# Patient Record
Sex: MALE | Race: WHITE | Employment: OTHER | URBAN - METROPOLITAN AREA
[De-identification: names, ages, dates, MRNs, and addresses within clinical notes are randomized per-mention and may not be internally consistent; named-entity substitution may affect disease eponyms.]

---

## 2018-06-25 PROBLEM — I20.0 UNSTABLE ANGINA (HCC): Status: ACTIVE | Noted: 2018-06-25

## 2018-06-25 PROBLEM — R07.89 ATYPICAL CHEST PAIN: Status: ACTIVE | Noted: 2018-06-25

## 2018-06-25 PROBLEM — E78.5 HLD (HYPERLIPIDEMIA): Status: ACTIVE | Noted: 2018-06-25

## 2018-06-25 PROBLEM — I10 HTN (HYPERTENSION): Status: ACTIVE | Noted: 2018-06-25

## 2018-06-25 PROBLEM — R07.9 CHEST PAIN: Status: ACTIVE | Noted: 2018-06-25

## 2018-12-18 ENCOUNTER — HOSPITAL ENCOUNTER (EMERGENCY)
Age: 58
Discharge: ANOTHER HOSPITAL | End: 2018-12-18
Attending: EMERGENCY MEDICINE
Payer: MEDICARE

## 2018-12-18 VITALS
HEART RATE: 100 BPM | WEIGHT: 265 LBS | BODY MASS INDEX: 34.01 KG/M2 | RESPIRATION RATE: 14 BRPM | DIASTOLIC BLOOD PRESSURE: 108 MMHG | TEMPERATURE: 97.8 F | SYSTOLIC BLOOD PRESSURE: 160 MMHG | HEIGHT: 74 IN | OXYGEN SATURATION: 98 %

## 2018-12-18 DIAGNOSIS — M79.604 RIGHT LEG PAIN: Primary | ICD-10-CM

## 2018-12-18 PROCEDURE — 99284 EMERGENCY DEPT VISIT MOD MDM: CPT

## 2018-12-18 RX ORDER — ACETAMINOPHEN 325 MG/1
650 TABLET ORAL EVERY 6 HOURS PRN
Status: ON HOLD | COMMUNITY
End: 2022-04-08 | Stop reason: HOSPADM

## 2018-12-18 RX ORDER — OXYCODONE HYDROCHLORIDE 5 MG/1
5 TABLET ORAL EVERY 6 HOURS PRN
COMMUNITY
End: 2022-04-02

## 2018-12-18 ASSESSMENT — PAIN DESCRIPTION - LOCATION: LOCATION: LEG

## 2018-12-18 ASSESSMENT — PAIN DESCRIPTION - ORIENTATION: ORIENTATION: RIGHT;LOWER

## 2018-12-18 ASSESSMENT — PAIN SCALES - GENERAL: PAINLEVEL_OUTOF10: 10
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
## 2022-04-02 ENCOUNTER — HOSPITAL ENCOUNTER (INPATIENT)
Age: 62
LOS: 6 days | Discharge: HOME | DRG: 286 | End: 2022-04-08
Attending: EMERGENCY MEDICINE | Admitting: INTERNAL MEDICINE
Payer: MEDICARE

## 2022-04-02 ENCOUNTER — APPOINTMENT (OUTPATIENT)
Dept: CT IMAGING | Age: 62
DRG: 286 | End: 2022-04-02
Payer: MEDICARE

## 2022-04-02 ENCOUNTER — APPOINTMENT (OUTPATIENT)
Dept: GENERAL RADIOLOGY | Age: 62
DRG: 286 | End: 2022-04-02
Payer: MEDICARE

## 2022-04-02 DIAGNOSIS — J96.01 ACUTE RESPIRATORY FAILURE WITH HYPOXIA (HCC): Primary | ICD-10-CM

## 2022-04-02 DIAGNOSIS — R07.9 CHEST PAIN, UNSPECIFIED TYPE: ICD-10-CM

## 2022-04-02 DIAGNOSIS — I10 HYPERTENSION, UNSPECIFIED TYPE: ICD-10-CM

## 2022-04-02 DIAGNOSIS — I50.9 NEW ONSET OF CONGESTIVE HEART FAILURE (HCC): ICD-10-CM

## 2022-04-02 LAB
A/G RATIO: 1.3 (ref 1.1–2.2)
ALBUMIN SERPL-MCNC: 4.1 G/DL (ref 3.4–5)
ALP BLD-CCNC: 99 U/L (ref 40–129)
ALT SERPL-CCNC: 27 U/L (ref 10–40)
ANION GAP SERPL CALCULATED.3IONS-SCNC: 10 MMOL/L (ref 3–16)
AST SERPL-CCNC: 25 U/L (ref 15–37)
BASE EXCESS VENOUS: -1.6 MMOL/L (ref -3–3)
BASOPHILS ABSOLUTE: 0.1 K/UL (ref 0–0.2)
BASOPHILS RELATIVE PERCENT: 0.8 %
BILIRUB SERPL-MCNC: 0.4 MG/DL (ref 0–1)
BILIRUBIN URINE: NEGATIVE
BLOOD, URINE: NEGATIVE
BUN BLDV-MCNC: 27 MG/DL (ref 7–20)
CALCIUM SERPL-MCNC: 9.1 MG/DL (ref 8.3–10.6)
CHLORIDE BLD-SCNC: 105 MMOL/L (ref 99–110)
CLARITY: CLEAR
CO2: 22 MMOL/L (ref 21–32)
COLOR: YELLOW
CREAT SERPL-MCNC: 0.8 MG/DL (ref 0.8–1.3)
D DIMER: 381 NG/ML DDU (ref 0–229)
EOSINOPHILS ABSOLUTE: 0.2 K/UL (ref 0–0.6)
EOSINOPHILS RELATIVE PERCENT: 2.1 %
GFR AFRICAN AMERICAN: >60
GFR NON-AFRICAN AMERICAN: >60
GLUCOSE BLD-MCNC: 144 MG/DL (ref 70–99)
GLUCOSE URINE: NEGATIVE MG/DL
HCO3 VENOUS: 23.1 MMOL/L (ref 23–29)
HCT VFR BLD CALC: 38 % (ref 40.5–52.5)
HEMOGLOBIN: 12.6 G/DL (ref 13.5–17.5)
HYALINE CASTS: NORMAL /LPF (ref 0–2)
KETONES, URINE: NEGATIVE MG/DL
LEUKOCYTE ESTERASE, URINE: NEGATIVE
LYMPHOCYTES ABSOLUTE: 1.3 K/UL (ref 1–5.1)
LYMPHOCYTES RELATIVE PERCENT: 12.4 %
MCH RBC QN AUTO: 28.8 PG (ref 26–34)
MCHC RBC AUTO-ENTMCNC: 33.2 G/DL (ref 31–36)
MCV RBC AUTO: 86.7 FL (ref 80–100)
MICROSCOPIC EXAMINATION: NORMAL
MONOCYTES ABSOLUTE: 0.8 K/UL (ref 0–1.3)
MONOCYTES RELATIVE PERCENT: 8.1 %
NEUTROPHILS ABSOLUTE: 8 K/UL (ref 1.7–7.7)
NEUTROPHILS RELATIVE PERCENT: 76.6 %
NITRITE, URINE: NEGATIVE
O2 SAT, VEN: 92 %
O2 THERAPY: ABNORMAL
PCO2, VEN: 37.2 MMHG (ref 40–50)
PDW BLD-RTO: 14.5 % (ref 12.4–15.4)
PH UA: 6 (ref 5–8)
PH VENOUS: 7.4 (ref 7.35–7.45)
PLATELET # BLD: 244 K/UL (ref 135–450)
PMV BLD AUTO: 8.8 FL (ref 5–10.5)
PO2, VEN: 63.1 MMHG (ref 25–40)
POTASSIUM REFLEX MAGNESIUM: 4.2 MMOL/L (ref 3.5–5.1)
PRO-BNP: 184 PG/ML (ref 0–124)
PROTEIN UA: NEGATIVE MG/DL
RBC # BLD: 4.38 M/UL (ref 4.2–5.9)
RBC UA: NORMAL /HPF (ref 0–4)
SODIUM BLD-SCNC: 137 MMOL/L (ref 136–145)
SPECIFIC GRAVITY UA: 1.01 (ref 1–1.03)
TCO2 CALC VENOUS: 24 MMOL/L
TOTAL CK: 157 U/L (ref 39–308)
TOTAL PROTEIN: 7.2 G/DL (ref 6.4–8.2)
TROPONIN: <0.01 NG/ML
TSH REFLEX: 0.7 UIU/ML (ref 0.27–4.2)
URINE TYPE: NORMAL
UROBILINOGEN, URINE: 0.2 E.U./DL
WBC # BLD: 10.4 K/UL (ref 4–11)
WBC UA: NORMAL /HPF (ref 0–5)

## 2022-04-02 PROCEDURE — 6370000000 HC RX 637 (ALT 250 FOR IP): Performed by: INTERNAL MEDICINE

## 2022-04-02 PROCEDURE — 36415 COLL VENOUS BLD VENIPUNCTURE: CPT

## 2022-04-02 PROCEDURE — 85025 COMPLETE CBC W/AUTO DIFF WBC: CPT

## 2022-04-02 PROCEDURE — 6370000000 HC RX 637 (ALT 250 FOR IP): Performed by: EMERGENCY MEDICINE

## 2022-04-02 PROCEDURE — 82803 BLOOD GASES ANY COMBINATION: CPT

## 2022-04-02 PROCEDURE — 2580000003 HC RX 258: Performed by: INTERNAL MEDICINE

## 2022-04-02 PROCEDURE — 85379 FIBRIN DEGRADATION QUANT: CPT

## 2022-04-02 PROCEDURE — 6360000004 HC RX CONTRAST MEDICATION: Performed by: INTERNAL MEDICINE

## 2022-04-02 PROCEDURE — 80053 COMPREHEN METABOLIC PANEL: CPT

## 2022-04-02 PROCEDURE — 1200000000 HC SEMI PRIVATE

## 2022-04-02 PROCEDURE — 84443 ASSAY THYROID STIM HORMONE: CPT

## 2022-04-02 PROCEDURE — 93005 ELECTROCARDIOGRAM TRACING: CPT | Performed by: EMERGENCY MEDICINE

## 2022-04-02 PROCEDURE — 83880 ASSAY OF NATRIURETIC PEPTIDE: CPT

## 2022-04-02 PROCEDURE — 81001 URINALYSIS AUTO W/SCOPE: CPT

## 2022-04-02 PROCEDURE — 71275 CT ANGIOGRAPHY CHEST: CPT

## 2022-04-02 PROCEDURE — 82550 ASSAY OF CK (CPK): CPT

## 2022-04-02 PROCEDURE — 71046 X-RAY EXAM CHEST 2 VIEWS: CPT

## 2022-04-02 PROCEDURE — 84484 ASSAY OF TROPONIN QUANT: CPT

## 2022-04-02 PROCEDURE — 83036 HEMOGLOBIN GLYCOSYLATED A1C: CPT

## 2022-04-02 PROCEDURE — 99284 EMERGENCY DEPT VISIT MOD MDM: CPT

## 2022-04-02 PROCEDURE — 6360000002 HC RX W HCPCS: Performed by: EMERGENCY MEDICINE

## 2022-04-02 PROCEDURE — 6360000002 HC RX W HCPCS: Performed by: INTERNAL MEDICINE

## 2022-04-02 RX ORDER — ACETAMINOPHEN 650 MG/1
650 SUPPOSITORY RECTAL EVERY 6 HOURS PRN
Status: DISCONTINUED | OUTPATIENT
Start: 2022-04-02 | End: 2022-04-08 | Stop reason: HOSPADM

## 2022-04-02 RX ORDER — ONDANSETRON 2 MG/ML
4 INJECTION INTRAMUSCULAR; INTRAVENOUS EVERY 6 HOURS PRN
Status: DISCONTINUED | OUTPATIENT
Start: 2022-04-02 | End: 2022-04-08 | Stop reason: HOSPADM

## 2022-04-02 RX ORDER — ONDANSETRON 4 MG/1
4 TABLET, ORALLY DISINTEGRATING ORAL EVERY 8 HOURS PRN
Status: DISCONTINUED | OUTPATIENT
Start: 2022-04-02 | End: 2022-04-08 | Stop reason: HOSPADM

## 2022-04-02 RX ORDER — SODIUM CHLORIDE 9 MG/ML
INJECTION, SOLUTION INTRAVENOUS PRN
Status: DISCONTINUED | OUTPATIENT
Start: 2022-04-02 | End: 2022-04-08 | Stop reason: HOSPADM

## 2022-04-02 RX ORDER — SODIUM CHLORIDE 0.9 % (FLUSH) 0.9 %
5-40 SYRINGE (ML) INJECTION PRN
Status: DISCONTINUED | OUTPATIENT
Start: 2022-04-02 | End: 2022-04-08 | Stop reason: HOSPADM

## 2022-04-02 RX ORDER — FUROSEMIDE 10 MG/ML
40 INJECTION INTRAMUSCULAR; INTRAVENOUS 2 TIMES DAILY
Status: DISCONTINUED | OUTPATIENT
Start: 2022-04-02 | End: 2022-04-02

## 2022-04-02 RX ORDER — FUROSEMIDE 10 MG/ML
20 INJECTION INTRAMUSCULAR; INTRAVENOUS ONCE
Status: COMPLETED | OUTPATIENT
Start: 2022-04-02 | End: 2022-04-02

## 2022-04-02 RX ORDER — FUROSEMIDE 10 MG/ML
40 INJECTION INTRAMUSCULAR; INTRAVENOUS 2 TIMES DAILY
Status: DISCONTINUED | OUTPATIENT
Start: 2022-04-03 | End: 2022-04-03

## 2022-04-02 RX ORDER — HYDRALAZINE HYDROCHLORIDE 25 MG/1
25 TABLET, FILM COATED ORAL ONCE
Status: COMPLETED | OUTPATIENT
Start: 2022-04-02 | End: 2022-04-02

## 2022-04-02 RX ORDER — ACETAMINOPHEN 325 MG/1
650 TABLET ORAL EVERY 6 HOURS PRN
Status: DISCONTINUED | OUTPATIENT
Start: 2022-04-02 | End: 2022-04-08 | Stop reason: HOSPADM

## 2022-04-02 RX ORDER — SODIUM CHLORIDE 0.9 % (FLUSH) 0.9 %
5-40 SYRINGE (ML) INJECTION EVERY 12 HOURS SCHEDULED
Status: DISCONTINUED | OUTPATIENT
Start: 2022-04-02 | End: 2022-04-08 | Stop reason: HOSPADM

## 2022-04-02 RX ORDER — ATORVASTATIN CALCIUM 40 MG/1
40 TABLET, FILM COATED ORAL NIGHTLY
Status: DISCONTINUED | OUTPATIENT
Start: 2022-04-02 | End: 2022-04-08 | Stop reason: HOSPADM

## 2022-04-02 RX ORDER — POLYETHYLENE GLYCOL 3350 17 G/17G
17 POWDER, FOR SOLUTION ORAL DAILY PRN
Status: DISCONTINUED | OUTPATIENT
Start: 2022-04-02 | End: 2022-04-08 | Stop reason: HOSPADM

## 2022-04-02 RX ORDER — ASPIRIN 81 MG/1
324 TABLET, CHEWABLE ORAL ONCE
Status: COMPLETED | OUTPATIENT
Start: 2022-04-02 | End: 2022-04-02

## 2022-04-02 RX ORDER — PANTOPRAZOLE SODIUM 40 MG/1
40 TABLET, DELAYED RELEASE ORAL
COMMUNITY
Start: 2021-08-13

## 2022-04-02 RX ORDER — GABAPENTIN 300 MG/1
300 CAPSULE ORAL 3 TIMES DAILY
Status: ON HOLD | COMMUNITY
Start: 2021-12-06 | End: 2022-04-08 | Stop reason: SDUPTHER

## 2022-04-02 RX ORDER — ASPIRIN 81 MG/1
81 TABLET, CHEWABLE ORAL DAILY
Status: DISCONTINUED | OUTPATIENT
Start: 2022-04-03 | End: 2022-04-08 | Stop reason: HOSPADM

## 2022-04-02 RX ADMIN — SODIUM CHLORIDE, PRESERVATIVE FREE 10 ML: 5 INJECTION INTRAVENOUS at 20:22

## 2022-04-02 RX ADMIN — HYDRALAZINE HYDROCHLORIDE 25 MG: 25 TABLET, FILM COATED ORAL at 07:27

## 2022-04-02 RX ADMIN — NITROGLYCERIN 0.5 INCH: 20 OINTMENT TOPICAL at 06:49

## 2022-04-02 RX ADMIN — ASPIRIN 81 MG 324 MG: 81 TABLET ORAL at 05:38

## 2022-04-02 RX ADMIN — FUROSEMIDE 20 MG: 10 INJECTION, SOLUTION INTRAMUSCULAR; INTRAVENOUS at 06:49

## 2022-04-02 RX ADMIN — ATORVASTATIN CALCIUM 40 MG: 40 TABLET, FILM COATED ORAL at 20:22

## 2022-04-02 RX ADMIN — ENOXAPARIN SODIUM 40 MG: 40 INJECTION SUBCUTANEOUS at 13:17

## 2022-04-02 RX ADMIN — IOPAMIDOL 80 ML: 755 INJECTION, SOLUTION INTRAVENOUS at 19:20

## 2022-04-02 ASSESSMENT — PAIN SCALES - GENERAL
PAINLEVEL_OUTOF10: 0
PAINLEVEL_OUTOF10: 5
PAINLEVEL_OUTOF10: 0

## 2022-04-02 ASSESSMENT — ENCOUNTER SYMPTOMS
NAUSEA: 0
BACK PAIN: 1
COUGH: 0
ABDOMINAL PAIN: 0
COLOR CHANGE: 0
VOMITING: 0
SHORTNESS OF BREATH: 1

## 2022-04-02 ASSESSMENT — PAIN DESCRIPTION - LOCATION: LOCATION: CHEST
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
## 2022-04-02 NOTE — PROGRESS NOTES
4 Eyes Admission Assessment     I agree as the admission nurse that 2 RN's have performed a thorough Head to Toe Skin Assessment on the patient. ALL assessment sites listed below have been assessed on admission. Areas assessed by both nurses:   [x]   Head, Face, and Ears   [x]   Shoulders, Back, and Chest  [x]   Arms, Elbows, and Hands   [x]   Coccyx, Sacrum, and Ischum  [x]   Legs, Feet, and Heels        Does the Patient have Skin Breakdown?   No         Phi Prevention initiated:  No   Wound Care Orders initiated:  No      WOC nurse consulted for Pressure Injury (Stage 3,4, Unstageable, DTI, NWPT, and Complex wounds):  NA      Nurse 1 eSignature: Electronically signed by Narayan Seth RN on 4/2/22 at 3:18 PM EDT    **SHARE this note so that the co-signing nurse is able to place an eSignature**    Nurse 2 eSignature: Electronically signed by Isabelle Moreno RN on 4/2/22 at 4:46 PM EDT    \
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
## 2022-04-03 LAB
ANION GAP SERPL CALCULATED.3IONS-SCNC: 10 MMOL/L (ref 3–16)
ANTI-XA UNFRAC HEPARIN: 0.63 IU/ML (ref 0.3–0.7)
APTT: 33.7 SEC (ref 26.2–38.6)
BASOPHILS ABSOLUTE: 0 K/UL (ref 0–0.2)
BASOPHILS RELATIVE PERCENT: 0.4 %
BUN BLDV-MCNC: 17 MG/DL (ref 7–20)
CALCIUM SERPL-MCNC: 9.4 MG/DL (ref 8.3–10.6)
CHLORIDE BLD-SCNC: 101 MMOL/L (ref 99–110)
CO2: 27 MMOL/L (ref 21–32)
CREAT SERPL-MCNC: 0.9 MG/DL (ref 0.8–1.3)
EOSINOPHILS ABSOLUTE: 0.3 K/UL (ref 0–0.6)
EOSINOPHILS RELATIVE PERCENT: 3.7 %
ESTIMATED AVERAGE GLUCOSE: 128.4 MG/DL
GFR AFRICAN AMERICAN: >60
GFR NON-AFRICAN AMERICAN: >60
GLUCOSE BLD-MCNC: 113 MG/DL (ref 70–99)
HBA1C MFR BLD: 6.1 %
HCT VFR BLD CALC: 39.1 % (ref 40.5–52.5)
HEMOGLOBIN: 12.9 G/DL (ref 13.5–17.5)
INR BLD: 1 (ref 0.88–1.12)
LYMPHOCYTES ABSOLUTE: 1.5 K/UL (ref 1–5.1)
LYMPHOCYTES RELATIVE PERCENT: 19.7 %
MAGNESIUM: 2.3 MG/DL (ref 1.8–2.4)
MCH RBC QN AUTO: 28.9 PG (ref 26–34)
MCHC RBC AUTO-ENTMCNC: 32.9 G/DL (ref 31–36)
MCV RBC AUTO: 87.7 FL (ref 80–100)
MONOCYTES ABSOLUTE: 1 K/UL (ref 0–1.3)
MONOCYTES RELATIVE PERCENT: 13 %
NEUTROPHILS ABSOLUTE: 4.9 K/UL (ref 1.7–7.7)
NEUTROPHILS RELATIVE PERCENT: 63.2 %
PDW BLD-RTO: 14.8 % (ref 12.4–15.4)
PLATELET # BLD: 255 K/UL (ref 135–450)
PMV BLD AUTO: 8.9 FL (ref 5–10.5)
POTASSIUM SERPL-SCNC: 4.1 MMOL/L (ref 3.5–5.1)
PROTHROMBIN TIME: 11.3 SEC (ref 9.9–12.7)
RBC # BLD: 4.46 M/UL (ref 4.2–5.9)
SODIUM BLD-SCNC: 138 MMOL/L (ref 136–145)
WBC # BLD: 7.7 K/UL (ref 4–11)

## 2022-04-03 PROCEDURE — 2580000003 HC RX 258: Performed by: INTERNAL MEDICINE

## 2022-04-03 PROCEDURE — 1200000000 HC SEMI PRIVATE

## 2022-04-03 PROCEDURE — 6360000002 HC RX W HCPCS: Performed by: INTERNAL MEDICINE

## 2022-04-03 PROCEDURE — 36415 COLL VENOUS BLD VENIPUNCTURE: CPT

## 2022-04-03 PROCEDURE — 93005 ELECTROCARDIOGRAM TRACING: CPT | Performed by: INTERNAL MEDICINE

## 2022-04-03 PROCEDURE — 2500000003 HC RX 250 WO HCPCS: Performed by: INTERNAL MEDICINE

## 2022-04-03 PROCEDURE — 6370000000 HC RX 637 (ALT 250 FOR IP): Performed by: INTERNAL MEDICINE

## 2022-04-03 PROCEDURE — 85610 PROTHROMBIN TIME: CPT

## 2022-04-03 PROCEDURE — 83735 ASSAY OF MAGNESIUM: CPT

## 2022-04-03 PROCEDURE — 85730 THROMBOPLASTIN TIME PARTIAL: CPT

## 2022-04-03 PROCEDURE — 99222 1ST HOSP IP/OBS MODERATE 55: CPT | Performed by: INTERNAL MEDICINE

## 2022-04-03 PROCEDURE — 85025 COMPLETE CBC W/AUTO DIFF WBC: CPT

## 2022-04-03 PROCEDURE — 80061 LIPID PANEL: CPT

## 2022-04-03 PROCEDURE — 85520 HEPARIN ASSAY: CPT

## 2022-04-03 PROCEDURE — 80048 BASIC METABOLIC PNL TOTAL CA: CPT

## 2022-04-03 RX ORDER — HEPARIN SODIUM 1000 [USP'U]/ML
10000 INJECTION, SOLUTION INTRAVENOUS; SUBCUTANEOUS PRN
Status: DISCONTINUED | OUTPATIENT
Start: 2022-04-03 | End: 2022-04-08 | Stop reason: HOSPADM

## 2022-04-03 RX ORDER — HEPARIN SODIUM 1000 [USP'U]/ML
5000 INJECTION, SOLUTION INTRAVENOUS; SUBCUTANEOUS PRN
Status: DISCONTINUED | OUTPATIENT
Start: 2022-04-03 | End: 2022-04-08 | Stop reason: HOSPADM

## 2022-04-03 RX ORDER — METOPROLOL TARTRATE 5 MG/5ML
5 INJECTION INTRAVENOUS EVERY 5 MIN PRN
Status: COMPLETED | OUTPATIENT
Start: 2022-04-03 | End: 2022-04-05

## 2022-04-03 RX ORDER — METOPROLOL TARTRATE 5 MG/5ML
5 INJECTION INTRAVENOUS ONCE
Status: COMPLETED | OUTPATIENT
Start: 2022-04-03 | End: 2022-04-03

## 2022-04-03 RX ORDER — PANTOPRAZOLE SODIUM 40 MG/1
40 TABLET, DELAYED RELEASE ORAL
Status: DISCONTINUED | OUTPATIENT
Start: 2022-04-03 | End: 2022-04-08 | Stop reason: HOSPADM

## 2022-04-03 RX ORDER — HEPARIN SODIUM 10000 [USP'U]/100ML
5-30 INJECTION, SOLUTION INTRAVENOUS CONTINUOUS
Status: DISCONTINUED | OUTPATIENT
Start: 2022-04-03 | End: 2022-04-07

## 2022-04-03 RX ORDER — METOPROLOL TARTRATE 5 MG/5ML
5 INJECTION INTRAVENOUS
Status: COMPLETED | OUTPATIENT
Start: 2022-04-03 | End: 2022-04-03

## 2022-04-03 RX ORDER — HEPARIN SODIUM 1000 [USP'U]/ML
10000 INJECTION, SOLUTION INTRAVENOUS; SUBCUTANEOUS ONCE
Status: COMPLETED | OUTPATIENT
Start: 2022-04-03 | End: 2022-04-03

## 2022-04-03 RX ADMIN — HEPARIN SODIUM 14 UNITS/KG/HR: 5000 INJECTION INTRAVENOUS; SUBCUTANEOUS at 15:08

## 2022-04-03 RX ADMIN — METOPROLOL TARTRATE 5 MG: 5 INJECTION INTRAVENOUS at 09:20

## 2022-04-03 RX ADMIN — ATORVASTATIN CALCIUM 40 MG: 40 TABLET, FILM COATED ORAL at 20:32

## 2022-04-03 RX ADMIN — ENOXAPARIN SODIUM 40 MG: 40 INJECTION SUBCUTANEOUS at 08:37

## 2022-04-03 RX ADMIN — FUROSEMIDE 40 MG: 10 INJECTION, SOLUTION INTRAMUSCULAR; INTRAVENOUS at 08:37

## 2022-04-03 RX ADMIN — PANTOPRAZOLE SODIUM 40 MG: 40 TABLET, DELAYED RELEASE ORAL at 15:03

## 2022-04-03 RX ADMIN — METOPROLOL TARTRATE 25 MG: 25 TABLET, FILM COATED ORAL at 20:32

## 2022-04-03 RX ADMIN — ASPIRIN 81 MG 81 MG: 81 TABLET ORAL at 08:37

## 2022-04-03 RX ADMIN — METOPROLOL TARTRATE 5 MG: 5 INJECTION INTRAVENOUS at 06:06

## 2022-04-03 RX ADMIN — HEPARIN SODIUM 10000 UNITS: 1000 INJECTION INTRAVENOUS; SUBCUTANEOUS at 15:08

## 2022-04-03 RX ADMIN — METOPROLOL TARTRATE 25 MG: 25 TABLET, FILM COATED ORAL at 15:03

## 2022-04-03 RX ADMIN — SODIUM CHLORIDE, PRESERVATIVE FREE 10 ML: 5 INJECTION INTRAVENOUS at 08:37

## 2022-04-03 RX ADMIN — SODIUM CHLORIDE, PRESERVATIVE FREE 10 ML: 5 INJECTION INTRAVENOUS at 20:32

## 2022-04-03 RX ADMIN — METOPROLOL TARTRATE 5 MG: 5 INJECTION INTRAVENOUS at 01:48

## 2022-04-03 NOTE — PLAN OF CARE
Problem: Safety:  Goal: Free from accidental physical injury  Description: Free from accidental physical injury  4/3/2022 1542 by Yaquelin Jones RN  Outcome: Ongoing   Pt maintains steady gait with ambulation. Pt is A&O x4 and uses call light when in need of assistance. Will monitor. Problem: Daily Care:  Goal: Daily care needs are met  Description: Daily care needs are met  Outcome: Ongoing     Problem: Pain:  Goal: Patient's pain/discomfort is manageable  Description: Patient's pain/discomfort is manageable  4/3/2022 1542 by Yaquelin Jones RN  Outcome: Ongoing   Pt has not c/o pain this shift. Will monitor. Problem: Skin Integrity:  Goal: Skin integrity will stabilize  Description: Skin integrity will stabilize  Outcome: Ongoing   Pt does not have any s/s of skin breakdown.     Problem: OXYGENATION/RESPIRATORY FUNCTION  Goal: Patient will maintain patent airway  4/3/2022 1542 by Yaquelin Jones RN  Outcome: Ongoing
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
## 2022-04-04 LAB
ANION GAP SERPL CALCULATED.3IONS-SCNC: 9 MMOL/L (ref 3–16)
ANTI-XA UNFRAC HEPARIN: 0.47 IU/ML (ref 0.3–0.7)
BASOPHILS ABSOLUTE: 0.1 K/UL (ref 0–0.2)
BASOPHILS RELATIVE PERCENT: 1 %
BUN BLDV-MCNC: 23 MG/DL (ref 7–20)
CALCIUM SERPL-MCNC: 9.8 MG/DL (ref 8.3–10.6)
CHLORIDE BLD-SCNC: 100 MMOL/L (ref 99–110)
CHOLESTEROL, TOTAL: 129 MG/DL (ref 0–199)
CO2: 25 MMOL/L (ref 21–32)
CREAT SERPL-MCNC: 1 MG/DL (ref 0.8–1.3)
EKG ATRIAL RATE: 136 BPM
EKG ATRIAL RATE: 85 BPM
EKG DIAGNOSIS: NORMAL
EKG DIAGNOSIS: NORMAL
EKG P AXIS: 63 DEGREES
EKG P-R INTERVAL: 166 MS
EKG Q-T INTERVAL: 334 MS
EKG Q-T INTERVAL: 400 MS
EKG QRS DURATION: 84 MS
EKG QRS DURATION: 98 MS
EKG QTC CALCULATION (BAZETT): 476 MS
EKG QTC CALCULATION (BAZETT): 483 MS
EKG R AXIS: 33 DEGREES
EKG R AXIS: 56 DEGREES
EKG T AXIS: 37 DEGREES
EKG T AXIS: 75 DEGREES
EKG VENTRICULAR RATE: 126 BPM
EKG VENTRICULAR RATE: 85 BPM
EOSINOPHILS ABSOLUTE: 0.4 K/UL (ref 0–0.6)
EOSINOPHILS RELATIVE PERCENT: 3.8 %
GFR AFRICAN AMERICAN: >60
GFR NON-AFRICAN AMERICAN: >60
GLUCOSE BLD-MCNC: 132 MG/DL (ref 70–99)
HCT VFR BLD CALC: 41.5 % (ref 40.5–52.5)
HDLC SERPL-MCNC: 49 MG/DL (ref 40–60)
HEMOGLOBIN: 13.8 G/DL (ref 13.5–17.5)
LDL CHOLESTEROL CALCULATED: 65 MG/DL
LYMPHOCYTES ABSOLUTE: 2.9 K/UL (ref 1–5.1)
LYMPHOCYTES RELATIVE PERCENT: 29.5 %
MAGNESIUM: 2.3 MG/DL (ref 1.8–2.4)
MCH RBC QN AUTO: 29.3 PG (ref 26–34)
MCHC RBC AUTO-ENTMCNC: 33.4 G/DL (ref 31–36)
MCV RBC AUTO: 87.8 FL (ref 80–100)
MONOCYTES ABSOLUTE: 1.1 K/UL (ref 0–1.3)
MONOCYTES RELATIVE PERCENT: 10.7 %
NEUTROPHILS ABSOLUTE: 5.4 K/UL (ref 1.7–7.7)
NEUTROPHILS RELATIVE PERCENT: 55 %
PDW BLD-RTO: 14.8 % (ref 12.4–15.4)
PLATELET # BLD: 265 K/UL (ref 135–450)
PMV BLD AUTO: 8.9 FL (ref 5–10.5)
POTASSIUM SERPL-SCNC: 3.6 MMOL/L (ref 3.5–5.1)
RBC # BLD: 4.72 M/UL (ref 4.2–5.9)
SARS-COV-2, NAAT: NOT DETECTED
SODIUM BLD-SCNC: 134 MMOL/L (ref 136–145)
TRIGL SERPL-MCNC: 75 MG/DL (ref 0–150)
VLDLC SERPL CALC-MCNC: 15 MG/DL
WBC # BLD: 9.9 K/UL (ref 4–11)

## 2022-04-04 PROCEDURE — 6370000000 HC RX 637 (ALT 250 FOR IP): Performed by: INTERNAL MEDICINE

## 2022-04-04 PROCEDURE — 80048 BASIC METABOLIC PNL TOTAL CA: CPT

## 2022-04-04 PROCEDURE — 6360000002 HC RX W HCPCS: Performed by: INTERNAL MEDICINE

## 2022-04-04 PROCEDURE — 36415 COLL VENOUS BLD VENIPUNCTURE: CPT

## 2022-04-04 PROCEDURE — 2060000000 HC ICU INTERMEDIATE R&B

## 2022-04-04 PROCEDURE — 85520 HEPARIN ASSAY: CPT

## 2022-04-04 PROCEDURE — 87635 SARS-COV-2 COVID-19 AMP PRB: CPT

## 2022-04-04 PROCEDURE — 93010 ELECTROCARDIOGRAM REPORT: CPT | Performed by: INTERNAL MEDICINE

## 2022-04-04 PROCEDURE — 83735 ASSAY OF MAGNESIUM: CPT

## 2022-04-04 PROCEDURE — 2580000003 HC RX 258: Performed by: INTERNAL MEDICINE

## 2022-04-04 PROCEDURE — 85025 COMPLETE CBC W/AUTO DIFF WBC: CPT

## 2022-04-04 RX ADMIN — HEPARIN SODIUM 14 UNITS/KG/HR: 5000 INJECTION INTRAVENOUS; SUBCUTANEOUS at 05:10

## 2022-04-04 RX ADMIN — SODIUM CHLORIDE, PRESERVATIVE FREE 10 ML: 5 INJECTION INTRAVENOUS at 09:07

## 2022-04-04 RX ADMIN — PANTOPRAZOLE SODIUM 40 MG: 40 TABLET, DELAYED RELEASE ORAL at 05:11

## 2022-04-04 RX ADMIN — METOPROLOL TARTRATE 25 MG: 25 TABLET, FILM COATED ORAL at 09:07

## 2022-04-04 RX ADMIN — SODIUM CHLORIDE, PRESERVATIVE FREE 10 ML: 5 INJECTION INTRAVENOUS at 21:25

## 2022-04-04 RX ADMIN — METOPROLOL TARTRATE 25 MG: 25 TABLET, FILM COATED ORAL at 21:24

## 2022-04-04 RX ADMIN — HEPARIN SODIUM 14 UNITS/KG/HR: 5000 INJECTION INTRAVENOUS; SUBCUTANEOUS at 18:35

## 2022-04-04 RX ADMIN — ATORVASTATIN CALCIUM 40 MG: 40 TABLET, FILM COATED ORAL at 21:24

## 2022-04-04 RX ADMIN — ASPIRIN 81 MG 81 MG: 81 TABLET ORAL at 09:07

## 2022-04-04 ASSESSMENT — PAIN SCALES - GENERAL
PAINLEVEL_OUTOF10: 0

## 2022-04-04 NOTE — CONSULTS
Nutrition Education    RD consulted for CHF diet edu. Pt's wife and daughter in room during encounter participating in discussion. RD discussed importance of eating a low sodium diet for reducing CHF complications. RD educated pt and family regarding how to identify a low sodium food product via nutrition label, sodium dietary guidelines for CHF and ways to reduce sodium intake. Pt and family emphasized their readiness to make dietary changes to improve his health. · Verbally reviewed information with Patient and Family  · Educated on Heart Failure Nutrition Therapy  · Written educational materials provided. · Contact name and number provided. · Refer to Patient Education activity for more details.     Electronically signed by Surya Delgado MS, RD, LD on 4/4/22 at 10:21 AM EDT    Contact: [phone]

## 2022-04-04 NOTE — PLAN OF CARE
Problem: Safety:  Goal: Free from accidental physical injury  Description: Free from accidental physical injury  4/4/2022 0139 by Antonio Olivares, RN  Outcome: Ongoing  Pt calls appropriately for needs, call light and bedside table within reach. Ambulates with steady gait, will continue to monitor. Problem: Pain:  Goal: Patient's pain/discomfort is manageable  Description: Patient's pain/discomfort is manageable  4/4/2022 0139 by Antonio Olivares, RN  Outcome: Ongoing  Pt denies c/o pain. Problem: HEMODYNAMIC STATUS  Goal: Patient has stable vital signs and fluid balance  4/4/2022 0139 by Antonio Olivares, RN  Outcome: Ongoing  Pt is a-fib/a-flutter on telemetry. HR in low 100's. Heparin gtt infusing per cardiology, planning for ECHO in am. Will continue to monitor.
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
## 2022-04-04 NOTE — PLAN OF CARE
Problem: Safety:  Goal: Free from accidental physical injury  Description: Free from accidental physical injury  4/4/2022 0927 by Crystal Jimenez RN  Outcome: Ongoing  Note: Pt is up ad gerardo to the bathroom. Able to call nursing staff if assistance is needed. Non-skid socks worn while out of bed. Wife at bedside. Problem: Pain:  Goal: Patient's pain/discomfort is manageable  Description: Patient's pain/discomfort is manageable  4/4/2022 0927 by Crystal Jimenez RN  Outcome: Ongoing  Note: Patient has denied chest pain and shortness of breath so far this shift. Pt is able to use call light to express needs or concerns. Family at bedside and hourly rounding in place. Problem: OXYGENATION/RESPIRATORY FUNCTION  Goal: Patient will maintain patent airway  Outcome: Ongoing  Note: Patient remains on room air. Lungs are clear      Problem: HEMODYNAMIC STATUS  Goal: Patient has stable vital signs and fluid balance  4/4/2022 0927 by Crystal Jimenez RN  Outcome: Ongoing  Note: Patient is on strict I&O's continues to void via urinal.VS as charted in the [facility]

## 2022-04-05 LAB
ANION GAP SERPL CALCULATED.3IONS-SCNC: 12 MMOL/L (ref 3–16)
ANTI-XA UNFRAC HEPARIN: 0.46 IU/ML (ref 0.3–0.7)
BASOPHILS ABSOLUTE: 0 K/UL (ref 0–0.2)
BASOPHILS RELATIVE PERCENT: 0.4 %
BUN BLDV-MCNC: 21 MG/DL (ref 7–20)
CALCIUM SERPL-MCNC: 9.4 MG/DL (ref 8.3–10.6)
CHLORIDE BLD-SCNC: 103 MMOL/L (ref 99–110)
CO2: 23 MMOL/L (ref 21–32)
CREAT SERPL-MCNC: 1.1 MG/DL (ref 0.8–1.3)
EOSINOPHILS ABSOLUTE: 0.3 K/UL (ref 0–0.6)
EOSINOPHILS RELATIVE PERCENT: 3.4 %
GFR AFRICAN AMERICAN: >60
GFR NON-AFRICAN AMERICAN: >60
GLUCOSE BLD-MCNC: 113 MG/DL (ref 70–99)
HCT VFR BLD CALC: 38.7 % (ref 40.5–52.5)
HEMOGLOBIN: 12.9 G/DL (ref 13.5–17.5)
LV EF: 43 %
LVEF MODALITY: NORMAL
LYMPHOCYTES ABSOLUTE: 2.7 K/UL (ref 1–5.1)
LYMPHOCYTES RELATIVE PERCENT: 26.8 %
MAGNESIUM: 2.1 MG/DL (ref 1.8–2.4)
MCH RBC QN AUTO: 29.1 PG (ref 26–34)
MCHC RBC AUTO-ENTMCNC: 33.3 G/DL (ref 31–36)
MCV RBC AUTO: 87.3 FL (ref 80–100)
MONOCYTES ABSOLUTE: 0.9 K/UL (ref 0–1.3)
MONOCYTES RELATIVE PERCENT: 9.3 %
NEUTROPHILS ABSOLUTE: 6.1 K/UL (ref 1.7–7.7)
NEUTROPHILS RELATIVE PERCENT: 60.1 %
PDW BLD-RTO: 14.6 % (ref 12.4–15.4)
PLATELET # BLD: 282 K/UL (ref 135–450)
PMV BLD AUTO: 8.7 FL (ref 5–10.5)
POTASSIUM SERPL-SCNC: 4.1 MMOL/L (ref 3.5–5.1)
PRO-BNP: 91 PG/ML (ref 0–124)
RBC # BLD: 4.44 M/UL (ref 4.2–5.9)
SODIUM BLD-SCNC: 138 MMOL/L (ref 136–145)
WBC # BLD: 10.1 K/UL (ref 4–11)

## 2022-04-05 PROCEDURE — 6360000004 HC RX CONTRAST MEDICATION: Performed by: INTERNAL MEDICINE

## 2022-04-05 PROCEDURE — 36415 COLL VENOUS BLD VENIPUNCTURE: CPT

## 2022-04-05 PROCEDURE — 85520 HEPARIN ASSAY: CPT

## 2022-04-05 PROCEDURE — 2060000000 HC ICU INTERMEDIATE R&B

## 2022-04-05 PROCEDURE — 2580000003 HC RX 258: Performed by: INTERNAL MEDICINE

## 2022-04-05 PROCEDURE — 83735 ASSAY OF MAGNESIUM: CPT

## 2022-04-05 PROCEDURE — 80048 BASIC METABOLIC PNL TOTAL CA: CPT

## 2022-04-05 PROCEDURE — 2500000003 HC RX 250 WO HCPCS: Performed by: INTERNAL MEDICINE

## 2022-04-05 PROCEDURE — 83880 ASSAY OF NATRIURETIC PEPTIDE: CPT

## 2022-04-05 PROCEDURE — 6360000002 HC RX W HCPCS: Performed by: INTERNAL MEDICINE

## 2022-04-05 PROCEDURE — 99233 SBSQ HOSP IP/OBS HIGH 50: CPT | Performed by: NURSE PRACTITIONER

## 2022-04-05 PROCEDURE — 85025 COMPLETE CBC W/AUTO DIFF WBC: CPT

## 2022-04-05 PROCEDURE — 6370000000 HC RX 637 (ALT 250 FOR IP): Performed by: INTERNAL MEDICINE

## 2022-04-05 PROCEDURE — C8929 TTE W OR WO FOL WCON,DOPPLER: HCPCS

## 2022-04-05 RX ADMIN — METOPROLOL TARTRATE 25 MG: 25 TABLET, FILM COATED ORAL at 20:08

## 2022-04-05 RX ADMIN — ASPIRIN 81 MG 81 MG: 81 TABLET ORAL at 10:08

## 2022-04-05 RX ADMIN — HEPARIN SODIUM 13.99 UNITS/KG/HR: 5000 INJECTION INTRAVENOUS; SUBCUTANEOUS at 08:45

## 2022-04-05 RX ADMIN — METOPROLOL TARTRATE 5 MG: 5 INJECTION INTRAVENOUS at 16:10

## 2022-04-05 RX ADMIN — ATORVASTATIN CALCIUM 40 MG: 40 TABLET, FILM COATED ORAL at 20:08

## 2022-04-05 RX ADMIN — METOPROLOL TARTRATE 5 MG: 5 INJECTION INTRAVENOUS at 19:14

## 2022-04-05 RX ADMIN — PERFLUTREN 1.65 MG: 6.52 INJECTION, SUSPENSION INTRAVENOUS at 08:20

## 2022-04-05 RX ADMIN — METOPROLOL TARTRATE 25 MG: 25 TABLET, FILM COATED ORAL at 10:08

## 2022-04-05 RX ADMIN — PANTOPRAZOLE SODIUM 40 MG: 40 TABLET, DELAYED RELEASE ORAL at 06:24

## 2022-04-05 RX ADMIN — SODIUM CHLORIDE, PRESERVATIVE FREE 10 ML: 5 INJECTION INTRAVENOUS at 10:09

## 2022-04-05 RX ADMIN — HEPARIN SODIUM 14 UNITS/KG/HR: 5000 INJECTION INTRAVENOUS; SUBCUTANEOUS at 22:28

## 2022-04-05 RX ADMIN — SODIUM CHLORIDE, PRESERVATIVE FREE 10 ML: 5 INJECTION INTRAVENOUS at 20:08

## 2022-04-05 ASSESSMENT — PAIN SCALES - GENERAL
PAINLEVEL_OUTOF10: 0

## 2022-04-05 NOTE — PROGRESS NOTES
4 Eyes Admission Assessment     I agree as the admission nurse that 2 RN's have performed a thorough Head to Toe Skin Assessment on the patient. ALL assessment sites listed below have been assessed on admission. Areas assessed by both nurses: Ralph Jewell  [x]   Head, Face, and Ears   [x]   Shoulders, Back, and Chest  [x]   Arms, Elbows, and Hands   [x]   Coccyx, Sacrum, and Ischium  [x]   Legs, Feet, and Heels - surgical scars on both knees from knee replacements        Does the Patient have Skin Breakdown?   No         Phi Prevention initiated:  No   Wound Care Orders initiated:  No      C nurse consulted for Pressure Injury (Stage 3,4, Unstageable, DTI, NWPT, and Complex wounds) or Phi score 18 or lower:  No      Nurse 1 eSignature: Electronically signed by Eli Lopez RN on 4/4/22 at 11:54 PM EDT    **SHARE this note so that the co-signing nurse is able to place an eSignature**    Nurse 2 eSignature: Electronically signed by Cam Restrepo RN on 4/5/22 at 12:33 AM EDT

## 2022-04-05 NOTE — PROGRESS NOTES
CC:  HPI:     S: No chest pain, palpitations or LH/dizziness. Breathing ok. Edema improving. No GI/ bleeding. Tele: Sinus , PAC    O:  Physical Exam:  BP (!) 130/104   Pulse 72   Temp 97.7 °F (36.5 °C) (Oral)   Resp 18   Ht 6' 2\" (1.88 m)   Wt (!) 318 lb 9 oz (144.5 kg)   SpO2 96%   BMI 40.90 kg/m²    General (appearance):  No acute distress  Eyes: anicteric   Neck: soft, No JVD  Ears/Nose/Mouth/Thorat: No cyanosis  CV: RRR   Respiratory:  Clear, normal effort   GI: soft, non-tender, non-distended  Skin: Warm, dry. No rashes  Neuro/Psych: Alert and oriented x 3. Appropriate behavior  Ext:  No c/c. Mild BLE edema  Pulses:  2+ radial     I.O's= -4.8 L     Weight  Admission: Weight: (!) 321 lb 1.6 oz (145.7 kg)   Today: Weight: (!) 318 lb 9 oz (144.5 kg)    CBC:   Recent Labs     22  0745 22  0348 22  0423   WBC 7.7 9.9 10.1   HGB 12.9* 13.8 12.9*   HCT 39.1* 41.5 38.7*   MCV 87.7 87.8 87.3    265 282     BMP:   Recent Labs     22  0745 22  0348 22  0423    134* 138   K 4.1 3.6 4.1    100 103   CO2 27 25 23   BUN 17 23* 21*   CREATININE 0.9 1.0 1.1     Mag:   Lab Results   Component Value Date    MG 2.10 2022     PT/INR:   Recent Labs     22  1413   PROTIME 11.3   INR 1.00     Pro-BNP:   Lab Results   Component Value Date    PROBNP 91 2022    PROBNP 184 2022         Imagin/2022 ECG: Sinus and AF/FL    2022 CTA PE     1.  No evidence of pulmonary embolus.     2. Small bilateral pleural effusions with basilar interstitial edema and dependent passive subsegmental atelectasis     2022 CXR:  Coarse interstitial markings throughout both lungs likely    underlying emphysema with patchy and hazy increased densities in    both lung bases, mostly on the right suspicious for pneumonia    versus CHF.           Assessment:  60 y.o. with SOB and A FIB/Flutter  Issues:  -SOB  -A Fib/flutter  -HTN  -HLD    Plan:  -Keep K>4, Mg>2.  -Echo today   -Heparin   -ASA, statin   -Metoprolol 25 mg po bid     Per Dr Junior Lunch  -Depending on echo findings if there is evidence of systolic heart failure or severe aortic stenosis will plan with for coronary angiography otherwise plan for stress test.  -BNP is mildly elevated.,  If preserved LV function he will benefit from SGLT2 inhibitors followed by Gildardo Vargas NP

## 2022-04-05 NOTE — CARE COORDINATION
Case Management Assessment           Daily Note                 Date/ Time of Note: 4/5/2022 9:44 AM         Note completed by: GEOVANNA Araujo    Patient Name: Melina Cai  YOB: 1960    Diagnosis:Acute respiratory failure with hypoxia [facility]) [J96.01]  Chest pain, unspecified type [R07.9]  New onset of congestive heart failure ([facility] [I50.9]  Hypertension, unspecified type [I10]  HF (heart failure) ([facility] [I50.9]  Acute heart failure, unspecified heart failure type ([facility] [I50.9]  Patient Admission Status: Inpatient    Date of Admission:4/2/2022  4:55 AM Length of Stay: 3 GLOS: CHERYLLOS: 3.8    Current Plan of Care: TBD  ________________________________________________________________________________________  PT AM-PAC:   / 24 per last evaluation on: NA    OT AM-PAC:   / 24 per last evaluation on: NA    DME Needs for discharge: None  ________________________________________________________________________________________  Discharge Plan: Home    Tentative discharge date: TBD    Current barriers to discharge: Medical Clearance     Referrals completed: Not Applicable    Resources/ information provided: Not indicated at this time  ________________________________________________________________________________________  Case Management Notes: Patient is from home with family. Following for discharge needs pending results from stress test and Echo. Yonatan Leary and his family were provided with choice of provider; he and his family are in agreement with the discharge plan.     Care Transition Patient: [address], [address] [address]  Case Management Department  Ph: [phone]  Fax: [phone]
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
## 2022-04-06 LAB
ANION GAP SERPL CALCULATED.3IONS-SCNC: 14 MMOL/L (ref 3–16)
ANTI-XA UNFRAC HEPARIN: 0.32 IU/ML (ref 0.3–0.7)
BASOPHILS ABSOLUTE: 0.1 K/UL (ref 0–0.2)
BASOPHILS RELATIVE PERCENT: 0.6 %
BUN BLDV-MCNC: 19 MG/DL (ref 7–20)
CALCIUM SERPL-MCNC: 9.8 MG/DL (ref 8.3–10.6)
CHLORIDE BLD-SCNC: 102 MMOL/L (ref 99–110)
CO2: 22 MMOL/L (ref 21–32)
CREAT SERPL-MCNC: 1.1 MG/DL (ref 0.8–1.3)
EKG ATRIAL RATE: 416 BPM
EKG DIAGNOSIS: NORMAL
EKG Q-T INTERVAL: 374 MS
EKG QRS DURATION: 106 MS
EKG QTC CALCULATION (BAZETT): 469 MS
EKG R AXIS: 59 DEGREES
EKG T AXIS: 83 DEGREES
EKG VENTRICULAR RATE: 95 BPM
EOSINOPHILS ABSOLUTE: 0.4 K/UL (ref 0–0.6)
EOSINOPHILS RELATIVE PERCENT: 4.1 %
GFR AFRICAN AMERICAN: >60
GFR NON-AFRICAN AMERICAN: >60
GLUCOSE BLD-MCNC: 101 MG/DL (ref 70–99)
HCT VFR BLD CALC: 42.3 % (ref 40.5–52.5)
HEMOGLOBIN: 14 G/DL (ref 13.5–17.5)
LYMPHOCYTES ABSOLUTE: 2.8 K/UL (ref 1–5.1)
LYMPHOCYTES RELATIVE PERCENT: 29.2 %
MAGNESIUM: 2.3 MG/DL (ref 1.8–2.4)
MCH RBC QN AUTO: 29 PG (ref 26–34)
MCHC RBC AUTO-ENTMCNC: 33 G/DL (ref 31–36)
MCV RBC AUTO: 87.8 FL (ref 80–100)
MONOCYTES ABSOLUTE: 0.9 K/UL (ref 0–1.3)
MONOCYTES RELATIVE PERCENT: 9.4 %
NEUTROPHILS ABSOLUTE: 5.4 K/UL (ref 1.7–7.7)
NEUTROPHILS RELATIVE PERCENT: 56.7 %
PDW BLD-RTO: 14.7 % (ref 12.4–15.4)
PLATELET # BLD: 279 K/UL (ref 135–450)
PMV BLD AUTO: 9.2 FL (ref 5–10.5)
POTASSIUM SERPL-SCNC: 4 MMOL/L (ref 3.5–5.1)
RBC # BLD: 4.82 M/UL (ref 4.2–5.9)
SODIUM BLD-SCNC: 138 MMOL/L (ref 136–145)
WBC # BLD: 9.5 K/UL (ref 4–11)

## 2022-04-06 PROCEDURE — 6360000004 HC RX CONTRAST MEDICATION: Performed by: INTERNAL MEDICINE

## 2022-04-06 PROCEDURE — 99233 SBSQ HOSP IP/OBS HIGH 50: CPT | Performed by: INTERNAL MEDICINE

## 2022-04-06 PROCEDURE — 85025 COMPLETE CBC W/AUTO DIFF WBC: CPT

## 2022-04-06 PROCEDURE — B2111ZZ FLUOROSCOPY OF MULTIPLE CORONARY ARTERIES USING LOW OSMOLAR CONTRAST: ICD-10-PCS | Performed by: INTERNAL MEDICINE

## 2022-04-06 PROCEDURE — 2060000000 HC ICU INTERMEDIATE R&B

## 2022-04-06 PROCEDURE — 99153 MOD SED SAME PHYS/QHP EA: CPT

## 2022-04-06 PROCEDURE — 6370000000 HC RX 637 (ALT 250 FOR IP): Performed by: INTERNAL MEDICINE

## 2022-04-06 PROCEDURE — C1769 GUIDE WIRE: HCPCS

## 2022-04-06 PROCEDURE — 2580000003 HC RX 258: Performed by: INTERNAL MEDICINE

## 2022-04-06 PROCEDURE — 4A023N7 MEASUREMENT OF CARDIAC SAMPLING AND PRESSURE, LEFT HEART, PERCUTANEOUS APPROACH: ICD-10-PCS | Performed by: INTERNAL MEDICINE

## 2022-04-06 PROCEDURE — 2709999900 HC NON-CHARGEABLE SUPPLY

## 2022-04-06 PROCEDURE — 93458 L HRT ARTERY/VENTRICLE ANGIO: CPT

## 2022-04-06 PROCEDURE — 99152 MOD SED SAME PHYS/QHP 5/>YRS: CPT

## 2022-04-06 PROCEDURE — 93458 L HRT ARTERY/VENTRICLE ANGIO: CPT | Performed by: INTERNAL MEDICINE

## 2022-04-06 PROCEDURE — 6360000002 HC RX W HCPCS

## 2022-04-06 PROCEDURE — 80048 BASIC METABOLIC PNL TOTAL CA: CPT

## 2022-04-06 PROCEDURE — 6360000002 HC RX W HCPCS: Performed by: INTERNAL MEDICINE

## 2022-04-06 PROCEDURE — C1760 CLOSURE DEV, VASC: HCPCS

## 2022-04-06 PROCEDURE — 93567 NJX CAR CTH SPRVLV AORTGRPHY: CPT | Performed by: INTERNAL MEDICINE

## 2022-04-06 PROCEDURE — 2500000003 HC RX 250 WO HCPCS

## 2022-04-06 PROCEDURE — 99024 POSTOP FOLLOW-UP VISIT: CPT | Performed by: INTERNAL MEDICINE

## 2022-04-06 PROCEDURE — C1894 INTRO/SHEATH, NON-LASER: HCPCS

## 2022-04-06 PROCEDURE — 93010 ELECTROCARDIOGRAM REPORT: CPT | Performed by: INTERNAL MEDICINE

## 2022-04-06 PROCEDURE — 83735 ASSAY OF MAGNESIUM: CPT

## 2022-04-06 PROCEDURE — 85520 HEPARIN ASSAY: CPT

## 2022-04-06 PROCEDURE — 93005 ELECTROCARDIOGRAM TRACING: CPT | Performed by: INTERNAL MEDICINE

## 2022-04-06 PROCEDURE — B2151ZZ FLUOROSCOPY OF LEFT HEART USING LOW OSMOLAR CONTRAST: ICD-10-PCS | Performed by: INTERNAL MEDICINE

## 2022-04-06 PROCEDURE — 2500000003 HC RX 250 WO HCPCS: Performed by: NURSE PRACTITIONER

## 2022-04-06 RX ORDER — METOPROLOL SUCCINATE 25 MG/1
25 TABLET, EXTENDED RELEASE ORAL DAILY
Status: DISCONTINUED | OUTPATIENT
Start: 2022-04-06 | End: 2022-04-07

## 2022-04-06 RX ORDER — MORPHINE SULFATE 2 MG/ML
2 INJECTION, SOLUTION INTRAMUSCULAR; INTRAVENOUS
Status: COMPLETED | OUTPATIENT
Start: 2022-04-06 | End: 2022-04-06

## 2022-04-06 RX ORDER — MORPHINE SULFATE 2 MG/ML
2 INJECTION, SOLUTION INTRAMUSCULAR; INTRAVENOUS PRN
Status: DISCONTINUED | OUTPATIENT
Start: 2022-04-06 | End: 2022-04-06 | Stop reason: SDUPTHER

## 2022-04-06 RX ORDER — SODIUM CHLORIDE 9 MG/ML
INJECTION, SOLUTION INTRAVENOUS CONTINUOUS
Status: CANCELLED | OUTPATIENT
Start: 2022-04-06 | End: 2022-04-06

## 2022-04-06 RX ORDER — METOPROLOL TARTRATE 5 MG/5ML
5 INJECTION INTRAVENOUS ONCE
Status: COMPLETED | OUTPATIENT
Start: 2022-04-06 | End: 2022-04-06

## 2022-04-06 RX ORDER — ACETAMINOPHEN 325 MG/1
650 TABLET ORAL EVERY 4 HOURS PRN
Status: CANCELLED | OUTPATIENT
Start: 2022-04-06

## 2022-04-06 RX ADMIN — METOPROLOL SUCCINATE 25 MG: 25 TABLET, EXTENDED RELEASE ORAL at 09:30

## 2022-04-06 RX ADMIN — ASPIRIN 81 MG 81 MG: 81 TABLET ORAL at 08:35

## 2022-04-06 RX ADMIN — SODIUM CHLORIDE, PRESERVATIVE FREE 10 ML: 5 INJECTION INTRAVENOUS at 08:35

## 2022-04-06 RX ADMIN — ATORVASTATIN CALCIUM 40 MG: 40 TABLET, FILM COATED ORAL at 21:10

## 2022-04-06 RX ADMIN — MORPHINE SULFATE 2 MG: 2 INJECTION, SOLUTION INTRAMUSCULAR; INTRAVENOUS at 18:58

## 2022-04-06 RX ADMIN — PANTOPRAZOLE SODIUM 40 MG: 40 TABLET, DELAYED RELEASE ORAL at 07:02

## 2022-04-06 RX ADMIN — HEPARIN SODIUM 13.99 UNITS/KG/HR: 5000 INJECTION INTRAVENOUS; SUBCUTANEOUS at 11:04

## 2022-04-06 RX ADMIN — IOHEXOL 175 ML: 350 INJECTION, SOLUTION INTRAVENOUS at 15:39

## 2022-04-06 RX ADMIN — METOPROLOL TARTRATE 25 MG: 25 TABLET, FILM COATED ORAL at 08:35

## 2022-04-06 RX ADMIN — MORPHINE SULFATE 2 MG: 2 INJECTION, SOLUTION INTRAMUSCULAR; INTRAVENOUS at 17:36

## 2022-04-06 RX ADMIN — METOPROLOL TARTRATE 5 MG: 1 INJECTION, SOLUTION INTRAVENOUS at 23:43

## 2022-04-06 RX ADMIN — ACETAMINOPHEN 650 MG: 325 TABLET ORAL at 21:48

## 2022-04-06 ASSESSMENT — PAIN SCALES - GENERAL
PAINLEVEL_OUTOF10: 6
PAINLEVEL_OUTOF10: 0
PAINLEVEL_OUTOF10: 3
PAINLEVEL_OUTOF10: 7
PAINLEVEL_OUTOF10: 4
PAINLEVEL_OUTOF10: 0
PAINLEVEL_OUTOF10: 2
PAINLEVEL_OUTOF10: 9
PAINLEVEL_OUTOF10: 0
PAINLEVEL_OUTOF10: 6
PAINLEVEL_OUTOF10: 0

## 2022-04-06 ASSESSMENT — PAIN DESCRIPTION - LOCATION
LOCATION: CHEST;SHOULDER
LOCATION: OTHER (COMMENT)
LOCATION: CHEST;SHOULDER

## 2022-04-06 ASSESSMENT — PAIN DESCRIPTION - PAIN TYPE: TYPE: CHRONIC PAIN;OTHER (COMMENT)

## 2022-04-06 ASSESSMENT — PAIN DESCRIPTION - DESCRIPTORS: DESCRIPTORS: PRESSURE

## 2022-04-06 NOTE — PLAN OF CARE
Problem: Safety:  Goal: Free from accidental physical injury  Description: Free from accidental physical injury  4/6/2022 1315 by Lindell Snellen, RN  Outcome: Ongoing     Problem: Daily Care:  Goal: Daily care needs are met  Description: Daily care needs are met  4/6/2022 1315 by Lindell Snellen, RN  Outcome: Ongoing  Pt was still able to do ADLs this shift. Will continue to monitor. Problem: Pain:  Goal: Patient's pain/discomfort is manageable  Description: Patient's pain/discomfort is manageable  4/6/2022 1315 by Lindell Snellen, RN  Outcome: Ongoing  Pt did not c/o pain this shift. Will continue to monitor. Problem: Skin Integrity:  Goal: Skin integrity will stabilize  Description: Skin integrity will stabilize  4/6/2022 1315 by Lindell Snellen, RN  Outcome: Ongoing     Problem: OXYGENATION/RESPIRATORY FUNCTION  Goal: Patient will maintain patent airway  4/6/2022 1315 by Lindell Snellen, RN  Outcome: Ongoing     Problem: FLUID AND ELECTROLYTE IMBALANCE  Goal: Fluid and electrolyte balance are achieved/maintained  Outcome: Ongoing     Problem: Falls - Risk of:  Goal: Will remain free from falls  Description: Will remain free from falls  4/6/2022 1315 by Lindell Snellen, RN  Outcome: Ongoing  Pt remained free from falls, pt ortho (-), up ad gerardo. Will continue to monitor.

## 2022-04-06 NOTE — PLAN OF CARE
Problem: Pain:  Goal: Patient's pain/discomfort is manageable  Description: Patient's pain/discomfort is manageable  Outcome: Ongoing  Pt has had no pain this shift. Pt educated to call out if pain occurs. Will continue to monitor. Problem: Skin Integrity:  Goal: Skin integrity will stabilize  Description: Skin integrity will stabilize  Outcome: Ongoing  Pt has no s/s of skin breakdown. Pt stated the swelling in his hand and legs is much better and he can now make a fist. Will continue to monitor. Problem: OXYGENATION/RESPIRATORY FUNCTION  Goal: Patient will maintain patent airway  Outcome: Ongoing  Pt did not need any supplemental oxygen this shift. PT did not complain of SOB. Pt's o2 levels were stable. Will continue to monitor. Problem: ACTIVITY INTOLERANCE/IMPAIRED MOBILITY  Goal: Mobility/activity is maintained at optimum level for patient  Outcome: Ongoing  Pt's HR went into the 130/140's when getting up to use the restroom or sitting up to eat. Notified HCP. Pt remained tachy after two rounds of PRN Metoprolol. Will continue to monitor. Problem: Falls - Risk of:  Goal: Will remain free from falls  Description: Will remain free from falls  Outcome: Ongoing  Orthostatic vital signs obtained at start of shift - see flowsheet for details. Pt does not meet criteria for orthostasis. Pt is a Med fall risk. See Haroldine Good Fall Score and ABCDS Injury Risk assessments. - Screening for Orthostasis AND not a [place] Risk per CHÁVEZ/ABCDS: Pt bed is in low position, side rails up, call light and belongings are in reach. Fall risk light is on outside pts room. Pt encouraged to call for assistance as needed. Will continue with hourly rounds for PO intake, pain needs, toileting and repositioning as needed.

## 2022-04-06 NOTE — PLAN OF CARE
Problem: Daily Care:  Goal: Daily care needs are met  Description: Daily care needs are met  Outcome: Met This Shift     Problem: Safety:  Goal: Free from accidental physical injury  Description: Free from accidental physical injury  Outcome: Ongoing  Note:   Pt is up ad gerardo. Able to call nursing staff if assistance is needed. Non-skid socks worn while out of bed. Problem: Pain:  Goal: Patient's pain/discomfort is manageable  Description: Patient's pain/discomfort is manageable  Outcome: Ongoing  Note: No complaints of pain this shift. Problem: Skin Integrity:  Goal: Skin integrity will stabilize  Description: Skin integrity will stabilize  Outcome: Ongoing  Note: No s/s on skin breakdown noted. Problem: OXYGENATION/RESPIRATORY FUNCTION  Goal: Patient will maintain patent airway  Outcome: Ongoing  Note: Pt on room air, lungs clear. O2 in mid to upper 90's this shift. Problem: Falls - Risk of:  Goal: Will remain free from falls  Description: Will remain free from falls  Outcome: Ongoing  Note: Orthostatic vital signs obtained at start of shift - see flowsheet for details. Pt does not meet criteria for orthostasis. Pt is a Med fall risk. See Kathern Bellow Fall Score and ABCDS Injury Risk assessments. - Screening for Orthostasis AND not a [place] Risk per CHÁVEZ/ABCDS: Pt bed is in low position, side rails up, call light and belongings are in reach. Fall risk light is on outside pts room. Pt encouraged to call for assistance as needed. Will continue with hourly rounds for PO intake, pain needs, toileting and repositioning as needed.

## 2022-04-06 NOTE — PROCEDURES
[facility]                                                LEFT HEART CATH    Cha Cassette   60 y.o., male  1960 4/6/2022    Procedure performed by Dr. Neto Blackwell MD, [facility] [place]  Surgical assistants :none    Procedure  Selective Coronary Angiography  Cardiac Catheterization for Coronary Anatomy  Left Heart Catheterization  Left Ventriculogram  Aortic root injection  Radial artery access assisted by ultrasound  Arterial Access Right Radial Artery after a negative Arcadio test  TR Band    Any and all anesthesia was administered by my staff under my direct supervision and with me personally monitoring the patient    Indication:Cardiac cath to rule out ischemic CAD, Possible angioplasty, The procedure and risks described to patient including risk of CVA, MI, bleeding, emergency surgery, death, aortic valve stenosis. Borderline LV dysfunction by echocardiograph. Or Consent signed  Unspecified Angina  Anesthesia: Moderate sedation with Versed and Fentanyl IV  Anesthesia Start time 7856  Anesthesia end time 1537  Estimated blood loss :minimal  Abnormal Stress Test    CPT code 87841  CPT code 45089  CPT code 74774  CPT code 53560   CPT code 27659         Procedure Description:  After written informed consent was obtained, the patient was   brought to the cardiac catheterization suite, where patient was prepped and   draped in the usual sterile fashion. Local anesthesia was achieved in the   right wrist with 2% lidocaine. A 5-Danish hemostasis sheath was placed into   the radial artery. The pre cocktail of heparin, verapamil and nitroglycerin was injected into the sheath    The JR4 catheter was introduced  to engage the right coronary   artery.  Radiographic images were obtained. The catheter was removed and exchanged over an exchange length 0.35 soft guide wire. .     A 5-Kyrgyz angled pigtail catheter was then positioned in the left ventricle. Left ventriculogram was performed. After these images were obtained. , the   catheter was flushed, pulled back across the aortic valve revealing no gradient. The catheter was removed. A 5-Kyrgyz JL 3.5 catheter was introduced; used to engage the left main   coronary artery. Radiographic  images were obtained. The catheter was pulled back . We did try a JL4 and subsequently wind up using an AL-1 diagnostic catheter to complete the left coronary artery injections. The hemostasis sheath was removed and hemostasis was achieved using a TR Band     There were no complications. Patient tolerated the procedure well. The   patient was transferred to the holding area in stable condition. Contrast consumed 160 cc 11.2 minutes  Flouro time   11.2 minutes radiation exposure 1398.73   milligray    Results:  Left ventricular pressure 4 mmHg  Aortic pressure 97/65 mmHg  Aortic valve gradient 25 mm    Coronary anatomy:   The left main coronary artery is normal.     Left anterior descending artery is normal    Circumflex artery is normal.    The right coronary artery is a dominant vessel and has proximal and mid calcification. Stenosis may be as much as 35 to 40%. Left ventriculogram shows ejection fraction of 50 to 55%. Normal wall motion. Aortic root injection. The size is normal.  There is mild to moderate aortic valve insufficiency. Impression:  Mild to moderate CAD in the right coronary artery proximally. Fibrocalcific aortic valve stenosis with a gradient measured of 25 mm  Aortic valve insufficiency which is mild to moderate        Complications: none      Plan:  Continue current medical management. I think this patient's aortic valve stenosis will progress and he also has aortic valve insufficiency.   The question is whether he should have continuous medical management or surgical intervention or perhaps even an ever procedure. We will ask both teams to evaluate consider. This note was likely completed using voice recognition technology and may contain unintended errors  Gilma Dickerson MD , Germain Calderon; No primary care provider on file.
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
## 2022-04-06 NOTE — CONSULTS
[facility]  Cardiology Inpatient Consult Service  Daily Progress Note        Admit Date:  4/2/2022    Referring Physician: Rosaura Paz MD    Reason for Consultation/Chief Complaint:   Acute systolic heart failure/A. Fib    Subjective: Interval history:  ASIA  EF down 40%    Medications:   pantoprazole  40 mg Oral QAM AC    metoprolol tartrate  25 mg Oral BID    sodium chloride flush  5-40 mL IntraVENous 2 times per day    aspirin  81 mg Oral Daily    atorvastatin  40 mg Oral Nightly       IV drips:   heparin (PORCINE) Infusion 14 Units/kg/hr (04/05/22 2228)    sodium chloride         PRN:  heparin (porcine), heparin (porcine), sodium chloride flush, sodium chloride, ondansetron **OR** ondansetron, polyethylene glycol, acetaminophen **OR** acetaminophen      Objective:     Vitals:    04/05/22 2332 04/06/22 0344 04/06/22 0825 04/06/22 0831   BP: 126/82 (!) 103/56 (!) 132/91    Pulse: 103 95 109    Resp: 18 18 16    Temp: 98.3 °F (36.8 °C) 97.5 °F (36.4 °C) 98.5 °F (36.9 °C)    TempSrc: Oral Oral Oral    SpO2: 97% 97% 96%    Weight:    (!) 307 lb 12.8 oz (139.6 kg)   Height:           Intake/Output Summary (Last 24 hours) at 4/6/2022 0918  Last data filed at 4/6/2022 0645  Gross per 24 hour   Intake 958.94 ml   Output 3350 ml   Net -2391.06 ml     I/O last 3 completed shifts:   In: 1288.9 [P.O.:860; I.V.:428.9]  Out: 4350 [Urine:4350]  Wt Readings from Last 3 Encounters:   04/06/22 (!) 307 lb 12.8 oz (139.6 kg)   12/18/18 265 lb (120.2 kg)   06/26/18 257 lb 8 oz (116.8 kg)       Admit Wt: Weight: (!) 321 lb 1.6 oz (145.7 kg)   Todays Wt: Weight: (!) 307 lb 12.8 oz (139.6 kg)    TELEMETRY: Personally interpreted Atrial fibrillation     Physical Exam:     General:  Awake, alert, NAD  Skin:  Warm and dry  Neck:  -JVP  Chest:  Clear to auscultation, respiration normal  Cardiovascular:  RRR S1S2  Abdomen:  Soft -  Extremities:  Mild BL  edema      Objective    Labs:   Recent Labs coronary disease and assess AV gradient  -Transition to metoprolol XL 25 mg  -Add Entresto after cath  -Add Jardiance as an outpatient    Thank you for allowing to us to participate in the care of Alcides Aranda. Please call our service with questions. All questions and concerns were addressed to the patient/family. Alternatives to my treatment were discussed. The note was completed using EMR. Every effort was made to ensure accuracy; however, inadvertent computerized transcription errors may be present. I have personally reviewed the reports and images of labs, radiological studies, cardiac studies including ECG's and telemetry, current and old medical records. Denny Fletcher MD, [facility] [place], [facility] [address]    4/6/2022 9:18 AM

## 2022-04-06 NOTE — PROGRESS NOTES
Physician Progress Note      Noel Abbott  MEHREEN #:                  [id number]  :                       1960  ADMIT DATE:       2022 4:55 AM  DISCH DATE:  RESPONDING  PROVIDER #:        Reyna Blake MD          QUERY TEXT:    Pt admitted with Acute Diastolic CHF  Pt noted to also have HTN and . If   possible, please document in progress notes and discharge summary the etiology   of CHF, if able to be determined. The medical record reflects the following:  Risk Factors: Severe Aortic Stenosis and HTN  Clinical Indicators: H&P: \"Dyspnea, indigestion symptoms concerning for CHF,   also possible COPD however no wheezing on exam after administration of lasix. Echo in 2018 with milld-moderate aortic stenosis, LVH, R atrium mildly   dilated, EF 55%. Jaden Massed"  Cardiology consult: HTN: severe aortic stenosis will plan   with for coronary angiography otherwise plan for stress test.  Treatment: Echo, Card consult, Poss stress test vs coronary angiography  Options provided:  -- CHF due to Hypertensive Heart Disease and Valvular Heart Disease  -- CHF not due to Hypertension but due to valvular heart disease  -- CHF due to Hypertensive Heart Disease  -- Other - I will add my own diagnosis  -- Disagree - Not applicable / Not valid  -- Disagree - Clinically unable to determine / Unknown  -- Refer to Clinical Documentation Reviewer    PROVIDER RESPONSE TEXT:    This patient has CHF due to hypertensive heart disease and valvular heart   disease.     Query created by: Beny Gallardo on 2022 1:08 PM      Electronically signed by:  Reyna Blake MD 2022 9:14 PM
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
## 2022-04-07 LAB
ANION GAP SERPL CALCULATED.3IONS-SCNC: 12 MMOL/L (ref 3–16)
ANTI-XA UNFRAC HEPARIN: 0.4 IU/ML (ref 0.3–0.7)
BASOPHILS ABSOLUTE: 0 K/UL (ref 0–0.2)
BASOPHILS RELATIVE PERCENT: 0.2 %
BUN BLDV-MCNC: 21 MG/DL (ref 7–20)
CALCIUM SERPL-MCNC: 9.4 MG/DL (ref 8.3–10.6)
CHLORIDE BLD-SCNC: 103 MMOL/L (ref 99–110)
CO2: 23 MMOL/L (ref 21–32)
CREAT SERPL-MCNC: 0.9 MG/DL (ref 0.8–1.3)
EOSINOPHILS ABSOLUTE: 0.2 K/UL (ref 0–0.6)
EOSINOPHILS RELATIVE PERCENT: 1.5 %
GFR AFRICAN AMERICAN: >60
GFR NON-AFRICAN AMERICAN: >60
GLUCOSE BLD-MCNC: 103 MG/DL (ref 70–99)
HCT VFR BLD CALC: 41.2 % (ref 40.5–52.5)
HEMOGLOBIN: 13.5 G/DL (ref 13.5–17.5)
LYMPHOCYTES ABSOLUTE: 2.4 K/UL (ref 1–5.1)
LYMPHOCYTES RELATIVE PERCENT: 21.1 %
MAGNESIUM: 2.2 MG/DL (ref 1.8–2.4)
MCH RBC QN AUTO: 28.5 PG (ref 26–34)
MCHC RBC AUTO-ENTMCNC: 32.7 G/DL (ref 31–36)
MCV RBC AUTO: 87.1 FL (ref 80–100)
MONOCYTES ABSOLUTE: 1.1 K/UL (ref 0–1.3)
MONOCYTES RELATIVE PERCENT: 10 %
NEUTROPHILS ABSOLUTE: 7.7 K/UL (ref 1.7–7.7)
NEUTROPHILS RELATIVE PERCENT: 67.2 %
PDW BLD-RTO: 14.6 % (ref 12.4–15.4)
PLATELET # BLD: 281 K/UL (ref 135–450)
PMV BLD AUTO: 8.6 FL (ref 5–10.5)
POTASSIUM SERPL-SCNC: 4.1 MMOL/L (ref 3.5–5.1)
RBC # BLD: 4.73 M/UL (ref 4.2–5.9)
SODIUM BLD-SCNC: 138 MMOL/L (ref 136–145)
WBC # BLD: 11.5 K/UL (ref 4–11)

## 2022-04-07 PROCEDURE — 2500000003 HC RX 250 WO HCPCS: Performed by: INTERNAL MEDICINE

## 2022-04-07 PROCEDURE — 6370000000 HC RX 637 (ALT 250 FOR IP): Performed by: INTERNAL MEDICINE

## 2022-04-07 PROCEDURE — 99233 SBSQ HOSP IP/OBS HIGH 50: CPT | Performed by: INTERNAL MEDICINE

## 2022-04-07 PROCEDURE — 2060000000 HC ICU INTERMEDIATE R&B

## 2022-04-07 PROCEDURE — 6360000002 HC RX W HCPCS: Performed by: INTERNAL MEDICINE

## 2022-04-07 PROCEDURE — 83735 ASSAY OF MAGNESIUM: CPT

## 2022-04-07 PROCEDURE — 85520 HEPARIN ASSAY: CPT

## 2022-04-07 PROCEDURE — 99223 1ST HOSP IP/OBS HIGH 75: CPT | Performed by: INTERNAL MEDICINE

## 2022-04-07 PROCEDURE — 2580000003 HC RX 258: Performed by: INTERNAL MEDICINE

## 2022-04-07 PROCEDURE — 80048 BASIC METABOLIC PNL TOTAL CA: CPT

## 2022-04-07 PROCEDURE — 85025 COMPLETE CBC W/AUTO DIFF WBC: CPT

## 2022-04-07 PROCEDURE — 99222 1ST HOSP IP/OBS MODERATE 55: CPT | Performed by: THORACIC SURGERY (CARDIOTHORACIC VASCULAR SURGERY)

## 2022-04-07 PROCEDURE — 36415 COLL VENOUS BLD VENIPUNCTURE: CPT

## 2022-04-07 RX ORDER — SODIUM CHLORIDE 9 MG/ML
25 INJECTION, SOLUTION INTRAVENOUS PRN
Status: CANCELLED | OUTPATIENT
Start: 2022-04-07

## 2022-04-07 RX ORDER — METOPROLOL SUCCINATE 50 MG/1
50 TABLET, EXTENDED RELEASE ORAL DAILY
Status: DISCONTINUED | OUTPATIENT
Start: 2022-04-08 | End: 2022-04-08 | Stop reason: HOSPADM

## 2022-04-07 RX ORDER — SODIUM CHLORIDE 9 MG/ML
INJECTION, SOLUTION INTRAVENOUS CONTINUOUS
Status: CANCELLED | OUTPATIENT
Start: 2022-04-07

## 2022-04-07 RX ORDER — METOPROLOL TARTRATE 5 MG/5ML
5 INJECTION INTRAVENOUS ONCE
Status: COMPLETED | OUTPATIENT
Start: 2022-04-07 | End: 2022-04-07

## 2022-04-07 RX ORDER — METOPROLOL SUCCINATE 25 MG/1
25 TABLET, EXTENDED RELEASE ORAL ONCE
Status: COMPLETED | OUTPATIENT
Start: 2022-04-07 | End: 2022-04-07

## 2022-04-07 RX ORDER — LORAZEPAM 1 MG/1
1 TABLET ORAL ONCE
Status: COMPLETED | OUTPATIENT
Start: 2022-04-07 | End: 2022-04-07

## 2022-04-07 RX ORDER — SODIUM CHLORIDE 0.9 % (FLUSH) 0.9 %
5-40 SYRINGE (ML) INJECTION EVERY 12 HOURS SCHEDULED
Status: CANCELLED | OUTPATIENT
Start: 2022-04-07

## 2022-04-07 RX ORDER — SODIUM CHLORIDE 0.9 % (FLUSH) 0.9 %
5-40 SYRINGE (ML) INJECTION PRN
Status: CANCELLED | OUTPATIENT
Start: 2022-04-07

## 2022-04-07 RX ADMIN — SODIUM CHLORIDE, PRESERVATIVE FREE 10 ML: 5 INJECTION INTRAVENOUS at 20:28

## 2022-04-07 RX ADMIN — AMIODARONE HYDROCHLORIDE 1 MG/MIN: 50 INJECTION, SOLUTION INTRAVENOUS at 21:04

## 2022-04-07 RX ADMIN — SACUBITRIL AND VALSARTAN 1 TABLET: 24; 26 TABLET, FILM COATED ORAL at 21:04

## 2022-04-07 RX ADMIN — SACUBITRIL AND VALSARTAN 1 TABLET: 24; 26 TABLET, FILM COATED ORAL at 08:30

## 2022-04-07 RX ADMIN — ASPIRIN 81 MG 81 MG: 81 TABLET ORAL at 08:30

## 2022-04-07 RX ADMIN — METOPROLOL SUCCINATE 25 MG: 25 TABLET, EXTENDED RELEASE ORAL at 08:30

## 2022-04-07 RX ADMIN — APIXABAN 5 MG: 5 TABLET, FILM COATED ORAL at 14:58

## 2022-04-07 RX ADMIN — HEPARIN SODIUM 13.99 UNITS/KG/HR: 5000 INJECTION INTRAVENOUS; SUBCUTANEOUS at 04:28

## 2022-04-07 RX ADMIN — PANTOPRAZOLE SODIUM 40 MG: 40 TABLET, DELAYED RELEASE ORAL at 05:26

## 2022-04-07 RX ADMIN — METOPROLOL TARTRATE 5 MG: 1 INJECTION, SOLUTION INTRAVENOUS at 11:59

## 2022-04-07 RX ADMIN — LORAZEPAM 1 MG: 1 TABLET ORAL at 20:28

## 2022-04-07 RX ADMIN — APIXABAN 5 MG: 5 TABLET, FILM COATED ORAL at 20:29

## 2022-04-07 RX ADMIN — ATORVASTATIN CALCIUM 40 MG: 40 TABLET, FILM COATED ORAL at 20:28

## 2022-04-07 RX ADMIN — METOPROLOL SUCCINATE 25 MG: 25 TABLET, EXTENDED RELEASE ORAL at 11:59

## 2022-04-07 RX ADMIN — POLYETHYLENE GLYCOL 3350 17 G: 17 POWDER, FOR SOLUTION ORAL at 08:31

## 2022-04-07 ASSESSMENT — PAIN SCALES - GENERAL
PAINLEVEL_OUTOF10: 0
PAINLEVEL_OUTOF10: 4

## 2022-04-07 NOTE — PLAN OF CARE
Problem: Safety:  Goal: Free from accidental physical injury  Description: Free from accidental physical injury  Outcome: Ongoing  Note: Patient safety measures in place for fall prevention; call light and belongings within reach, non-slip socks, call don't fall signs posted. Patient up ad gerardo, ortho negative. Problem: Safety:  Goal: Free from intentional harm  Description: Free from intentional harm  Outcome: Ongoing  Note: Patient safety measures in place for fall prevention; call light and belongings within reach, non-slip socks, call don't fall signs posted. Patient up ad gerardo, ortho negative. Problem: Daily Care:  Goal: Daily care needs are met  Description: Daily care needs are met  Outcome: Ongoing  Note: Patient able to verbalize daily care needs. Patient denies any needs at this time. Problem: Pain:  Goal: Patient's pain/discomfort is manageable  Description: Patient's pain/discomfort is manageable  4/7/2022 1108 by Caleb Sherman RN  Outcome: Ongoing  Note: Patient denies any pain at this time. Will reassess per protocol. Problem: Skin Integrity:  Goal: Skin integrity will stabilize  Description: Skin integrity will stabilize  4/7/2022 1108 by Caleb Sherman RN  Outcome: Ongoing  Note: Right radial cath site intact without any signs of bleeding/hematoma. Skin intact otherwise.

## 2022-04-07 NOTE — PROGRESS NOTES
[address] Daily Progress Note      Admit Date:  4/2/2022    Subjective:  Mr. Rajni Lowry is a 62M with HTN, pre T2DM, tobacco 30 pack year history (quit 1.5 years ago) who presented with SOB. We are following for acute systolic heart failure and A. Fib. No acute overnight events. Denies SOB, chest pain, palpitations, or pain. Seen with wife and daughter in room. States he is overall feeling better. Cath 4/6 showed: mild-moderate CAD in right coronary artery proximally, fibrocalcific aortic valve stenosis with gradient 25mm, mild-moderate aortic valve insufficiency.     Objective:   /71   Pulse 118   Temp 98.7 °F (37.1 °C) (Oral)   Resp 17   Ht 6' 2\" (1.88 m)   Wt (!) 309 lb 14.4 oz (140.6 kg)   SpO2 96%   BMI 39.79 kg/m²     Intake/Output Summary (Last 24 hours) at 4/7/2022 1009  Last data filed at 4/7/2022 0528  Gross per 24 hour   Intake 409.17 ml   Output 1475 ml   Net -1065.83 ml       TELEMETRY: {Findings; monitor ecg rhythm:04202}     Physical Exam:  General:  Awake, alert, NAD  Skin:  Warm and dry  Neck:  Negative JVP  Chest:  Clear to auscultation, respiration normal  Cardiovascular:  RRR S1S2  Abdomen:  Soft  Extremities:  Mild bilateral edema    Medications:    metoprolol succinate  25 mg Oral Daily    sacubitril-valsartan  1 tablet Oral BID    pantoprazole  40 mg Oral QAM AC    sodium chloride flush  5-40 mL IntraVENous 2 times per day    aspirin  81 mg Oral Daily    atorvastatin  40 mg Oral Nightly      heparin (PORCINE) Infusion 13.989 Units/kg/hr (04/07/22 0527)    sodium chloride       heparin (porcine), heparin (porcine), sodium chloride flush, sodium chloride, ondansetron **OR** ondansetron, polyethylene glycol, acetaminophen **OR** acetaminophen    Lab Data:  CBC:   Recent Labs     04/05/22  0423 04/06/22  0322 04/07/22  0434   WBC 10.1 9.5 11.5*   HGB 12.9* 14.0 13.5   HCT 38.7* 42.3 41.2   MCV 87.3 87.8 87.1    279 281     BMP:   Recent Labs Pt up ad nesha and med compliant. Pt appeared isolative, depressed. Pt seen relaxing in bed, up on unit during Writer's shift. Pt denies current suicidal ideation, does not endorse plan/intent. Pt denies current homicidal ideation does not endorse plan/intent. Pt denies auditory and visual hallucinations. Pt progressing through opiate detox (see flow sheet). PRN hydroxyzine given for anxiety, zofran given for nausea. Pt agrees to notify staff of any safety concerns during shift. Will continue to follow plan of care.      04/05/22  0423 04/06/22  0322 04/07/22  0434    138 138   K 4.1 4.0 4.1    102 103   CO2 23 22 23   BUN 21* 19 21*   CREATININE 1.1 1.1 0.9     LIVER PROFILE: No results for input(s): AST, ALT, LIPASE, BILIDIR, BILITOT, ALKPHOS in the last 72 hours. Invalid input(s): AMYLASE,  ALB  PT/INR: No results for input(s): PROTIME, INR in the last 72 hours. APTT: No results for input(s): APTT in the last 72 hours. BNP:  No results for input(s): BNP in the last 72 hours. IMAGING:     TTE 4/5:  Normal left ventricle size with mild concentric left ventricular hypertrophy.   Overall left ventricular systolic function appears mildly reduced with an ejection fraction of 40-45%.   Mild global hypokinesis.   Diastolic filling parameters suggest grade I diastolic dysfunction.   Mild mitral regurgitation   Mild aortic regurgitation.   Moderate aortic stenosis with a peak velocity of 3.75m/s and a mean pressure gradient of 34 mmHg.   The right atrium is dilated. Assessment:  Patient Active Problem List    Diagnosis Date Noted    Acute respiratory failure with hypoxia ([address]     HF (heart failure) ([address] 04/02/2022    Acute heart failure ([address] 04/02/2022    Atypical chest pain 06/25/2018    HTN (hypertension) 06/25/2018    HLD (hyperlipidemia) 06/25/2018    Unstable angina ([address] 06/25/2018    Smoker        Plan:  1. Cath 4/6 showed mild-moderate CAD in right coronary artery, fibrocalcific aortic valve stenosis with gradient 25mm, mild-moderate aortic valve insufficiency  2. TTE 4/5 showed L ventricular EF 40-45%  3. Increase Metoprolol XL 50mg  4. Continue Entresto 24-25mg  5. Continue Lasix 40mg PO OD  6. Continue anticoagulation heparin for A.fib management  7. Consider A.fib ablation following treatment for heart failure for better rate control  8.  Add Jardiance outpatient    Core Measures:  · Discharge instructions:   · LVEF documented:   · ACEI for LV dysfunction:   · Smoking Cessation:    Nataliya Clancy, Med Student 4 4/7/2022 10:09 AM

## 2022-04-07 NOTE — PLAN OF CARE
Problem: Pain:  Goal: Patient's pain/discomfort is manageable  Description: Patient's pain/discomfort is manageable  4/7/2022 0242 by Lola Castaneda RN  Outcome: Ongoing  Note: Patient reported 7/10 arthritic pain in his joints with some slight pressure on his chest. Verbalizes understanding of pain scale and non-pharmaceutical pain interventions. Tylenol PO given. Pt reports feeling \"much better\" and was able to rest. Will continue to monitor. Problem: Skin Integrity:  Goal: Skin integrity will stabilize  Description: Skin integrity will stabilize  4/7/2022 0242 by Lola Castaneda RN  Outcome: Ongoing  Note: Currently no areas of skin breakdown. Pt is able to and frequently turns self. Pt has R radial cath site; pressure cuff was removed in intervals, per protocol. Site is approximated with no bleeding or oozing. Will continue to monitor. Problem: Falls - Risk of:  Goal: Will remain free from falls  Description: Will remain free from falls  4/7/2022 0242 by Lola Castaneda RN  Outcome: Ongoing  Note: Orthostatic vital signs obtained at start of shift - see flowsheet for details. Pt does not meet criteria for orthostasis. Pt is a Med fall risk. See Ragena Papa Fall Score and ABCDS Injury Risk assessments. Pt bed is in low position, side rails up, call light and belongings are in reach. Fall risk light is on outside pts room. Pt encouraged to call for assistance as needed. Will continue with hourly rounds for PO intake, pain needs, toileting and repositioning as needed. Problem: Bleeding:  Goal: Will show no signs and symptoms of excessive bleeding  Description: Will show no signs and symptoms of excessive bleeding  Outcome: Ongoing  Note:   Patient's hemoglobin this AM: Recent Labs     04/07/22  0434   HGB 13.5     Patient's platelet count this AM:   Recent Labs     04/07/22 0434       Thrombocytopenia not present at this time. Patient showing no signs or symptoms of active bleeding. Transfusion not indicated at this time. Patient verbalizes understanding of all instructions. Will continue to assess and implement POC. Call light within reach and hourly rounding in place.
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
## 2022-04-07 NOTE — CONSULTS
[facility]  Cardiology Inpatient Consult Service  Daily Progress Note        Admit Date:  4/2/2022    Referring Physician: Chris Roach MD    Reason for Consultation/Chief Complaint:   Acute systolic heart failure/A. Fib    Subjective: Interval history:  ASIA  Good urine output    Medications:   [START ON 4/8/2022] metoprolol succinate  50 mg Oral Daily    metoprolol succinate  25 mg Oral Once    metoprolol  5 mg IntraVENous Once    sacubitril-valsartan  1 tablet Oral BID    pantoprazole  40 mg Oral QAM AC    sodium chloride flush  5-40 mL IntraVENous 2 times per day    aspirin  81 mg Oral Daily    atorvastatin  40 mg Oral Nightly       IV drips:   heparin (PORCINE) Infusion 13.989 Units/kg/hr (04/07/22 0527)    sodium chloride         PRN:  heparin (porcine), heparin (porcine), sodium chloride flush, sodium chloride, ondansetron **OR** ondansetron, polyethylene glycol, acetaminophen **OR** acetaminophen      Objective:     Vitals:    04/06/22 2337 04/07/22 0400 04/07/22 0600 04/07/22 0827   BP: 123/75   115/71   Pulse: 123   118   Resp: 14   17   Temp: 98.1 °F (36.7 °C) 98.1 °F (36.7 °C)  98.7 °F (37.1 °C)   TempSrc: Oral Oral  Oral   SpO2: 93% 95%  96%   Weight:   (!) 309 lb 14.4 oz (140.6 kg)    Height:           Intake/Output Summary (Last 24 hours) at 4/7/2022 1021  Last data filed at 4/7/2022 0528  Gross per 24 hour   Intake 409.17 ml   Output 1475 ml   Net -1065.83 ml     I/O last 3 completed shifts: In: 1368.1 [P.O.:740;  I.V.:628.1]  Out: 3875 [Urine:3875]  Wt Readings from Last 3 Encounters:   04/07/22 (!) 309 lb 14.4 oz (140.6 kg)   12/18/18 265 lb (120.2 kg)   06/26/18 257 lb 8 oz (116.8 kg)       Admit Wt: Weight: (!) 321 lb 1.6 oz (145.7 kg)   Todays Wt: Weight: (!) 309 lb 14.4 oz (140.6 kg)    TELEMETRY: Personally interpreted Atrial fibrillation     Physical Exam:     General:  Awake, alert, NAD  Skin:  Warm and dry  Neck:  -JVP  Chest:  Clear to perspective, eliquis on dc  -EP for afib  -Cath non obs CAD, no significant AS gradient  -Metop XL 50mg  -entresto low dose  -add jardiance   -Lasix 40mg po daily  -BNP on discharge     Thank you for allowing to us to participate in the care of Kirsten and Hina. Please call our service with questions. All questions and concerns were addressed to the patient/family. Alternatives to my treatment were discussed. The note was completed using EMR. Every effort was made to ensure accuracy; however, inadvertent computerized transcription errors may be present. I have personally reviewed the reports and images of labs, radiological studies, cardiac studies including ECG's and telemetry, current and old medical records. Denny Hdz MD, [facility] [address]    4/7/2022 10:21 AM
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
## 2022-04-08 ENCOUNTER — ANESTHESIA EVENT (OUTPATIENT)
Dept: CARDIAC CATH/INVASIVE PROCEDURES | Age: 62
DRG: 286 | End: 2022-04-08
Payer: MEDICARE

## 2022-04-08 ENCOUNTER — ANESTHESIA (OUTPATIENT)
Dept: CARDIAC CATH/INVASIVE PROCEDURES | Age: 62
DRG: 286 | End: 2022-04-08
Payer: MEDICARE

## 2022-04-08 VITALS
OXYGEN SATURATION: 96 % | BODY MASS INDEX: 39.26 KG/M2 | RESPIRATION RATE: 16 BRPM | WEIGHT: 305.9 LBS | DIASTOLIC BLOOD PRESSURE: 67 MMHG | HEART RATE: 78 BPM | HEIGHT: 74 IN | TEMPERATURE: 97.5 F | SYSTOLIC BLOOD PRESSURE: 101 MMHG

## 2022-04-08 VITALS — OXYGEN SATURATION: 100 % | SYSTOLIC BLOOD PRESSURE: 117 MMHG | DIASTOLIC BLOOD PRESSURE: 71 MMHG

## 2022-04-08 LAB
ANION GAP SERPL CALCULATED.3IONS-SCNC: 12 MMOL/L (ref 3–16)
ANTI-XA UNFRAC HEPARIN: 0.46 IU/ML (ref 0.3–0.7)
BASOPHILS ABSOLUTE: 0.1 K/UL (ref 0–0.2)
BASOPHILS RELATIVE PERCENT: 0.9 %
BUN BLDV-MCNC: 17 MG/DL (ref 7–20)
CALCIUM SERPL-MCNC: 9.6 MG/DL (ref 8.3–10.6)
CHLORIDE BLD-SCNC: 103 MMOL/L (ref 99–110)
CO2: 22 MMOL/L (ref 21–32)
CREAT SERPL-MCNC: 1.1 MG/DL (ref 0.8–1.3)
EOSINOPHILS ABSOLUTE: 0.4 K/UL (ref 0–0.6)
EOSINOPHILS RELATIVE PERCENT: 3.1 %
GFR AFRICAN AMERICAN: >60
GFR NON-AFRICAN AMERICAN: >60
GLUCOSE BLD-MCNC: 121 MG/DL (ref 70–99)
HCT VFR BLD CALC: 41.8 % (ref 40.5–52.5)
HEMOGLOBIN: 14.1 G/DL (ref 13.5–17.5)
LYMPHOCYTES ABSOLUTE: 1.9 K/UL (ref 1–5.1)
LYMPHOCYTES RELATIVE PERCENT: 16.4 %
MCH RBC QN AUTO: 29.1 PG (ref 26–34)
MCHC RBC AUTO-ENTMCNC: 33.7 G/DL (ref 31–36)
MCV RBC AUTO: 86.5 FL (ref 80–100)
MONOCYTES ABSOLUTE: 1.2 K/UL (ref 0–1.3)
MONOCYTES RELATIVE PERCENT: 10 %
NEUTROPHILS ABSOLUTE: 8.1 K/UL (ref 1.7–7.7)
NEUTROPHILS RELATIVE PERCENT: 69.6 %
PDW BLD-RTO: 14.7 % (ref 12.4–15.4)
PLATELET # BLD: 272 K/UL (ref 135–450)
PMV BLD AUTO: 8.8 FL (ref 5–10.5)
POTASSIUM SERPL-SCNC: 4.5 MMOL/L (ref 3.5–5.1)
PRO-BNP: 200 PG/ML (ref 0–124)
RBC # BLD: 4.83 M/UL (ref 4.2–5.9)
SODIUM BLD-SCNC: 137 MMOL/L (ref 136–145)
WBC # BLD: 11.6 K/UL (ref 4–11)

## 2022-04-08 PROCEDURE — 80048 BASIC METABOLIC PNL TOTAL CA: CPT

## 2022-04-08 PROCEDURE — 2580000003 HC RX 258: Performed by: INTERNAL MEDICINE

## 2022-04-08 PROCEDURE — 36415 COLL VENOUS BLD VENIPUNCTURE: CPT

## 2022-04-08 PROCEDURE — 5A2204Z RESTORATION OF CARDIAC RHYTHM, SINGLE: ICD-10-PCS | Performed by: INTERNAL MEDICINE

## 2022-04-08 PROCEDURE — 85025 COMPLETE CBC W/AUTO DIFF WBC: CPT

## 2022-04-08 PROCEDURE — 6360000002 HC RX W HCPCS: Performed by: NURSE ANESTHETIST, CERTIFIED REGISTERED

## 2022-04-08 PROCEDURE — 6370000000 HC RX 637 (ALT 250 FOR IP): Performed by: INTERNAL MEDICINE

## 2022-04-08 PROCEDURE — 99233 SBSQ HOSP IP/OBS HIGH 50: CPT | Performed by: NURSE PRACTITIONER

## 2022-04-08 PROCEDURE — 93320 DOPPLER ECHO COMPLETE: CPT

## 2022-04-08 PROCEDURE — 93312 ECHO TRANSESOPHAGEAL: CPT | Performed by: INTERNAL MEDICINE

## 2022-04-08 PROCEDURE — 6360000002 HC RX W HCPCS: Performed by: INTERNAL MEDICINE

## 2022-04-08 PROCEDURE — B24BZZ4 ULTRASONOGRAPHY OF HEART WITH AORTA, TRANSESOPHAGEAL: ICD-10-PCS | Performed by: INTERNAL MEDICINE

## 2022-04-08 PROCEDURE — 93312 ECHO TRANSESOPHAGEAL: CPT

## 2022-04-08 PROCEDURE — 92960 CARDIOVERSION ELECTRIC EXT: CPT | Performed by: INTERNAL MEDICINE

## 2022-04-08 PROCEDURE — 93325 DOPPLER ECHO COLOR FLOW MAPG: CPT

## 2022-04-08 PROCEDURE — 85520 HEPARIN ASSAY: CPT

## 2022-04-08 PROCEDURE — 92960 CARDIOVERSION ELECTRIC EXT: CPT

## 2022-04-08 PROCEDURE — 2580000003 HC RX 258: Performed by: NURSE ANESTHETIST, CERTIFIED REGISTERED

## 2022-04-08 PROCEDURE — 99233 SBSQ HOSP IP/OBS HIGH 50: CPT | Performed by: INTERNAL MEDICINE

## 2022-04-08 PROCEDURE — 83880 ASSAY OF NATRIURETIC PEPTIDE: CPT

## 2022-04-08 RX ORDER — SPIRONOLACTONE 25 MG/1
12.5 TABLET ORAL DAILY
Status: DISCONTINUED | OUTPATIENT
Start: 2022-04-08 | End: 2022-04-08 | Stop reason: HOSPADM

## 2022-04-08 RX ORDER — FUROSEMIDE 40 MG/1
40 TABLET ORAL DAILY
Qty: 30 TABLET | Refills: 1 | Status: SHIPPED | OUTPATIENT
Start: 2022-04-09

## 2022-04-08 RX ORDER — SODIUM CHLORIDE 9 MG/ML
INJECTION, SOLUTION INTRAVENOUS CONTINUOUS PRN
Status: DISCONTINUED | OUTPATIENT
Start: 2022-04-08 | End: 2022-04-08 | Stop reason: SDUPTHER

## 2022-04-08 RX ORDER — AMIODARONE HYDROCHLORIDE 200 MG/1
200 TABLET ORAL DAILY
Status: DISCONTINUED | OUTPATIENT
Start: 2022-04-08 | End: 2022-04-08 | Stop reason: HOSPADM

## 2022-04-08 RX ORDER — GABAPENTIN 100 MG/1
100 CAPSULE ORAL 3 TIMES DAILY
Qty: 21 CAPSULE | Refills: 0 | Status: SHIPPED | OUTPATIENT
Start: 2022-04-08 | End: 2022-05-11 | Stop reason: ALTCHOICE

## 2022-04-08 RX ORDER — AMIODARONE HYDROCHLORIDE 200 MG/1
200 TABLET ORAL DAILY
Qty: 30 TABLET | Refills: 1 | Status: SHIPPED | OUTPATIENT
Start: 2022-04-09 | End: 2022-08-10 | Stop reason: ALTCHOICE

## 2022-04-08 RX ORDER — METOPROLOL SUCCINATE 50 MG/1
50 TABLET, EXTENDED RELEASE ORAL DAILY
Qty: 30 TABLET | Refills: 3 | Status: ON HOLD | OUTPATIENT
Start: 2022-04-09 | End: 2022-06-04 | Stop reason: HOSPADM

## 2022-04-08 RX ORDER — FUROSEMIDE 40 MG/1
40 TABLET ORAL DAILY
Status: DISCONTINUED | OUTPATIENT
Start: 2022-04-08 | End: 2022-04-08 | Stop reason: HOSPADM

## 2022-04-08 RX ORDER — PROPOFOL 10 MG/ML
INJECTION, EMULSION INTRAVENOUS PRN
Status: DISCONTINUED | OUTPATIENT
Start: 2022-04-08 | End: 2022-04-08 | Stop reason: SDUPTHER

## 2022-04-08 RX ORDER — SPIRONOLACTONE 25 MG/1
12.5 TABLET ORAL DAILY
Qty: 30 TABLET | Refills: 3 | Status: ON HOLD | OUTPATIENT
Start: 2022-04-09 | End: 2022-06-04 | Stop reason: HOSPADM

## 2022-04-08 RX ADMIN — SACUBITRIL AND VALSARTAN 1 TABLET: 24; 26 TABLET, FILM COATED ORAL at 08:53

## 2022-04-08 RX ADMIN — PROPOFOL 50 MG: 10 INJECTION, EMULSION INTRAVENOUS at 10:24

## 2022-04-08 RX ADMIN — PROPOFOL 30 MG: 10 INJECTION, EMULSION INTRAVENOUS at 10:26

## 2022-04-08 RX ADMIN — SPIRONOLACTONE 12.5 MG: 25 TABLET ORAL at 08:46

## 2022-04-08 RX ADMIN — AMIODARONE HYDROCHLORIDE 200 MG: 200 TABLET ORAL at 12:24

## 2022-04-08 RX ADMIN — METOPROLOL SUCCINATE 50 MG: 50 TABLET, EXTENDED RELEASE ORAL at 08:44

## 2022-04-08 RX ADMIN — ASPIRIN 81 MG 81 MG: 81 TABLET ORAL at 08:45

## 2022-04-08 RX ADMIN — FUROSEMIDE 40 MG: 40 TABLET ORAL at 08:45

## 2022-04-08 RX ADMIN — PROPOFOL 30 MG: 10 INJECTION, EMULSION INTRAVENOUS at 10:33

## 2022-04-08 RX ADMIN — PROPOFOL 30 MG: 10 INJECTION, EMULSION INTRAVENOUS at 10:29

## 2022-04-08 RX ADMIN — AMIODARONE HYDROCHLORIDE 1 MG/MIN: 50 INJECTION, SOLUTION INTRAVENOUS at 04:46

## 2022-04-08 RX ADMIN — SODIUM CHLORIDE, PRESERVATIVE FREE 10 ML: 5 INJECTION INTRAVENOUS at 09:00

## 2022-04-08 RX ADMIN — APIXABAN 5 MG: 5 TABLET, FILM COATED ORAL at 08:45

## 2022-04-08 RX ADMIN — SODIUM CHLORIDE: 900 INJECTION, SOLUTION INTRAVENOUS at 10:17

## 2022-04-08 ASSESSMENT — PAIN SCALES - GENERAL
PAINLEVEL_OUTOF10: 0

## 2022-04-08 ASSESSMENT — LIFESTYLE VARIABLES: SMOKING_STATUS: 1

## 2022-04-08 NOTE — PLAN OF CARE
Problem: Pain:  Goal: Patient's pain/discomfort is manageable  Description: Patient's pain/discomfort is manageable  Outcome: Ongoing  Note: Pt denies pain, will continue to monitor. Problem: Skin Integrity:  Goal: Skin integrity will stabilize  Description: Skin integrity will stabilize  Outcome: Ongoing  Note: Pt skin remains clean, dry, and intact. Will continue to monitor. Problem: OXYGENATION/RESPIRATORY FUNCTION  Goal: Patient will maintain patent airway  Outcome: Ongoing  Note: Pt oxygen remains WDL, see flowsheet for further details on vital signs through the night. Problem: Falls - Risk of:  Goal: Will remain free from falls  Description: Will remain free from falls  Outcome: Ongoing  Note: Orthostatic vital signs obtained at start of shift - see flowsheet for details. Pt does not meet criteria for orthostasis. Pt is a Med fall risk. See Shirin Eva Fall Score and ABCDS Injury Risk assessments. - Screening for Orthostasis AND not a [place] Risk per CHÁVEZ/ABCDS: Pt bed is in low position, side rails up, call light and belongings are in reach. Fall risk light is on outside pts room. Pt encouraged to call for assistance as needed. Will continue with hourly rounds for PO intake, pain needs, toileting and repositioning as needed. Problem: Bleeding:  Goal: Will show no signs and symptoms of excessive bleeding  Description: Will show no signs and symptoms of excessive bleeding  Outcome: Ongoing  Note: Patient's hemoglobin this AM:   Recent Labs     04/08/22  0355   HGB 14.1     Patient's platelet count this AM:   Recent Labs     04/08/22  0355       Thrombocytopenia not present at this time. Patient showing no signs or symptoms of active bleeding. Transfusion not indicated at this time. Patient verbalizes understanding of all instructions. Will continue to assess and implement POC. Call light within reach and hourly rounding in place.

## 2022-04-08 NOTE — PROGRESS NOTES
S/P SENA and Cardioversion at bedside. Patient received propofol for sedation per CRNA pre procedure. Oral airway removed, patient alert/oriented x4. Sats 98% on room air. Patient denies pain at this time. NSR on monitor. Bed alarm on at this time, patient and family educated on using call light appropriately, verbalized understanding.

## 2022-04-08 NOTE — PROGRESS NOTES
Pt initial pre-op appointment set up with Dr. Rainer Blandon in the office on Wednesday 5/11/22 at 12:30 pm. If he needs to rearrange his appointment he can call the office [phone]. Discussed with patient and his family at bedside. All questions answered.      ERLIN España  4/8/22    3:32 pm

## 2022-04-08 NOTE — PLAN OF CARE
Problem: Safety:  Goal: Free from accidental physical injury  Description: Free from accidental physical injury  Outcome: Ongoing     Orthostatic vital signs obtained at start of shift - see flowsheet for details. Pt does not meet criteria for orthostasis. Pt is a Med fall risk. See Ragena Papa Fall Score and ABCDS Injury Risk assessments. Pt bed is in low position, side rails up, call light and belongings are in reach. Fall risk light is on outside pts room. Pt encouraged to call for assistance as needed. Will continue with hourly rounds for PO intake, pain needs, toileting and repositioning as needed. Problem: Daily Care:  Goal: Daily care needs are met  Description: Daily care needs are met  Outcome: Ongoing     Rounded hourly throughout the day. Pt denies any needs. Problem: Pain:  Goal: Patient's pain/discomfort is manageable  Description: Patient's pain/discomfort is manageable  4/8/2022 1521 by Puneet Silva RN  Outcome: Ongoing     Pt denied any pain throughout the day. Problem: Skin Integrity:  Goal: Skin integrity will stabilize  Description: Skin integrity will stabilize  4/8/2022 1521 by Puneet Silva RN  Outcome: Ongoing    No skin integrity problems noted. Will continue to monitor. Problem: OXYGENATION/RESPIRATORY FUNCTION  Goal: Patient will maintain patent airway  4/8/2022 1521 by Puneet Silva RN  Outcome: Ongoing     Pt did not experience any problems with his airway throughout the day. Will continue to monitor. Problem: HEMODYNAMIC STATUS  Goal: Patient has stable vital signs and fluid balance  Outcome: Ongoing     Vital signs remained stable throughout the day. See flowsheet for details. Problem: ACTIVITY INTOLERANCE/IMPAIRED MOBILITY  Goal: Mobility/activity is maintained at optimum level for patient  Outcome: Ongoing    Pt UAL and ortho neg. Pt was up to the restroom and sat on the side of the bed at various times throughout the day.        Problem: Bleeding:  Goal: Will show no signs and symptoms of excessive bleeding  Description: Will show no signs and symptoms of excessive bleeding  4/8/2022 1521 by Wilma Liz RN  Outcome: Ongoing       Patient's hemoglobin this AM: Recent Labs     04/08/22  0355   HGB 14.1     Patient's platelet count this AM:   Recent Labs     04/08/22  0355       Thrombocytopenia not present at this time. Patient showing no signs or symptoms of active bleeding. Transfusion not indicated at this time. Patient verbalizes understanding of all instructions. Will continue to assess and implement POC. Call light within reach and hourly rounding in place.

## 2022-04-08 NOTE — ANESTHESIA POSTPROCEDURE EVALUATION
Department of Anesthesiology  Postprocedure Note    Patient: Rekha Joseph  MRN: [id number]  YOB: 1960  Date of evaluation: 4/8/2022  Time:  12:36 PM     Procedure Summary     Date: 04/08/22 Room / Location: [place] [address] Cath Lab    Anesthesia Start: 3151 Anesthesia Stop: 4427    Procedures:       SCA      Procedure Not Yet Scheduled Diagnosis:     Scheduled Providers:  Responsible Provider: Vish Phillips DO    Anesthesia Type: MAC ASA Status: 4          Anesthesia Type: MAC    Anthony Phase I:      Anthony Phase II:      Last vitals: Reviewed and per EMR flowsheets.        Anesthesia Post Evaluation    Patient location during evaluation: PACU  Patient participation: complete - patient participated  Level of consciousness: awake and alert  Pain score: 0  Airway patency: patent  Nausea & Vomiting: no nausea and no vomiting  Complications: no  Cardiovascular status: hemodynamically stable  Respiratory status: acceptable  Hydration status: stable

## 2022-04-08 NOTE — PROGRESS NOTES
non-tender, non-distended with normal bowel sounds. Musculoskelatal: No clubbing, cyanosis or edema bilaterally. Skin: Skin color, texture, turgor normal.  No rashes or lesions. Neurologic:  Cranial nerves: II-XII intact, grossly non-focal.  Psychiatric: Alert and oriented, thought content appropriate, normal insight    Labs:   Recent Labs     04/06/22  0322 04/07/22  0434 04/08/22  0355   WBC 9.5 11.5* 11.6*   HGB 14.0 13.5 14.1   HCT 42.3 41.2 41.8    281 272     Recent Labs     04/06/22  0322 04/07/22  0434    138   K 4.0 4.1    103   CO2 22 23   BUN 19 21*   CREATININE 1.1 0.9   CALCIUM 9.8 9.4     No results for input(s): AST, ALT, BILIDIR, BILITOT, ALKPHOS in the last 72 hours. No results for input(s): INR in the last 72 hours. No results for input(s): [place] in the last 72 hours. Studies:  CTA PULMONARY W CONTRAST   Final Result      1. No evidence of pulmonary embolus. 2. Small bilateral pleural effusions with basilar interstitial edema and dependent passive subsegmental atelectasis      XR CHEST (2 VW)   Final Result   Coarse interstitial markings throughout both lungs likely    underlying emphysema with patchy and hazy increased densities in    both lung bases, mostly on the right suspicious for pneumonia    versus CHF.             TTE 04/05/22     Conclusions        Summary    Normal left ventricle size with mild concentric left ventricular  hypertrophy.    Overall left ventricular systolic function appears mildly reduced with an  ejection fraction of 40-45%.    Mild global hypokinesis.    Diastolic filling parameters suggest grade I diastolic dysfunction.    Mild mitral regurgitation    Mild aortic regurgitation.    Moderate aortic stenosis with a peak velocity of 3.75m/s and a mean pressure    gradient of 34 mmHg.    The right atrium is dilated.         Assessment/Plan:  Active Hospital Problems    Diagnosis Date Noted    [facility] [J96.01]     HF (heart failure) ([facility] [I50.9] 04/02/2022    Acute heart failure ([facility] [I50.9] 04/02/2022     Dyspnea, possible acute diastolic heart failure: POA  Differential: symptomatic aortic stenosis, symptomatic A. Fib  Echo in 2018 with milld-moderate aortic stenosis, LVH, R atrium mildly dilated, EF 55%. CXR as above \"suspicious for PNA vs CHF\"  Troponin negative x 3, EKG not ischemic  CTA - No PE, no PNA, small b/l pleural effusions with basilar interstitial edema  Started on heparin drip per Cardiology rec  ASA, statin  Treated with IV lasix, held for now  BPs back down to normal range with nitropaste, not on any antihypertensives at home  Telemetry  Echo: Per cardiology rec,  if there is evidence of systolic heart failure or severe aortic stenosis will plan with for coronary angiography otherwise plan for stress test.  Cardiology following, [facility] with Mild to moderate CAD in the right coronary artery proximally. Fibrocalcific aortic valve stenosis with a gradient measured of 25 mm  Aortic valve insufficiency which is mild to moderate  CTS consulted and plan for surgical option of AVR and CABG x 1 vs TAVR/Stent which will discussed as outpatient     P-Afib with RVR, new diagnosis  Reverted to NSR but now back in Afib  TSH wnl  Body mass index is 39.79 kg/m². , will benefit from DAYNA work up as outpatient  Last echo 06/2018 - EF 55%  New echo 04/05/22 with depressed EF 40-45%  Change to apixaban  EP consulted, new onset Afib so may benefit from ablation   Monitor on tele  Plan for SENA cardioversion  Continue amiodarone    PreDMII - Carb control diet  Last A1c 6.1    HTN - Noted to have this in 2018 but not on any meds at home currently    DVT Prophylaxis: Heparin gtt  Diet: Diet NPO Exceptions are: Sips of Water with Meds  Code Status: Full Code    PT/OT Eval Status:     Dispo - Inpatient, monitor on tele  Likely dc over the weekend    Fazal Welch MD  Internal Medicine Hospitalist

## 2022-04-08 NOTE — CONSULTS
[facility]  Cardiology Inpatient Consult Service  Daily Progress Note        Admit Date:  4/2/2022    Referring Physician: Antonino Mcgovern MD    Reason for Consultation/Chief Complaint:   Acute systolic heart failure/A. Fib    Subjective: Interval history:  ASIA  Good urine output    Medications:   metoprolol succinate  50 mg Oral Daily    apixaban  5 mg Oral BID    sacubitril-valsartan  1 tablet Oral BID    pantoprazole  40 mg Oral QAM AC    sodium chloride flush  5-40 mL IntraVENous 2 times per day    aspirin  81 mg Oral Daily    atorvastatin  40 mg Oral Nightly       IV drips:   amiodarone 450mg/250ml D5W infusion 1 mg/min (04/08/22 0446)    sodium chloride         PRN:  heparin (porcine), heparin (porcine), sodium chloride flush, sodium chloride, ondansetron **OR** ondansetron, polyethylene glycol, acetaminophen **OR** acetaminophen      Objective:     Vitals:    04/07/22 2024 04/07/22 2355 04/08/22 0304 04/08/22 0308   BP: 128/71 96/61 (!) 92/54 107/68   Pulse: 109 97 97    Resp: 14 16 20    Temp: 97.8 °F (36.6 °C) 98.5 °F (36.9 °C) 97.4 °F (36.3 °C)    TempSrc: Oral Oral Temporal    SpO2: 93% 97% 98%    Weight:       Height:           Intake/Output Summary (Last 24 hours) at 4/8/2022 0803  Last data filed at 4/8/2022 0446  Gross per 24 hour   Intake 1251 ml   Output 3375 ml   Net -2124 ml     I/O last 3 completed shifts: In: 1660.2 [P.O.:840;  I.V.:820.2]  Out: 4850 [Urine:4850]  Wt Readings from Last 3 Encounters:   04/07/22 (!) 309 lb 14.4 oz (140.6 kg)   12/18/18 265 lb (120.2 kg)   06/26/18 257 lb 8 oz (116.8 kg)       Admit Wt: Weight: (!) 321 lb 1.6 oz (145.7 kg)   Todays Wt: Weight: (!) 309 lb 14.4 oz (140.6 kg)    TELEMETRY: Personally interpreted Atrial fibrillation     Physical Exam:     General:  Awake, alert, NAD  Skin:  Warm and dry  Neck:  -JVP  Chest:  Clear to auscultation, respiration normal  Cardiovascular:  RRR S1S2  Abdomen:  Soft -  Extremities: Mild BL  edema      Objective:  Vital signs: (most recent): Blood pressure 107/68, pulse 97, temperature 97.4 °F (36.3 °C), temperature source Temporal, resp. rate 20, height 6' 2\" (1.88 m), weight (!) 309 lb 14.4 oz (140.6 kg), SpO2 98 %. Labs:   Recent Labs     04/06/22  0322 04/07/22  0434    138   K 4.0 4.1   BUN 19 21*   CREATININE 1.1 0.9    103   CO2 22 23   GLUCOSE 101* 103*   CALCIUM 9.8 9.4   MG 2.30 2.20     Recent Labs     04/06/22  0322 04/06/22  0322 04/07/22  0434 04/07/22  0434 04/08/22  0355   WBC 9.5  --  11.5*  --  11.6*   HGB 14.0  --  13.5  --  14.1   HCT 42.3  --  41.2  --  41.8     --  281  --  272   MCV 87.8   < > 87.1   < > 86.5    < > = values in this interval not displayed. No results for input(s): CHOLTOT, TRIG, HDL, LDL in the last 72 hours. Invalid input(s): LIPIDCOMM, CHOLHDL, VLDCHOL  No results for input(s): PTT, INR in the last 72 hours. Invalid input(s): PT  No results for input(s): CKTOTAL, CKMB, CKMBINDEX, TROPONINI in the last 72 hours. No results for input(s): BNP in the last 72 hours. No results for input(s): NTPROBNP in the last 72 hours. No results for input(s): TSH in the last 72 hours. Imaging:   I personally reviewed imaging studies including CXR, Stress test, TTE/SENA. Normal left ventricle size with mild concentric left ventricular   hypertrophy. Overall left ventricular systolic function appears mildly reduced with an   ejection fraction of 40-45%. Mild global hypokinesis. Diastolic filling parameters suggest grade I diastolic dysfunction. Mild mitral regurgitation   Mild aortic regurgitation. Moderate aortic stenosis with a peak velocity of 3.75m/s and a mean pressure   gradient of 34 mmHg. The right atrium is dilated. Assessment & Plan:     Acute systolic heart failure/A.  Fib/Mod AS  -New diagnosis of heart failure with mildly reduced ejection fraction (by latest guidelines he will benefit from SGLT2 inhibitors first along with ARNi)  -Plan for GUALBERTO cardioversion today.  -Continue metoprolol 50. Continue Eliquis  -Continue low-dose Entresto  -add jardiance 10 mg  -Lasix 40mg po daily, add aldactone  -BNP on discharge   -Continue medical therapy, reassess LV function and degree of valvular disease in a few months. Discussed with CT surgery no need for immediate surgery at this point. Thank you for allowing to us to participate in the care of Dano Pike. Please call our service with questions. All questions and concerns were addressed to the patient/family. Alternatives to my treatment were discussed. The note was completed using EMR. Every effort was made to ensure accuracy; however, inadvertent computerized transcription errors may be present. I have personally reviewed the reports and images of labs, radiological studies, cardiac studies including ECG's and telemetry, current and old medical records. Francisco R. Mortimer Diamond MD, [facility] [place], [facility] [address]    4/8/2022 8:03 AM
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
## 2022-04-08 NOTE — PROGRESS NOTES
Electrophysiology - PROGRESS NOTE    Admit Date: 4/2/2022     Chief Complaint: AF     Interval History:   Patient seen and examined and notes reviewed. Patient is being followed for AF. Patient p/w HF sx to hospital. Noted to be in AF/RVR. Echo showed newly reduced EF 40-45%, global hypokinesis, mod AS. Placed on amio gtt & Eliquis. Reamins in AF, rates 's overnight, plan for SENA/DCCV today with Dr. Regina Leonard. BLE edema resolved, SOB improved. No CP, palps, dizziness. Does endorse fatigue. In: 8497 [P.O.:840;  I.V.:411]  Out: 3675    Wt Readings from Last 2 Encounters:   04/08/22 (!) 305 lb 14.4 oz (138.8 kg)   12/18/18 265 lb (120.2 kg)         Data:   Scheduled Meds:   Scheduled Meds:   furosemide  40 mg Oral Daily    spironolactone  12.5 mg Oral Daily    metoprolol succinate  50 mg Oral Daily    apixaban  5 mg Oral BID    sacubitril-valsartan  1 tablet Oral BID    pantoprazole  40 mg Oral QAM AC    sodium chloride flush  5-40 mL IntraVENous 2 times per day    aspirin  81 mg Oral Daily    atorvastatin  40 mg Oral Nightly     Continuous Infusions:   amiodarone 450mg/250ml D5W infusion 1 mg/min (04/08/22 0446)    sodium chloride       PRN Meds:.heparin (porcine), heparin (porcine), sodium chloride flush, sodium chloride, ondansetron **OR** ondansetron, polyethylene glycol, acetaminophen **OR** acetaminophen  Continuous Infusions:   amiodarone 450mg/250ml D5W infusion 1 mg/min (04/08/22 0446)    sodium chloride         Intake/Output Summary (Last 24 hours) at 4/8/2022 0845  Last data filed at 4/8/2022 0836  Gross per 24 hour   Intake 1251 ml   Output 3675 ml   Net -2424 ml       CBC:   Lab Results   Component Value Date    WBC 11.6 04/08/2022    HGB 14.1 04/08/2022     04/08/2022     BMP:  Lab Results   Component Value Date     04/07/2022    K 4.1 04/07/2022    K 4.2 04/02/2022     04/07/2022    CO2 23 04/07/2022    BUN 21 04/07/2022    CREATININE 0.9 04/07/2022    GLUCOSE 103 04/07/2022     INR:   Lab Results   Component Value Date    INR 1.00 04/03/2022    INR 0.85 06/25/2018        CARDIAC LABS  ENZYMES:No results for input(s): CKMB, CKMBINDEX, TROPONINI in the last 72 hours. Invalid input(s): CKTOTAL;3  FASTING LIPID PANEL:  Lab Results   Component Value Date    HDL 49 04/03/2022    LDLCALC 65 04/03/2022    TRIG 75 04/03/2022    TSH 1.41 06/25/2018     LIVER PROFILE:  Lab Results   Component Value Date    AST 25 04/02/2022    AST 13 06/25/2018    ALT 27 04/02/2022    ALT 14 06/25/2018       -----------------------------------------------------------------  Telemetry: Personally reviewed  AF, rates intermittently elevated 100-120's    Objective:   Vitals: /69   Pulse 104   Temp 97.4 °F (36.3 °C) (Temporal)   Resp 20   Ht 6' 2\" (1.88 m)   Wt (!) 305 lb 14.4 oz (138.8 kg)   SpO2 96%   BMI 39.28 kg/m²   General appearance: alert, appears stated age and cooperative, No acute distress   Skin: Skin color, texture, turgor normal. No rashes or ecchymosis. Neck: no JVD, supple, symmetrical, trachea midline   Lungs: , no accessory muscle use, no respiratory distress  Heart: irregularly irregular, tachy at times  Extremities: No edema, DP +  Psychiatric: normal insight and affect    Patient Active Problem List:     Atypical chest pain     HTN (hypertension)     HLD (hyperlipidemia)     Unstable angina (HCC)     Smoker     HF (heart failure) (HCC)     Acute heart failure (HCC)     Acute respiratory failure with hypoxia (HCC)        Assessment & Plan:    1. pAF  2. Acute sCHF  3.  Mod AS    Pati Torre Is a 60 y.o. man w/ PMH HTN, HLD, who p/w worsening orthopnea, PND, BLE edema, noted to be in AF, Echo showed newly reduce EF 40-45%, mod AS    pAF  - In AF, rates 100-120's  - CHADSVASc at least 2  - On Eliquis 5 mg BID, no bleeding  - Plan for SENA/ DCCV today w/ Dr. Keegan Hdz  - On amio gtt, transition to po amio after DCCV today  - TSH/LFT's (4/2022)  - On Toprol 50 mg QD  - Reviewed risk factor modification for AF with patient including HTN/ DM control, DAYNA management, stress reduction, minimal alcohol intake, tobacco cessation, regular exercise, diet    Acute HFrEF/CMP  - Improved  - Echo showed EF 40-45%, mild global hypokinesis  - On Entresto 24-26 mg BID, Toprol 50 mg QD, lasix 40 mg QD, spironolactone 12.5 mg QD  - Kep K >4.0, Mg >2.0  - Daily weights  - Strict I/o's  - Neg 9.17L since admission  - Dr. Jennifer Matta      Electronically signed by RENETTA Chambers - CNP on 4/8/22 at 8:45 AM EDSHANIQUE [employer]

## 2022-04-08 NOTE — PROCEDURES
SENA CARDIOVERSION    PROCEDURE PERFORMED: SENA guided external electrical cardioversion. : Dr. Selena Card:  EP team    COMPLICATIONS:  none    ESTIMATED BLOOD LOSS:  none    OTHER MEDICATIONS:  None    HISTORY:      PROCEDURE IN DETAIL:  The patient was in stable condition. The patient was in a fasting, nonsedated state. He was hemodynamically stable. The risks, benefits and alternatives of both the transesophageal echocardiography and external electrical cardioversion were discussed with the patient in detail. The risks associated with the SENA, including but not limited to, the risks of oropharyngeal and esophageal injury, in addition to allergic reactions to the topical and systemic anesthetics used were all discussed with the patient. In regards to the external electrical cardioversion, the risks of asystole, stroke and skin burns were discussed with the patient. After considering the risks and benefits of both procedures, the patient opted to proceed with the SENA guided external electrical cardioversion. Written informed consent was obtained and placed on the chart. At this point, a timeout protocol was completed to identify the patient and the procedure being performed. The patient was attached to continuous electrocardiographic monitoring with noninvasive blood pressure monitoring as well. Anterior and posterior chest defibrillation pads were attached in the typical position. The patient underwent the SENA portion of the procedure, which is reported separately. Briefly, the patient had the SENA probe advanced into position without difficulty. He tolerated the procedure well. Multiplane imaging of the cardiac chambers were obtained and showed no evidence of intracardiac thrombus or any other contraindication to proceed with external electrical cardioversion. The probe was removed without difficulty and we proceeded to prepare for the external electrical cardioversion.   The SENA portion was done under moderate sedation. For the external electrical cardioversion, the patient was attached to an external defibrillator which was set to the synchronized mode. Once deep sedation was assured, a 200 joule synchronized external biphasic shock was delivered to the patient, which successfully converted him from atrial fibrillation to normal sinus rhythm. The patient maintained adequate oxygenation throughout the procedure. He remained hemodynamically stable. Within a few minutes of the successful cardioversion, the patient started to wake up and had no evidence of any neurologic sequelae. By 10 minutes after cardioversion, the patient was mostly awake and had no complaints. He was responsive, but somnolent. He was taken to the recovery room until he is fully awake and back to his baseline mental status. SUMMARY:    1. Successful SENA guided external electrical cardioversion. RECOMMENDATIONS:    1. Bed rest x2 hours. 2. Continue telemetry monitoring while in the hospital.  3. Follow up in the electrophysiology clinic in three to four weeks. Denny Sanders MD, [facility] [place], [facility]

## 2022-04-08 NOTE — DISCHARGE SUMMARY
Hospital Medicine Discharge Summary    Patient ID: Mikayla Amador      Patient's PCP: No primary care provider on file. Admit Date: 4/2/2022     Discharge Date:  04/08/22    Admitting Physician: Alexandra Neri DO     Discharge Physician: James Jarquin MD     Discharge Diagnoses: Active Hospital Problems    Diagnosis Date Noted    New onset atrial fibrillation (HCC) [I48.91]     Moderate aortic stenosis [I35.0]     Acute respiratory failure with hypoxia ([address] [J96.01]     HF (heart failure) ([address] [I50.9] 04/02/2022    Acute heart failure ([address] [I50.9] 04/02/2022       The patient was seen and examined on day of discharge and this discharge summary is in conjunction with any daily progress note from day of discharge. Hospital Course: Detailed PN from dc date  Stable after SENA cardioversion   Meds to beds done  Will follow up with cardiology next week on 11th in the office  Stable for discharge    Physical Exam Performed:     /65   Pulse 88   Temp 97.4 °F (36.3 °C) (Temporal)   Resp 13   Ht 6' 2\" (1.88 m)   Wt (!) 305 lb 14.4 oz (138.8 kg)   SpO2 96%   BMI 39.28 kg/m²   See PN from dc date  Labs: For convenience and continuity at follow-up the following most recent labs are provided:      CBC:    Lab Results   Component Value Date    WBC 11.6 04/08/2022    HGB 14.1 04/08/2022    HCT 41.8 04/08/2022     04/08/2022       Renal:    Lab Results   Component Value Date     04/08/2022    K 4.5 04/08/2022    K 4.2 04/02/2022     04/08/2022    CO2 22 04/08/2022    BUN 17 04/08/2022    CREATININE 1.1 04/08/2022    CALCIUM 9.6 04/08/2022         Significant Diagnostic Studies    Radiology:   CTA PULMONARY W CONTRAST   Final Result      1. No evidence of pulmonary embolus.      2. Small bilateral pleural effusions with basilar interstitial edema and dependent passive subsegmental atelectasis      XR CHEST (2 VW)   Final Result   Coarse interstitial markings throughout both lungs likely    underlying emphysema with patchy and hazy increased densities in    both lung bases, mostly on the right suspicious for pneumonia    versus CHF. Consults:     IP CONSULT TO HEART FAILURE NURSE/COORDINATOR  IP CONSULT TO DIETITIAN  IP CONSULT TO CARDIOLOGY  IP CONSULT TO CARDIOLOGY  IP CONSULT TO CARDIOTHORACIC SURGERY    Disposition:  Home     Condition at Discharge: Stable    Discharge Instructions/Follow-up:    Follow up with cardiology  Follow up with CTS    Code Status:  Full Code    Activity: activity as tolerated    Diet: Low sodium diet     Discharge Medications:     Current Discharge Medication List           Details   amiodarone (CORDARONE) 200 MG tablet Take 1 tablet by mouth daily  Qty: 30 tablet, Refills: 1      apixaban (ELIQUIS) 5 MG TABS tablet Take 1 tablet by mouth 2 times daily  Qty: 60 tablet, Refills: 1      furosemide (LASIX) 40 MG tablet Take 1 tablet by mouth daily  Qty: 30 tablet, Refills: 1      metoprolol succinate (TOPROL XL) 50 MG extended release tablet Take 1 tablet by mouth daily  Qty: 30 tablet, Refills: 3      sacubitril-valsartan (ENTRESTO) 24-26 MG per tablet Take 1 tablet by mouth 2 times daily  Qty: 60 tablet, Refills: 1      spironolactone (ALDACTONE) 25 MG tablet Take 0.5 tablets by mouth daily  Qty: 30 tablet, Refills: 3              Details   gabapentin (NEURONTIN) 100 MG capsule Take 1 capsule by mouth 3 times daily for 7 days. Qty: 21 capsule, Refills: 0              Details   pantoprazole (PROTONIX) 40 MG tablet Take 40 mg by mouth every morning (before breakfast)      atorvastatin (LIPITOR) 40 MG tablet Take 40 mg by mouth daily      Multiple Vitamins-Minerals (THERAPEUTIC MULTIVITAMIN-MINERALS) tablet Take 1 tablet by mouth daily             Time Spent on discharge is more than 30 minutes in the examination, evaluation, counseling and review of medications and discharge plan.       Signed:    Bharathi Rincon MD   4/8/2022      Thank you No primary care provider on file. for the opportunity to be involved in this patient's care. If you have any questions or concerns please feel free to contact me at [phone].
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
## 2022-04-11 ENCOUNTER — CARE COORDINATION (OUTPATIENT)
Dept: CASE MANAGEMENT | Age: 62
End: 2022-04-11

## 2022-04-11 ENCOUNTER — FOLLOWUP TELEPHONE ENCOUNTER (OUTPATIENT)
Dept: INPATIENT UNIT | Age: 62
End: 2022-04-11

## 2022-04-11 ENCOUNTER — TELEPHONE (OUTPATIENT)
Dept: CARDIOLOGY CLINIC | Age: 62
End: 2022-04-11

## 2022-04-11 NOTE — TELEPHONE ENCOUNTER
Per Dr. Junior Lunch, can you schedule pt to see Dr. Nathalia Carmona for TAVR consult?  You can offer 5/5/22 at 11:30am. [facility] RN

## 2022-04-11 NOTE — CARE COORDINATION
Care Transitions Outreach Attempt    Call within 2 business days of discharge: Yes   Attempted to reach patient for 1st attempt at 24 hr transitions of care follow up. Unable to reach patient. Left HIPPA Compliant VM. Yehuda Hyde LPN, [facility] 30: [phone]      Patient: Felicia Terry Patient : 1960 MRN: [id number]    Last Discharge [facility]       Complaint Diagnosis Description Type Department Provider    22 Shortness of Breath; Chest Pain Acute respiratory failure with hypoxia (HCC) . .. ED to Hosp-Admission (Discharged) (ADMIT) Laurence Leon MD; Tripp Malloy. .. Was this an external facility discharge?  No Discharge Facility: [facility]    Noted following upcoming appointments from discharge chart review:   [facility] follow up appointment(s):   Future Appointments   Date Time Provider Cain Mijares   2022 10:00 AM Darylene Molt, APRN - CNP Ralph Card [facility]   2022  2:00 PM MD Mayo Thomas [facility]   2022 12:30 PM MD MESSI Trent S [facility]     Non-[facility] follow up appointment(s):

## 2022-04-12 ENCOUNTER — CARE COORDINATION (OUTPATIENT)
Dept: CASE MANAGEMENT | Age: 62
End: 2022-04-12

## 2022-04-12 NOTE — CARE COORDINATION
[facility] Transitions Initial Follow Up Call    Call within 2 business days of discharge: Yes    Patient: Cha Davison Patient : [id number]   MRN: <[id number]>  Reason for Admission: SOB,Chest pain  Discharge Date: 22 RARS: Readmission Risk Score: 7.8 ( )      Last Discharge [facility]       Complaint Diagnosis Description Type Department Provider    22 Shortness of Breath; Chest Pain Acute respiratory failure with hypoxia (HCC) . .. ED to Hosp-Admission (Discharged) (ADMIT) Ni Wong MD; Carlos Hester... Spoke with: Doreen Darling  Patient reports he is doing good. Appetite and fluid intake is good. No elimination problems of bladder or bowel. Patient denies cough,sob, cp, swelling, fatigue, weakness, fever, palpitations or chills. Patient has follow up scheduled. He has medications and taking as prescribed. Medication review done. [facility]. No further outreach scheduled. Transitions of Care Initial Call    Was this an external facility discharge? No Discharge Facility: [facility]    Challenges to be reviewed by the provider   Additional needs identified to be addressed with provider: No  none             Method of communication with provider : none      Advance Care Planning:   Does patient have an Advance Directive: unable to view. Was this a readmission? No  Patient stated reason for admission: SOB  Patients top risk factors for readmission: depression  ineffective coping  medical condition-CHF, HTN, Afib, CVD    Care Transition Nurse (CTN) contacted the patient by telephone to perform post hospital discharge assessment. Verified name and  with patient as identifiers. Provided introduction to self, and explanation of the CTN role. CTN reviewed discharge instructions, medical action plan and red flags with patient who verbalized understanding. Patient given an opportunity to ask questions and does not have any further questions or concerns at this time.  Were discharge instructions available to patient? Yes. Reviewed appropriate site of care based on symptoms and resources available to patient including: PCP  Specialist  When to call 911. The patient agrees to contact the PCP office for questions related to their healthcare. Medication reconciliation was performed with family, who verbalizes understanding of administration of home medications. Advised obtaining a 90-day supply of all daily and as-needed medications. Covid Risk Education     Educated patient about risk for severe COVID-19 due to risk factors according to CDC guidelines. LPN CC reviewed discharge instructions, medical action plan and red flag symptoms with the patient who verbalized understanding. Discussed COVID vaccination status: Yes. Education provided on COVID-19 vaccination as appropriate. Discussed exposure protocols and quarantine with CDC Guidelines. Patient was given an opportunity to verbalize any questions and concerns and agrees to contact LPN CC or health care provider for questions related to their healthcare. Reviewed and educated family on any new and changed medications related to discharge diagnosis. Was patient discharged with a pulse oximeter? No Discussed and confirmed pulse oximeter discharge instructions and when to notify provider or seek emergency care. LPN CC provided contact information.  No further follow-up call indicated based on severity of symptoms and risk factor    Care Transitions 24 Hour Call    Care Transitions Interventions         Follow Up  Future Appointments   Date Time Provider Cain Mijares   4/13/2022 10:00 AM RENETTA Ewing - CNP [place] [facility]   5/9/2022  2:00 PM MD Mayo Baker [facility]   5/11/2022 12:30 PM MD Elva Ariza [address] VIRIDIANA Yeung LPN

## 2022-04-12 NOTE — PROGRESS NOTES
Attempted to reach patient for 72 hour HF hospital follow up. Calls made at 1500 and 1600. Both calls went to . Message left requesting call back. CTN also attempted call this date and also left VM. This will serve as three attempts to reach patient for 72 hour follow up. Pt has follow up arranged for Wed 4/13 with cardiology. [place] [facility] also will be in contact with patient for TAVR appt per chart.

## 2022-04-13 ENCOUNTER — OFFICE VISIT (OUTPATIENT)
Dept: CARDIOLOGY CLINIC | Age: 62
End: 2022-04-13
Payer: MEDICARE

## 2022-04-13 VITALS
WEIGHT: 306 LBS | SYSTOLIC BLOOD PRESSURE: 118 MMHG | DIASTOLIC BLOOD PRESSURE: 82 MMHG | HEIGHT: 74 IN | OXYGEN SATURATION: 97 % | BODY MASS INDEX: 39.27 KG/M2 | HEART RATE: 82 BPM

## 2022-04-13 DIAGNOSIS — I35.0 MODERATE AORTIC STENOSIS: Primary | ICD-10-CM

## 2022-04-13 DIAGNOSIS — E78.2 MIXED HYPERLIPIDEMIA: ICD-10-CM

## 2022-04-13 DIAGNOSIS — I48.91 NEW ONSET ATRIAL FIBRILLATION (HCC): ICD-10-CM

## 2022-04-13 DIAGNOSIS — I10 PRIMARY HYPERTENSION: ICD-10-CM

## 2022-04-13 DIAGNOSIS — I50.20 HFREF (HEART FAILURE WITH REDUCED EJECTION FRACTION) (HCC): ICD-10-CM

## 2022-04-13 PROCEDURE — 1036F TOBACCO NON-USER: CPT | Performed by: NURSE PRACTITIONER

## 2022-04-13 PROCEDURE — 99495 TRANSJ CARE MGMT MOD F2F 14D: CPT | Performed by: NURSE PRACTITIONER

## 2022-04-13 PROCEDURE — 1111F DSCHRG MED/CURRENT MED MERGE: CPT | Performed by: NURSE PRACTITIONER

## 2022-04-13 PROCEDURE — 3017F COLORECTAL CA SCREEN DOC REV: CPT | Performed by: NURSE PRACTITIONER

## 2022-04-13 PROCEDURE — G8427 DOCREV CUR MEDS BY ELIG CLIN: HCPCS | Performed by: NURSE PRACTITIONER

## 2022-04-13 PROCEDURE — G8417 CALC BMI ABV UP PARAM F/U: HCPCS | Performed by: NURSE PRACTITIONER
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
## 2022-05-05 ENCOUNTER — OFFICE VISIT (OUTPATIENT)
Dept: CARDIOLOGY CLINIC | Age: 62
End: 2022-05-05
Payer: MEDICARE

## 2022-05-05 VITALS
SYSTOLIC BLOOD PRESSURE: 138 MMHG | OXYGEN SATURATION: 98 % | HEIGHT: 74 IN | HEART RATE: 67 BPM | BODY MASS INDEX: 40.37 KG/M2 | WEIGHT: 314.6 LBS | DIASTOLIC BLOOD PRESSURE: 80 MMHG

## 2022-05-05 DIAGNOSIS — I35.0 NONRHEUMATIC AORTIC VALVE STENOSIS: Primary | ICD-10-CM

## 2022-05-05 DIAGNOSIS — I10 ESSENTIAL HYPERTENSION: ICD-10-CM

## 2022-05-05 DIAGNOSIS — I48.0 PAROXYSMAL ATRIAL FIBRILLATION (HCC): ICD-10-CM

## 2022-05-05 PROCEDURE — 1111F DSCHRG MED/CURRENT MED MERGE: CPT | Performed by: INTERNAL MEDICINE

## 2022-05-05 PROCEDURE — G8427 DOCREV CUR MEDS BY ELIG CLIN: HCPCS | Performed by: INTERNAL MEDICINE

## 2022-05-05 PROCEDURE — 3017F COLORECTAL CA SCREEN DOC REV: CPT | Performed by: INTERNAL MEDICINE

## 2022-05-05 PROCEDURE — 99214 OFFICE O/P EST MOD 30 MIN: CPT | Performed by: INTERNAL MEDICINE

## 2022-05-05 PROCEDURE — G8417 CALC BMI ABV UP PARAM F/U: HCPCS | Performed by: INTERNAL MEDICINE

## 2022-05-05 PROCEDURE — 1036F TOBACCO NON-USER: CPT | Performed by: INTERNAL MEDICINE

## 2022-05-11 ENCOUNTER — OFFICE VISIT (OUTPATIENT)
Dept: CARDIOTHORACIC SURGERY | Age: 62
End: 2022-05-11
Payer: MEDICARE

## 2022-05-11 VITALS
TEMPERATURE: 98.7 F | HEIGHT: 74 IN | SYSTOLIC BLOOD PRESSURE: 112 MMHG | WEIGHT: 314 LBS | HEART RATE: 100 BPM | BODY MASS INDEX: 40.3 KG/M2 | DIASTOLIC BLOOD PRESSURE: 70 MMHG | OXYGEN SATURATION: 96 %

## 2022-05-11 DIAGNOSIS — R09.89 BRUIT: ICD-10-CM

## 2022-05-11 DIAGNOSIS — I35.0 AORTIC STENOSIS, SEVERE: ICD-10-CM

## 2022-05-11 DIAGNOSIS — Z01.818 PRE-OP TESTING: Primary | ICD-10-CM

## 2022-05-11 PROCEDURE — 99213 OFFICE O/P EST LOW 20 MIN: CPT | Performed by: THORACIC SURGERY (CARDIOTHORACIC VASCULAR SURGERY)

## 2022-05-11 PROCEDURE — 3017F COLORECTAL CA SCREEN DOC REV: CPT | Performed by: THORACIC SURGERY (CARDIOTHORACIC VASCULAR SURGERY)

## 2022-05-11 PROCEDURE — G8417 CALC BMI ABV UP PARAM F/U: HCPCS | Performed by: THORACIC SURGERY (CARDIOTHORACIC VASCULAR SURGERY)

## 2022-05-11 PROCEDURE — 1036F TOBACCO NON-USER: CPT | Performed by: THORACIC SURGERY (CARDIOTHORACIC VASCULAR SURGERY)

## 2022-05-11 PROCEDURE — G8427 DOCREV CUR MEDS BY ELIG CLIN: HCPCS | Performed by: THORACIC SURGERY (CARDIOTHORACIC VASCULAR SURGERY)

## 2022-05-11 RX ORDER — GABAPENTIN 300 MG/1
300 CAPSULE ORAL DAILY
Status: ON HOLD | COMMUNITY
End: 2022-06-04 | Stop reason: HOSPADM

## 2022-05-12 ENCOUNTER — HOSPITAL ENCOUNTER (OUTPATIENT)
Dept: VASCULAR LAB | Age: 62
Discharge: HOME | End: 2022-05-12
Payer: MEDICARE

## 2022-05-12 DIAGNOSIS — R09.89 BRUIT: ICD-10-CM

## 2022-05-12 DIAGNOSIS — Z01.818 PRE-OP TESTING: ICD-10-CM

## 2022-05-12 PROCEDURE — 93971 EXTREMITY STUDY: CPT

## 2022-05-12 PROCEDURE — 93880 EXTRACRANIAL BILAT STUDY: CPT

## 2022-05-12 NOTE — PROGRESS NOTES
Jose Cruz Lucero returns for ongoing discussion related to his aortic valve stenosis. Since discharge from the hospital he has been getting along normally. He is no longer experience any shortness of breath and feels much improved over how he was when I first saw him in the hospital.    We talked at length about his aortic valve stenosis and both the transcatheter and surgical options for fixing this. At the end of her discussion he was comfortable with pursuing a surgical option primary because the anticipated durability of the prosthesis can be implanted. All of his and his daughter's questions were answered. They both understand also that his single-vessel coronary bypass procedure to his right coronary artery to be done at the same time. My office will move forward getting him on the schedule to have his procedure done. He and his daughter know they are welcome to call or return to the office at any time prior to his procedure should there be any new problems, questions or concerns for which we can be of help.

## 2022-05-18 ENCOUNTER — TELEPHONE (OUTPATIENT)
Dept: CARDIOTHORACIC SURGERY | Age: 62
End: 2022-05-18

## 2022-05-18 NOTE — TELEPHONE ENCOUNTER
Spoke with patient in the office regarding surgery scheduled for 5/26/2022 at 7:00 am with arrival time of 5:00 am at [facility]. with Dr. Tasia Beckwith to include: aortic valve replacement-tissue valve, coronary artery bypass graft x 1 with sternal plating x 2. Patient completed carotid duplex studies and bilateral lower extremity vein mapping on 5/12/2022 with pre-op lab work completion scheduled for 5/20/2022 at 12:00 pm. Patient has been instructed to take his last dose of Eliquis on 5/20/2022. Patient has also been instructed not to eat or drink after midnight the day of surgery and to call our office with any questions or concerns.

## 2022-05-18 NOTE — PROGRESS NOTES
Place patient label inside box (if no patient label, complete below)  Name:  :  MR#:   Manjit He / PROCEDURE  1. I (we)Minesh (Patient Name) authorize DR. Leon Almazan (Provider / Lou Weems) and/or such assistants as may be selected by him/her, to perform the following operation/procedure(s): AORTIC VALVE REPLACEMENT-TISSUE, CORONARY ARTERY BYPASS GRAFT x1 WITH STERNAL PLATING x2       Note: If unable to obtain consent prior to an emergent procedure, document the emergent reason in the medical record. This procedure has been explained to my (our) satisfaction and included in the explanation was:  A) The intended benefit, nature, and extent of the procedure to be performed;  B) The significant risks involved and the probability of success;  C) Alternative procedures and methods of treatment;  D) The dangers and probable consequences of such alternatives (including no procedure or treatment); E) The expected consequences of the procedure on my future health;  F) Whether other qualified individuals would be performing important surgical tasks and/or whether  would be present to advise or support the procedure. I (we) understand that there are other risks of infection and other serious complications in the pre-operative/procedural and postoperative/procedural stages of my (our) care. I (we) have asked all of the questions which I (we) thought were important in deciding whether or not to undergo treatment or diagnosis. These questions have been answered to my (our) satisfaction. I (we) understand that no assurance can be given that the procedure will be a success, and no guarantee or warranty of success has been given to me (us).     2. It has been explained to me (us) that during the course of the operation/procedure, unforeseen conditions may be revealed that necessitate extension of the original procedure(s) or different procedure(s) than those set forth in Paragraph 1. I (we) authorize and request that the above-named physician, his/her assistants or his/her designees, perform procedures as necessary and desirable if deemed to be in my (our) best interest.     Revised 8/2/2021                                                                          Page 1 of 2       3. I acknowledge that health care personnel may be observing this procedure for the purpose of medical education or other specified purposes as may be necessary as requested and/or approved by my (our) physician. 4. I (we) consent to the disposal by the hospital Pathologist of the removed tissue, parts or organs in accordance with hospital policy. 5. I do ____ do not ____ consent to the use of a local infiltration pain blocking agent that will be used by my provider/surgical provider to help alleviate pain during my procedure. 6. I do ____ do not ____ consent to an emergent blood transfusion in the case of a life-threatening situation that requires blood components to be administered. This consent is valid for 24 hours from the beginning of the procedure. 7. This patient does ____ or does not ____ currently have a DNR status/order. If DNR order is in place, obtain Addendum to the Surgical Consent for ALL Patients with a DNR Order to address nata-operative status for limited intervention or DNR suspension.      8. I have read and fully understand the above Consent for Operation/Procedure and that all blanks were completed before I signed the consent.   _____________________________       _____________________      ____/____am/pm  Signature of Patient or legal representative      Printed Name / Relationship            Date / Time   ____________________________       _____________________      ____/____am/pm  Witness to Signature                                    Printed Name                    Date / Time     If patient is unable to sign or is a minor, complete the following)  Patient is a minor, ____ years of age, or unable to sign because:   ______________________________________________________________________________________________    Finn If a phone consent is obtained, consent will be documented by using two health care professionals, each affirming that the consenting party has no questions and gives consent for the procedure discussed with the physician/provider.   _____________________          ____________________       _____/_____am/pm   2nd witness to phone consent        Printed name           Date / Time    Informed Consent:  I have provided the explanation described above in section 1 to the patient and/or legal representative.  I have provided the patient and/or legal representative with an opportunity to ask any questions about the proposed operation/procedure.   ___________________________          ____________________         ____/____am/pm  Provider / Proceduralist                            Printed name            Date / Time  Revised 8/2/2021                                                                      Page 2 of 2

## 2022-05-20 ENCOUNTER — HOSPITAL ENCOUNTER (OUTPATIENT)
Dept: GENERAL RADIOLOGY | Age: 62
Discharge: HOME | End: 2022-05-20
Payer: MEDICARE

## 2022-05-20 ENCOUNTER — HOSPITAL ENCOUNTER (OUTPATIENT)
Dept: PREADMISSION TESTING | Age: 62
Discharge: HOME | End: 2022-05-20
Payer: MEDICARE

## 2022-05-20 VITALS — BODY MASS INDEX: 40.3 KG/M2 | HEIGHT: 74 IN | WEIGHT: 314 LBS

## 2022-05-20 LAB
A/G RATIO: 1.4 (ref 1.1–2.2)
ABO/RH: NORMAL
ALBUMIN SERPL-MCNC: 4.4 G/DL (ref 3.4–5)
ALP BLD-CCNC: 92 U/L (ref 40–129)
ALT SERPL-CCNC: 20 U/L (ref 10–40)
ANION GAP SERPL CALCULATED.3IONS-SCNC: 14 MMOL/L (ref 3–16)
ANTIBODY SCREEN: NORMAL
APTT: 28.2 SEC (ref 26.2–38.6)
AST SERPL-CCNC: 20 U/L (ref 15–37)
BILIRUB SERPL-MCNC: 0.6 MG/DL (ref 0–1)
BILIRUBIN URINE: NEGATIVE
BLOOD, URINE: NEGATIVE
BUN BLDV-MCNC: 18 MG/DL (ref 7–20)
CALCIUM SERPL-MCNC: 9.1 MG/DL (ref 8.3–10.6)
CHLORIDE BLD-SCNC: 102 MMOL/L (ref 99–110)
CLARITY: CLEAR
CO2: 22 MMOL/L (ref 21–32)
COLOR: YELLOW
CREAT SERPL-MCNC: 1.2 MG/DL (ref 0.8–1.3)
EKG ATRIAL RATE: 300 BPM
EKG DIAGNOSIS: NORMAL
EKG Q-T INTERVAL: 364 MS
EKG QRS DURATION: 98 MS
EKG QTC CALCULATION (BAZETT): 490 MS
EKG R AXIS: 27 DEGREES
EKG T AXIS: 27 DEGREES
EKG VENTRICULAR RATE: 109 BPM
FIBRINOGEN: 367 MG/DL (ref 200–397)
GFR AFRICAN AMERICAN: >60
GFR NON-AFRICAN AMERICAN: >60
GLUCOSE BLD-MCNC: 158 MG/DL (ref 70–99)
GLUCOSE URINE: NEGATIVE MG/DL
HCT VFR BLD CALC: 38.6 % (ref 40.5–52.5)
HEMOGLOBIN: 12.9 G/DL (ref 13.5–17.5)
INR BLD: 0.9 (ref 0.88–1.12)
KETONES, URINE: NEGATIVE MG/DL
LEUKOCYTE ESTERASE, URINE: NEGATIVE
MAGNESIUM: 2.2 MG/DL (ref 1.8–2.4)
MCH RBC QN AUTO: 29 PG (ref 26–34)
MCHC RBC AUTO-ENTMCNC: 33.4 G/DL (ref 31–36)
MCV RBC AUTO: 87 FL (ref 80–100)
MICROSCOPIC EXAMINATION: NORMAL
NITRITE, URINE: NEGATIVE
PDW BLD-RTO: 14.7 % (ref 12.4–15.4)
PH UA: 6 (ref 5–8)
PLATELET # BLD: 295 K/UL (ref 135–450)
PMV BLD AUTO: 8.3 FL (ref 5–10.5)
POTASSIUM SERPL-SCNC: 3.7 MMOL/L (ref 3.5–5.1)
PROTEIN UA: NEGATIVE MG/DL
PROTHROMBIN TIME: 10.1 SEC (ref 9.9–12.7)
RBC # BLD: 4.44 M/UL (ref 4.2–5.9)
SODIUM BLD-SCNC: 138 MMOL/L (ref 136–145)
SPECIFIC GRAVITY UA: 1.01 (ref 1–1.03)
TOTAL PROTEIN: 7.6 G/DL (ref 6.4–8.2)
URINE TYPE: NORMAL
UROBILINOGEN, URINE: 0.2 E.U./DL
WBC # BLD: 7.4 K/UL (ref 4–11)

## 2022-05-20 PROCEDURE — 87641 MR-STAPH DNA AMP PROBE: CPT

## 2022-05-20 PROCEDURE — 85027 COMPLETE CBC AUTOMATED: CPT

## 2022-05-20 PROCEDURE — 85730 THROMBOPLASTIN TIME PARTIAL: CPT

## 2022-05-20 PROCEDURE — 83036 HEMOGLOBIN GLYCOSYLATED A1C: CPT

## 2022-05-20 PROCEDURE — 86850 RBC ANTIBODY SCREEN: CPT

## 2022-05-20 PROCEDURE — 85610 PROTHROMBIN TIME: CPT

## 2022-05-20 PROCEDURE — 86900 BLOOD TYPING SEROLOGIC ABO: CPT

## 2022-05-20 PROCEDURE — 80053 COMPREHEN METABOLIC PANEL: CPT

## 2022-05-20 PROCEDURE — 85384 FIBRINOGEN ACTIVITY: CPT

## 2022-05-20 PROCEDURE — 81003 URINALYSIS AUTO W/O SCOPE: CPT

## 2022-05-20 PROCEDURE — 86901 BLOOD TYPING SEROLOGIC RH(D): CPT

## 2022-05-20 PROCEDURE — 87086 URINE CULTURE/COLONY COUNT: CPT

## 2022-05-20 PROCEDURE — 83735 ASSAY OF MAGNESIUM: CPT

## 2022-05-20 PROCEDURE — 93005 ELECTROCARDIOGRAM TRACING: CPT | Performed by: THORACIC SURGERY (CARDIOTHORACIC VASCULAR SURGERY)

## 2022-05-20 PROCEDURE — 71046 X-RAY EXAM CHEST 2 VIEWS: CPT

## 2022-05-20 PROCEDURE — 93010 ELECTROCARDIOGRAM REPORT: CPT | Performed by: INTERNAL MEDICINE

## 2022-05-20 RX ORDER — ACETAMINOPHEN AND CODEINE PHOSPHATE 300; 30 MG/1; MG/1
1 TABLET ORAL EVERY 4 HOURS PRN
Status: ON HOLD | COMMUNITY
End: 2022-06-04 | Stop reason: HOSPADM

## 2022-05-20 NOTE — PROGRESS NOTES
PRE-OP CARDIAC SURGERY TEACHING    Reviewed the upcoming operation with patient and family. Patient educated, using the teach back method. Reviewed the open heart teaching binder with the patient, specifically patient plan of care, goals and expectations, diet and exercise, medications and discharge instructions. Instructed that after surgery nurses will be making frequent skin assessments particularly at the bony prominences. Gave patient prescription for Bactroban Nasal ointment 2% 1 gram with instructions for administration. Hibiclens was also given with instructions for administration pre-operatively. Instructed to take beta blocker with a small sip of water prior to coming in to the hospital the morning of surgery. All patient questions were answered pertaining to upcoming surgery and had no further questions.      RENETTA Quevedo  5/20/2022  12:23 PM

## 2022-05-20 NOTE — PROGRESS NOTES
[address] [facility]                          Date of Procedure 5/26/22       PRIOR TO PROCEDURE DATE:  1. Please follow any guidelines/instructions prior to your procedure as advised by your surgeon. 2. Arrange for someone to drive you home and be with you for the first 24 hours after discharge for your safety after your procedure for which you received sedation. Ensure it is someone we can share information with regarding your discharge. 3. You must contact your surgeon for instructions IF:   You are taking any blood thinners, aspirin, anti-inflammatory or vitamin E.   There is a change in your physical condition such as a cold, fever, rash, cuts, sores or any other infection, especially near your surgical site. 4. Do not drink alcohol the day before or day of your procedure. 5. A Pre-op History and Physical for surgery MUST be completed by your Physician or Urgent Care within 30 days of your procedure date. Please bring a copy with you on the day of your procedure and along with any other testing performed. THE DAY OF YOUR PROCEDURE:  1. Follow instructions for ARRIVAL TIME as DIRECTED BY YOUR SURGEON. 2. Enter the MAIN entrance from [facility] and follow the signs to the free [email or web address] or [employer] parking (offered free of charge 6am-5pm). 3. Enter the Main Entrance of the hospital (do NOT enter from the lower level of the parking garage). Upon entrance, check in with the  at the main desk on your left. If no one is available at the desk, proceed into the [facility] Waiting Room and go through the door directly into the [facility]. There is a Check-in desk ACROSS from Room 5 (marked with a sign hanging from the ceiling). The phone number for the surgery center is [phone].    4. DO NOT EAT ANYTHING eight hours prior to arrival for surgery. May have 8 ounces of water 4 hours prior to arrival for surgery.  NOTE: ALL Gastric, Bariatric and Bowel surgery patients MUST follow their surgeon's instructions. 5.  MEDICATIONS    Take the following medications with a SMALL sip of water: metoprolol   Use your usual dose of inhalers the morning of surgery. BRING your rescue inhaler with you to hospital.    Anesthesia does NOT want you to take insulin the morning of surgery. They will control your blood sugar while you are at the hospital. Please contact your ordering physician for instructions regarding your insulin the night before your procedure. If you have an insulin pump, please keep it set on basal rate. 6.  Do not swallow water when brushing teeth. No gum, candy, mints or ice chips. Refrain from smoking or at least decrease the amount. 7.  Dress in loose, comfortable clothing appropriate for redressing after your procedure. Do not wear jewelry (including body piercings), make-up (especially NO eye make-up), fingernail polish (NO toenail polish if foot/leg surgery), lotion, powders or metal hairclips. 8.  Dentures, glasses, or contacts will need to be removed before your procedure. Bring cases for your glasses, contacts, dentures, or hearing aids to protect them while you are in surgery. 9.  If you use a CPAP, please bring it with you on the day of your procedure. 10. We recommend that valuable personal  belongings, such as cash, cell phones, e-tablets or jewelry, be left at home during your stay. The hospital will not be responsible for valuables that are not secured in the hospital safe. However, if your insurance requires a co-pay, you may want to bring a method of payment, i.e. Check or credit card, if you wish to pay your co-pay the day of surgery. 11. If you are to stay overnight, you may bring a bag with personal items. Please have any large items you may need brought in by your family after your arrival to your hospital room.     12. If you have a Living Will or Durable Power of Shelby Galeazzi, please bring a copy on the day of your procedure. 15. With your permission, one family member may accompany you while you are being prepared for surgery. Once you are ready, additional family members may join you. HOW WE KEEP YOU SAFE and WORK TO PREVENT SURGICAL SITE INFECTIONS:  1. Health care workers should always check your ID bracelet to verify your name and birth date. You will be asked many times to state your name, date of birth, and allergies. 2. Health care workers should always clean their hands with soap or alcohol gel before providing care to you. It is okay to ask anyone if they cleaned their hands before they touch you. 3. You will be actively involved in verifying the type of procedure you are having and ensuring the correct surgical site. This will be confirmed multiple times prior to your procedure. Do NOT christi your surgery site UNLESS instructed to by your surgeon. 4. Do not shave or wax for 72 hours prior to procedure near your operative site. Shaving with a razor can irritate your skin and make it easier to develop an infection. On the day of your procedure, any hair that needs to be removed near the surgical site will be clipped by a healthcare worker using a special clippers designed to avoid skin irritation. 5. When you are in the operating room, your surgical site will be cleansed with a special soap, and in most cases, you will be given an antibiotic before the surgery begins. What to expect AFTER YOUR PROCEDURE:  1. Immediately following your procedure, your will be taken to the PACU for the first phase of your recovery. Your nurse will help you recover from any potential side effects of anesthesia, such as extreme drowsiness, changes in your vital signs or breathing patterns. Nausea, headache, muscle aches, or sore throat may also occur after anesthesia. Your nurse will help you manage these potential side effects.     2. For comfort and safety, arrange to have someone at home with you for the first 24 hours after discharge. 3. You and your family will be given written instructions about your diet, activity, dressing care, medications, and return visits. 4. Once at home, should issues with nausea, pain, or bleeding occur, or should you notice any signs of infection, you should call your surgeon. 5. Always clean your hands before and after caring for your wound. Do not let your family touch your surgery site without cleaning their hands. 6. Narcotic pain medications can cause significant constipation. You may want to add a stool softener to your postoperative medication schedule or speak to your surgeon on how best to manage this SIDE EFFECT. SPECIAL INSTRUCTIONS patient acknowledges understanding of pre op instructions. Thank you for allowing us to care for you. We strive to exceed your expectations in the overall delivery of care and service provided to you and your family. If you need to contact us for any reason, please call us at [phone]. Instructions reviewed and copy given to patient during preadmission testing visit.   Betty Robles RN.5/20/2022 .1:21 PM      ADDITIONAL EDUCATIONAL INFORMATION REVIEWED / PROVIDED TO YOU AND YOUR FAMILY:  Yes Taking Control of Your Pain   Yes FAQs about Surgical Site Infections    Yes Cardiac Surgery Instructions for AM admission to the hospital  Yes Bactroban® Nasal Ointment Instructions for Cardiac Surgery  Yes Learning About Preventing Pressure Sores  Yes Cardiac Surgery Preoperative Hibiclens® Bathing Instructions  Yes Your Care after Heart Surgery Binder    Yes Hibiclens® Bathing Instructions

## 2022-05-20 NOTE — PROGRESS NOTES
Patient here for PAT visit along with daughters x 2. Alert oriented x 4 denies chest pain or sob. To bathroom, urine obtained for UA and urine cx. -db    1223pm EKG done at bedside. -db     1230pm mrsa swab obtained. -db    1245pm anesthesia Dr SALOMON unable to see patient at this time;  Dr. Sivakumar Barclay unable to see patient at this time and will see patient day of surgery. -db    1245pm Bud Goel RN here with patient. -db    1315-1345pm PAT instructions reviewed with patient along with I/S (2000 +) pain booklet, hibiclens soap, bactroban oint, prevention of pressure sores and surgical site infections. -db    1400pm to pharmacy for bactroban ointment then radiology for CXR.  -db

## 2022-05-21 LAB
ESTIMATED AVERAGE GLUCOSE: 131.2 MG/DL
HBA1C MFR BLD: 6.2 %
URINE CULTURE, ROUTINE: NORMAL

## 2022-05-22 LAB — MRSA SCREEN RT-PCR: NORMAL

## 2022-05-26 ENCOUNTER — ANESTHESIA EVENT (OUTPATIENT)
Dept: OPERATING ROOM | Age: 62
DRG: 220 | End: 2022-05-26
Payer: MEDICARE

## 2022-05-31 ENCOUNTER — ANESTHESIA (OUTPATIENT)
Dept: OPERATING ROOM | Age: 62
DRG: 220 | End: 2022-05-31
Payer: MEDICARE

## 2022-05-31 ENCOUNTER — APPOINTMENT (OUTPATIENT)
Dept: GENERAL RADIOLOGY | Age: 62
DRG: 220 | End: 2022-05-31
Attending: THORACIC SURGERY (CARDIOTHORACIC VASCULAR SURGERY)
Payer: MEDICARE

## 2022-05-31 ENCOUNTER — HOSPITAL ENCOUNTER (INPATIENT)
Age: 62
LOS: 5 days | Discharge: HOME HEALTH | DRG: 220 | End: 2022-06-05
Attending: THORACIC SURGERY (CARDIOTHORACIC VASCULAR SURGERY) | Admitting: THORACIC SURGERY (CARDIOTHORACIC VASCULAR SURGERY)
Payer: MEDICARE

## 2022-05-31 DIAGNOSIS — G89.18 ACUTE POSTOPERATIVE PAIN: Primary | ICD-10-CM

## 2022-05-31 DIAGNOSIS — I25.10 DISEASE OF CARDIOVASCULAR SYSTEM: ICD-10-CM

## 2022-05-31 PROBLEM — I35.0 AORTIC STENOSIS WITH TRILEAFLET VALVE: Status: ACTIVE | Noted: 2022-05-31

## 2022-05-31 LAB
ABO/RH: NORMAL
ACTIVATED CLOTTING TIME: 107 SEC (ref 99–130)
ACTIVATED CLOTTING TIME: 127 SEC (ref 99–130)
ACTIVATED CLOTTING TIME: 424 SEC (ref 99–130)
ACTIVATED CLOTTING TIME: 438 SEC (ref 99–130)
ACTIVATED CLOTTING TIME: 438 SEC (ref 99–130)
ACTIVATED CLOTTING TIME: 445 SEC (ref 99–130)
ACTIVATED CLOTTING TIME: 446 SEC (ref 99–130)
ACTIVATED CLOTTING TIME: 463 SEC (ref 99–130)
ACTIVATED CLOTTING TIME: 86 SEC (ref 99–130)
ALBUMIN SERPL-MCNC: 3.9 G/DL (ref 3.4–5)
ANION GAP SERPL CALCULATED.3IONS-SCNC: 11 MMOL/L (ref 3–16)
ANION GAP SERPL CALCULATED.3IONS-SCNC: 8 MMOL/L (ref 3–16)
ANTIBODY SCREEN: NORMAL
APTT: 26.7 SEC (ref 23–34.3)
BASE EXCESS ARTERIAL: -2 (ref -3–3)
BASE EXCESS ARTERIAL: -3 (ref -3–3)
BASE EXCESS ARTERIAL: -4 (ref -3–3)
BASE EXCESS ARTERIAL: -4 (ref -3–3)
BASE EXCESS ARTERIAL: -5 (ref -3–3)
BASE EXCESS ARTERIAL: 0 (ref -3–3)
BASE EXCESS ARTERIAL: 0 (ref -3–3)
BUN BLDV-MCNC: 19 MG/DL (ref 7–20)
BUN BLDV-MCNC: 19 MG/DL (ref 7–20)
CALCIUM IONIZED: 1.05 MMOL/L (ref 1.12–1.32)
CALCIUM IONIZED: 1.08 MMOL/L (ref 1.12–1.32)
CALCIUM IONIZED: 1.08 MMOL/L (ref 1.12–1.32)
CALCIUM IONIZED: 1.12 MMOL/L (ref 1.12–1.32)
CALCIUM IONIZED: 1.12 MMOL/L (ref 1.12–1.32)
CALCIUM IONIZED: 1.21 MMOL/L (ref 1.12–1.32)
CALCIUM IONIZED: 1.22 MMOL/L (ref 1.12–1.32)
CALCIUM IONIZED: 1.44 MMOL/L (ref 1.12–1.32)
CALCIUM SERPL-MCNC: 8.3 MG/DL (ref 8.3–10.6)
CALCIUM SERPL-MCNC: 8.3 MG/DL (ref 8.3–10.6)
CHLORIDE BLD-SCNC: 109 MMOL/L (ref 99–110)
CHLORIDE BLD-SCNC: 109 MMOL/L (ref 99–110)
CHOLESTEROL, TOTAL: 211 MG/DL (ref 0–199)
CO2: 20 MMOL/L (ref 21–32)
CO2: 24 MMOL/L (ref 21–32)
CREAT SERPL-MCNC: 1.2 MG/DL (ref 0.8–1.3)
CREAT SERPL-MCNC: 1.2 MG/DL (ref 0.8–1.3)
GFR AFRICAN AMERICAN: >60
GFR AFRICAN AMERICAN: >60
GFR NON-AFRICAN AMERICAN: >60
GFR NON-AFRICAN AMERICAN: >60
GLUCOSE BLD-MCNC: 113 MG/DL (ref 70–99)
GLUCOSE BLD-MCNC: 119 MG/DL (ref 70–99)
GLUCOSE BLD-MCNC: 121 MG/DL (ref 70–99)
GLUCOSE BLD-MCNC: 121 MG/DL (ref 70–99)
GLUCOSE BLD-MCNC: 122 MG/DL (ref 70–99)
GLUCOSE BLD-MCNC: 124 MG/DL (ref 70–99)
GLUCOSE BLD-MCNC: 124 MG/DL (ref 70–99)
GLUCOSE BLD-MCNC: 125 MG/DL (ref 70–99)
GLUCOSE BLD-MCNC: 130 MG/DL (ref 70–99)
GLUCOSE BLD-MCNC: 131 MG/DL (ref 70–99)
GLUCOSE BLD-MCNC: 133 MG/DL (ref 70–99)
GLUCOSE BLD-MCNC: 135 MG/DL (ref 70–99)
GLUCOSE BLD-MCNC: 137 MG/DL (ref 70–99)
GLUCOSE BLD-MCNC: 142 MG/DL (ref 70–99)
GLUCOSE BLD-MCNC: 145 MG/DL (ref 70–99)
GLUCOSE BLD-MCNC: 149 MG/DL (ref 70–99)
GLUCOSE BLD-MCNC: 150 MG/DL (ref 70–99)
GLUCOSE BLD-MCNC: 154 MG/DL (ref 70–99)
GLUCOSE BLD-MCNC: 159 MG/DL (ref 70–99)
GLUCOSE BLD-MCNC: 162 MG/DL (ref 70–99)
GLUCOSE BLD-MCNC: 166 MG/DL (ref 70–99)
GLUCOSE BLD-MCNC: 171 MG/DL (ref 70–99)
GLUCOSE BLD-MCNC: 178 MG/DL (ref 70–99)
HCO3 ARTERIAL: 21.1 MMOL/L (ref 21–29)
HCO3 ARTERIAL: 22.1 MMOL/L (ref 21–29)
HCO3 ARTERIAL: 22.2 MMOL/L (ref 21–29)
HCO3 ARTERIAL: 22.4 MMOL/L (ref 21–29)
HCO3 ARTERIAL: 22.4 MMOL/L (ref 21–29)
HCO3 ARTERIAL: 22.5 MMOL/L (ref 21–29)
HCO3 ARTERIAL: 22.8 MMOL/L (ref 21–29)
HCO3 ARTERIAL: 23 MMOL/L (ref 21–29)
HCO3 ARTERIAL: 23.7 MMOL/L (ref 21–29)
HCO3 ARTERIAL: 23.8 MMOL/L (ref 21–29)
HCO3 ARTERIAL: 24.2 MMOL/L (ref 21–29)
HCO3 ARTERIAL: 24.5 MMOL/L (ref 21–29)
HCO3 ARTERIAL: 24.8 MMOL/L (ref 21–29)
HCO3 ARTERIAL: 25.6 MMOL/L (ref 21–29)
HCO3 ARTERIAL: 26.3 MMOL/L (ref 21–29)
HCT VFR BLD CALC: 35 % (ref 40.5–52.5)
HDLC SERPL-MCNC: 50 MG/DL (ref 40–60)
HEMOGLOBIN: 11.2 G/DL (ref 13.5–17.5)
LACTATE: 1.73 MMOL/L (ref 0.4–2)
LACTATE: 2.11 MMOL/L (ref 0.4–2)
LDL CHOLESTEROL CALCULATED: 140 MG/DL
MAGNESIUM: 2.7 MG/DL (ref 1.8–2.4)
MCH RBC QN AUTO: 28.3 PG (ref 26–34)
MCHC RBC AUTO-ENTMCNC: 32 G/DL (ref 31–36)
MCV RBC AUTO: 88.3 FL (ref 80–100)
O2 SAT, ARTERIAL: 100 % (ref 93–100)
O2 SAT, ARTERIAL: 97 % (ref 93–100)
O2 SAT, ARTERIAL: 97 % (ref 93–100)
O2 SAT, ARTERIAL: 98 % (ref 93–100)
O2 SAT, ARTERIAL: 99 % (ref 93–100)
O2 SAT, ARTERIAL: 99 % (ref 93–100)
PCO2 ARTERIAL: 38 MM HG (ref 35–45)
PCO2 ARTERIAL: 38.8 MM HG (ref 35–45)
PCO2 ARTERIAL: 38.8 MM HG (ref 35–45)
PCO2 ARTERIAL: 38.9 MM HG (ref 35–45)
PCO2 ARTERIAL: 40.4 MM HG (ref 35–45)
PCO2 ARTERIAL: 40.7 MM HG (ref 35–45)
PCO2 ARTERIAL: 41.6 MM HG (ref 35–45)
PCO2 ARTERIAL: 43.7 MM HG (ref 35–45)
PCO2 ARTERIAL: 45.7 MM HG (ref 35–45)
PCO2 ARTERIAL: 47.3 MM HG (ref 35–45)
PCO2 ARTERIAL: 47.6 MM HG (ref 35–45)
PCO2 ARTERIAL: 50 MM HG (ref 35–45)
PCO2 ARTERIAL: 50.1 MM HG (ref 35–45)
PCO2 ARTERIAL: 52.6 MM HG (ref 35–45)
PCO2 ARTERIAL: 58.2 MM HG (ref 35–45)
PDW BLD-RTO: 14.7 % (ref 12.4–15.4)
PERFORMED ON: ABNORMAL
PH ARTERIAL: 7.25 (ref 7.35–7.45)
PH ARTERIAL: 7.28 (ref 7.35–7.45)
PH ARTERIAL: 7.3 (ref 7.35–7.45)
PH ARTERIAL: 7.31 (ref 7.35–7.45)
PH ARTERIAL: 7.32 (ref 7.35–7.45)
PH ARTERIAL: 7.32 (ref 7.35–7.45)
PH ARTERIAL: 7.33 (ref 7.35–7.45)
PH ARTERIAL: 7.33 (ref 7.35–7.45)
PH ARTERIAL: 7.35 (ref 7.35–7.45)
PH ARTERIAL: 7.37 (ref 7.35–7.45)
PH ARTERIAL: 7.42 (ref 7.35–7.45)
PHOSPHORUS: 3.6 MG/DL (ref 2.5–4.9)
PLATELET # BLD: 182 K/UL (ref 135–450)
PMV BLD AUTO: 8.3 FL (ref 5–10.5)
PO2 ARTERIAL: 103.4 MM HG (ref 75–108)
PO2 ARTERIAL: 103.9 MM HG (ref 75–108)
PO2 ARTERIAL: 106.3 MM HG (ref 75–108)
PO2 ARTERIAL: 112 MM HG (ref 75–108)
PO2 ARTERIAL: 112.9 MM HG (ref 75–108)
PO2 ARTERIAL: 123.1 MM HG (ref 75–108)
PO2 ARTERIAL: 149.6 MM HG (ref 75–108)
PO2 ARTERIAL: 170.3 MM HG (ref 75–108)
PO2 ARTERIAL: 257.3 MM HG (ref 75–108)
PO2 ARTERIAL: 259.5 MM HG (ref 75–108)
PO2 ARTERIAL: 285.8 MM HG (ref 75–108)
PO2 ARTERIAL: 312.4 MM HG (ref 75–108)
PO2 ARTERIAL: 354 MM HG (ref 75–108)
PO2 ARTERIAL: 366 MM HG (ref 75–108)
PO2 ARTERIAL: 93 MM HG (ref 75–108)
POC POTASSIUM: 3.7 MMOL/L (ref 3.5–5.1)
POC POTASSIUM: 4.1 MMOL/L (ref 3.5–5.1)
POC POTASSIUM: 4.2 MMOL/L (ref 3.5–5.1)
POC POTASSIUM: 4.2 MMOL/L (ref 3.5–5.1)
POC POTASSIUM: 4.3 MMOL/L (ref 3.5–5.1)
POC POTASSIUM: 4.4 MMOL/L (ref 3.5–5.1)
POC POTASSIUM: 5 MMOL/L (ref 3.5–5.1)
POC SAMPLE TYPE: ABNORMAL
POC SODIUM: 137 MMOL/L (ref 136–145)
POC SODIUM: 139 MMOL/L (ref 136–145)
POC SODIUM: 139 MMOL/L (ref 136–145)
POC SODIUM: 141 MMOL/L (ref 136–145)
POC SODIUM: 142 MMOL/L (ref 136–145)
POC SODIUM: 143 MMOL/L (ref 136–145)
POTASSIUM SERPL-SCNC: 4.6 MMOL/L (ref 3.5–5.1)
POTASSIUM SERPL-SCNC: 5.4 MMOL/L (ref 3.5–5.1)
RBC # BLD: 3.96 M/UL (ref 4.2–5.9)
SODIUM BLD-SCNC: 140 MMOL/L (ref 136–145)
SODIUM BLD-SCNC: 141 MMOL/L (ref 136–145)
TCO2 ARTERIAL: 22 MMOL/L
TCO2 ARTERIAL: 23 MMOL/L
TCO2 ARTERIAL: 23 MMOL/L
TCO2 ARTERIAL: 24 MMOL/L
TCO2 ARTERIAL: 25 MMOL/L
TCO2 ARTERIAL: 25 MMOL/L
TCO2 ARTERIAL: 26 MMOL/L
TCO2 ARTERIAL: 27 MMOL/L
TCO2 ARTERIAL: 28 MMOL/L
TRIGL SERPL-MCNC: 105 MG/DL (ref 0–150)
VLDLC SERPL CALC-MCNC: 21 MG/DL
WBC # BLD: 20.7 K/UL (ref 4–11)

## 2022-05-31 PROCEDURE — 2720000010 HC SURG SUPPLY STERILE: Performed by: THORACIC SURGERY (CARDIOTHORACIC VASCULAR SURGERY)

## 2022-05-31 PROCEDURE — 02L70CK OCCLUSION OF LEFT ATRIAL APPENDAGE WITH EXTRALUMINAL DEVICE, OPEN APPROACH: ICD-10-PCS | Performed by: THORACIC SURGERY (CARDIOTHORACIC VASCULAR SURGERY)

## 2022-05-31 PROCEDURE — 2700000000 HC OXYGEN THERAPY PER DAY

## 2022-05-31 PROCEDURE — 33405 REPLACEMENT AORTIC VALVE OPN: CPT | Performed by: THORACIC SURGERY (CARDIOTHORACIC VASCULAR SURGERY)

## 2022-05-31 PROCEDURE — 84295 ASSAY OF SERUM SODIUM: CPT

## 2022-05-31 PROCEDURE — 2580000003 HC RX 258: Performed by: THORACIC SURGERY (CARDIOTHORACIC VASCULAR SURGERY)

## 2022-05-31 PROCEDURE — 2709999900 HC NON-CHARGEABLE SUPPLY: Performed by: THORACIC SURGERY (CARDIOTHORACIC VASCULAR SURGERY)

## 2022-05-31 PROCEDURE — 83735 ASSAY OF MAGNESIUM: CPT

## 2022-05-31 PROCEDURE — 2580000003 HC RX 258: Performed by: ANESTHESIOLOGY

## 2022-05-31 PROCEDURE — 85730 THROMBOPLASTIN TIME PARTIAL: CPT

## 2022-05-31 PROCEDURE — 33510 CABG VEIN SINGLE: CPT | Performed by: THORACIC SURGERY (CARDIOTHORACIC VASCULAR SURGERY)

## 2022-05-31 PROCEDURE — 84132 ASSAY OF SERUM POTASSIUM: CPT

## 2022-05-31 PROCEDURE — 3700000001 HC ADD 15 MINUTES (ANESTHESIA): Performed by: THORACIC SURGERY (CARDIOTHORACIC VASCULAR SURGERY)

## 2022-05-31 PROCEDURE — 94002 VENT MGMT INPAT INIT DAY: CPT

## 2022-05-31 PROCEDURE — C1713 ANCHOR/SCREW BN/BN,TIS/BN: HCPCS | Performed by: THORACIC SURGERY (CARDIOTHORACIC VASCULAR SURGERY)

## 2022-05-31 PROCEDURE — 94150 VITAL CAPACITY TEST: CPT

## 2022-05-31 PROCEDURE — 6370000000 HC RX 637 (ALT 250 FOR IP): Performed by: THORACIC SURGERY (CARDIOTHORACIC VASCULAR SURGERY)

## 2022-05-31 PROCEDURE — 6360000002 HC RX W HCPCS: Performed by: THORACIC SURGERY (CARDIOTHORACIC VASCULAR SURGERY)

## 2022-05-31 PROCEDURE — 85347 COAGULATION TIME ACTIVATED: CPT

## 2022-05-31 PROCEDURE — 5A1221Z PERFORMANCE OF CARDIAC OUTPUT, CONTINUOUS: ICD-10-PCS | Performed by: THORACIC SURGERY (CARDIOTHORACIC VASCULAR SURGERY)

## 2022-05-31 PROCEDURE — 94761 N-INVAS EAR/PLS OXIMETRY MLT: CPT

## 2022-05-31 PROCEDURE — 6360000002 HC RX W HCPCS: Performed by: ANESTHESIOLOGY

## 2022-05-31 PROCEDURE — 3700000000 HC ANESTHESIA ATTENDED CARE: Performed by: THORACIC SURGERY (CARDIOTHORACIC VASCULAR SURGERY)

## 2022-05-31 PROCEDURE — 2780000006 HC MISC HEART VALVE: Performed by: THORACIC SURGERY (CARDIOTHORACIC VASCULAR SURGERY)

## 2022-05-31 PROCEDURE — A4216 STERILE WATER/SALINE, 10 ML: HCPCS | Performed by: THORACIC SURGERY (CARDIOTHORACIC VASCULAR SURGERY)

## 2022-05-31 PROCEDURE — P9041 ALBUMIN (HUMAN),5%, 50ML: HCPCS | Performed by: THORACIC SURGERY (CARDIOTHORACIC VASCULAR SURGERY)

## 2022-05-31 PROCEDURE — 33508 ENDOSCOPIC VEIN HARVEST: CPT | Performed by: THORACIC SURGERY (CARDIOTHORACIC VASCULAR SURGERY)

## 2022-05-31 PROCEDURE — 3600000018 HC SURGERY OHS ADDTL 15MIN: Performed by: THORACIC SURGERY (CARDIOTHORACIC VASCULAR SURGERY)

## 2022-05-31 PROCEDURE — 02RF08Z REPLACEMENT OF AORTIC VALVE WITH ZOOPLASTIC TISSUE, OPEN APPROACH: ICD-10-PCS | Performed by: THORACIC SURGERY (CARDIOTHORACIC VASCULAR SURGERY)

## 2022-05-31 PROCEDURE — 86850 RBC ANTIBODY SCREEN: CPT

## 2022-05-31 PROCEDURE — 88305 TISSUE EXAM BY PATHOLOGIST: CPT

## 2022-05-31 PROCEDURE — 85027 COMPLETE CBC AUTOMATED: CPT

## 2022-05-31 PROCEDURE — C9113 INJ PANTOPRAZOLE SODIUM, VIA: HCPCS | Performed by: THORACIC SURGERY (CARDIOTHORACIC VASCULAR SURGERY)

## 2022-05-31 PROCEDURE — C1729 CATH, DRAINAGE: HCPCS | Performed by: THORACIC SURGERY (CARDIOTHORACIC VASCULAR SURGERY)

## 2022-05-31 PROCEDURE — 021009W BYPASS CORONARY ARTERY, ONE ARTERY FROM AORTA WITH AUTOLOGOUS VENOUS TISSUE, OPEN APPROACH: ICD-10-PCS | Performed by: THORACIC SURGERY (CARDIOTHORACIC VASCULAR SURGERY)

## 2022-05-31 PROCEDURE — 82330 ASSAY OF CALCIUM: CPT

## 2022-05-31 PROCEDURE — 82947 ASSAY GLUCOSE BLOOD QUANT: CPT

## 2022-05-31 PROCEDURE — 88311 DECALCIFY TISSUE: CPT

## 2022-05-31 PROCEDURE — 3600000008 HC SURGERY OHS BASE: Performed by: THORACIC SURGERY (CARDIOTHORACIC VASCULAR SURGERY)

## 2022-05-31 PROCEDURE — 82803 BLOOD GASES ANY COMBINATION: CPT

## 2022-05-31 PROCEDURE — 80069 RENAL FUNCTION PANEL: CPT

## 2022-05-31 PROCEDURE — 76942 ECHO GUIDE FOR BIOPSY: CPT | Performed by: ANESTHESIOLOGY

## 2022-05-31 PROCEDURE — 2000000000 HC ICU R&B

## 2022-05-31 PROCEDURE — 33268 EXCL LAA OPN OTH PX ANY METH: CPT | Performed by: THORACIC SURGERY (CARDIOTHORACIC VASCULAR SURGERY)

## 2022-05-31 PROCEDURE — A4217 STERILE WATER/SALINE, 500 ML: HCPCS | Performed by: THORACIC SURGERY (CARDIOTHORACIC VASCULAR SURGERY)

## 2022-05-31 PROCEDURE — 06BP4ZZ EXCISION OF RIGHT SAPHENOUS VEIN, PERCUTANEOUS ENDOSCOPIC APPROACH: ICD-10-PCS | Performed by: THORACIC SURGERY (CARDIOTHORACIC VASCULAR SURGERY)

## 2022-05-31 PROCEDURE — 2500000003 HC RX 250 WO HCPCS: Performed by: ANESTHESIOLOGY

## 2022-05-31 PROCEDURE — 36415 COLL VENOUS BLD VENIPUNCTURE: CPT

## 2022-05-31 PROCEDURE — 86900 BLOOD TYPING SEROLOGIC ABO: CPT

## 2022-05-31 PROCEDURE — 83605 ASSAY OF LACTIC ACID: CPT

## 2022-05-31 PROCEDURE — C1889 IMPLANT/INSERT DEVICE, NOC: HCPCS | Performed by: THORACIC SURGERY (CARDIOTHORACIC VASCULAR SURGERY)

## 2022-05-31 PROCEDURE — 2500000003 HC RX 250 WO HCPCS: Performed by: THORACIC SURGERY (CARDIOTHORACIC VASCULAR SURGERY)

## 2022-05-31 PROCEDURE — 86901 BLOOD TYPING SEROLOGIC RH(D): CPT

## 2022-05-31 PROCEDURE — 80061 LIPID PANEL: CPT

## 2022-05-31 PROCEDURE — 71045 X-RAY EXAM CHEST 1 VIEW: CPT

## 2022-05-31 DEVICE — INSPIRIS RESILIA AORTIC VALVE
Type: IMPLANTABLE DEVICE | Status: FUNCTIONAL
Brand: INSPIRIS RESILIA AORTIC VALVE

## 2022-05-31 DEVICE — LOCKING SCREW,AXS,SELF-DRILLING
Type: IMPLANTABLE DEVICE | Site: HEART | Status: FUNCTIONAL
Brand: AXS, SMARTLOCK

## 2022-05-31 DEVICE — Z INACTIVE USE 2124449 DEVICE OCCL CLP L35MM SM FOOTPRINT 1 HND APPL SUTURELESS W/: Type: IMPLANTABLE DEVICE | Site: HEART | Status: FUNCTIONAL

## 2022-05-31 DEVICE — STERNALPLATE, LADDER, NARROW: Type: IMPLANTABLE DEVICE | Site: HEART | Status: FUNCTIONAL

## 2022-05-31 DEVICE — STERNALPLATE, MANUBRIUM: Type: IMPLANTABLE DEVICE | Site: HEART | Status: FUNCTIONAL

## 2022-05-31 RX ORDER — POTASSIUM CHLORIDE 750 MG/1
10 TABLET, EXTENDED RELEASE ORAL
Status: DISCONTINUED | OUTPATIENT
Start: 2022-06-01 | End: 2022-06-03

## 2022-05-31 RX ORDER — ONDANSETRON 2 MG/ML
4 INJECTION INTRAMUSCULAR; INTRAVENOUS EVERY 6 HOURS PRN
Status: DISCONTINUED | OUTPATIENT
Start: 2022-05-31 | End: 2022-06-05 | Stop reason: HOSPADM

## 2022-05-31 RX ORDER — CHLORHEXIDINE GLUCONATE 0.12 MG/ML
15 RINSE ORAL 2 TIMES DAILY
Status: DISCONTINUED | OUTPATIENT
Start: 2022-05-31 | End: 2022-06-05 | Stop reason: HOSPADM

## 2022-05-31 RX ORDER — ACETAMINOPHEN 500 MG
1000 TABLET ORAL EVERY 6 HOURS
Status: DISCONTINUED | OUTPATIENT
Start: 2022-05-31 | End: 2022-06-05 | Stop reason: HOSPADM

## 2022-05-31 RX ORDER — ALBUTEROL SULFATE 2.5 MG/3ML
2.5 SOLUTION RESPIRATORY (INHALATION) EVERY 6 HOURS PRN
Status: DISCONTINUED | OUTPATIENT
Start: 2022-05-31 | End: 2022-06-05 | Stop reason: HOSPADM

## 2022-05-31 RX ORDER — 0.9 % SODIUM CHLORIDE 0.9 %
1000 INTRAVENOUS SOLUTION INTRAVENOUS CONTINUOUS PRN
Status: DISCONTINUED | OUTPATIENT
Start: 2022-05-31 | End: 2022-06-01

## 2022-05-31 RX ORDER — FENTANYL CITRATE 50 UG/ML
25 INJECTION, SOLUTION INTRAMUSCULAR; INTRAVENOUS
Status: DISCONTINUED | OUTPATIENT
Start: 2022-05-31 | End: 2022-06-01

## 2022-05-31 RX ORDER — DEXTROSE MONOHYDRATE 50 MG/ML
100 INJECTION, SOLUTION INTRAVENOUS PRN
Status: DISCONTINUED | OUTPATIENT
Start: 2022-05-31 | End: 2022-06-05 | Stop reason: HOSPADM

## 2022-05-31 RX ORDER — PROTAMINE SULFATE 10 MG/ML
INJECTION, SOLUTION INTRAVENOUS PRN
Status: DISCONTINUED | OUTPATIENT
Start: 2022-05-31 | End: 2022-05-31

## 2022-05-31 RX ORDER — METOPROLOL TARTRATE 5 MG/5ML
2.5 INJECTION INTRAVENOUS EVERY 10 MIN PRN
Status: DISCONTINUED | OUTPATIENT
Start: 2022-05-31 | End: 2022-06-05 | Stop reason: HOSPADM

## 2022-05-31 RX ORDER — CHLORHEXIDINE GLUCONATE 4 G/100ML
SOLUTION TOPICAL ONCE
Status: COMPLETED | OUTPATIENT
Start: 2022-05-31 | End: 2022-05-31

## 2022-05-31 RX ORDER — VANCOMYCIN HYDROCHLORIDE 1 G/20ML
INJECTION, POWDER, LYOPHILIZED, FOR SOLUTION INTRAVENOUS PRN
Status: DISCONTINUED | OUTPATIENT
Start: 2022-05-31 | End: 2022-05-31 | Stop reason: SDUPTHER

## 2022-05-31 RX ORDER — CLOPIDOGREL BISULFATE 75 MG/1
75 TABLET ORAL DAILY
Status: DISCONTINUED | OUTPATIENT
Start: 2022-06-01 | End: 2022-06-03

## 2022-05-31 RX ORDER — DIPHENHYDRAMINE HCL 25 MG
25 TABLET ORAL NIGHTLY PRN
Status: DISCONTINUED | OUTPATIENT
Start: 2022-06-01 | End: 2022-06-01

## 2022-05-31 RX ORDER — PROTAMINE SULFATE 10 MG/ML
50 INJECTION, SOLUTION INTRAVENOUS
Status: ACTIVE | OUTPATIENT
Start: 2022-05-31 | End: 2022-05-31

## 2022-05-31 RX ORDER — MAGNESIUM SULFATE IN WATER 40 MG/ML
2000 INJECTION, SOLUTION INTRAVENOUS PRN
Status: DISCONTINUED | OUTPATIENT
Start: 2022-05-31 | End: 2022-06-05 | Stop reason: HOSPADM

## 2022-05-31 RX ORDER — PANTOPRAZOLE SODIUM 40 MG/1
40 TABLET, DELAYED RELEASE ORAL
Status: DISCONTINUED | OUTPATIENT
Start: 2022-06-02 | End: 2022-06-05 | Stop reason: HOSPADM

## 2022-05-31 RX ORDER — ROCURONIUM BROMIDE 10 MG/ML
INJECTION, SOLUTION INTRAVENOUS PRN
Status: DISCONTINUED | OUTPATIENT
Start: 2022-05-31 | End: 2022-05-31 | Stop reason: SDUPTHER

## 2022-05-31 RX ORDER — HEPARIN SODIUM 1000 [USP'U]/ML
INJECTION, SOLUTION INTRAVENOUS; SUBCUTANEOUS PRN
Status: DISCONTINUED | OUTPATIENT
Start: 2022-05-31 | End: 2022-05-31 | Stop reason: SDUPTHER

## 2022-05-31 RX ORDER — SODIUM CHLORIDE 0.9 % (FLUSH) 0.9 %
5-40 SYRINGE (ML) INJECTION PRN
Status: DISCONTINUED | OUTPATIENT
Start: 2022-05-31 | End: 2022-05-31 | Stop reason: HOSPADM

## 2022-05-31 RX ORDER — ETOMIDATE 2 MG/ML
INJECTION INTRAVENOUS PRN
Status: DISCONTINUED | OUTPATIENT
Start: 2022-05-31 | End: 2022-05-31 | Stop reason: SDUPTHER

## 2022-05-31 RX ORDER — SODIUM CHLORIDE, SODIUM LACTATE, POTASSIUM CHLORIDE, CALCIUM CHLORIDE 600; 310; 30; 20 MG/100ML; MG/100ML; MG/100ML; MG/100ML
INJECTION, SOLUTION INTRAVENOUS CONTINUOUS
Status: DISCONTINUED | OUTPATIENT
Start: 2022-05-31 | End: 2022-06-01

## 2022-05-31 RX ORDER — SODIUM CHLORIDE 0.9 % (FLUSH) 0.9 %
5-40 SYRINGE (ML) INJECTION EVERY 12 HOURS SCHEDULED
Status: DISCONTINUED | OUTPATIENT
Start: 2022-05-31 | End: 2022-05-31 | Stop reason: HOSPADM

## 2022-05-31 RX ORDER — SODIUM CHLORIDE 9 MG/ML
INJECTION, SOLUTION INTRAVENOUS PRN
Status: DISCONTINUED | OUTPATIENT
Start: 2022-05-31 | End: 2022-06-01

## 2022-05-31 RX ORDER — NITROGLYCERIN 20 MG/100ML
10 INJECTION INTRAVENOUS CONTINUOUS PRN
Status: DISCONTINUED | OUTPATIENT
Start: 2022-05-31 | End: 2022-06-01

## 2022-05-31 RX ORDER — SODIUM CHLORIDE 9 MG/ML
INJECTION, SOLUTION INTRAVENOUS CONTINUOUS PRN
Status: DISCONTINUED | OUTPATIENT
Start: 2022-05-31 | End: 2022-05-31 | Stop reason: SDUPTHER

## 2022-05-31 RX ORDER — ALBUMIN, HUMAN INJ 5% 5 %
25 SOLUTION INTRAVENOUS PRN
Status: DISCONTINUED | OUTPATIENT
Start: 2022-05-31 | End: 2022-06-01

## 2022-05-31 RX ORDER — SODIUM CHLORIDE 0.9 % (FLUSH) 0.9 %
5-40 SYRINGE (ML) INJECTION PRN
Status: DISCONTINUED | OUTPATIENT
Start: 2022-05-31 | End: 2022-06-01 | Stop reason: SDUPTHER

## 2022-05-31 RX ORDER — LIDOCAINE HYDROCHLORIDE 20 MG/ML
INJECTION, SOLUTION EPIDURAL; INFILTRATION; INTRACAUDAL; PERINEURAL PRN
Status: DISCONTINUED | OUTPATIENT
Start: 2022-05-31 | End: 2022-05-31 | Stop reason: SDUPTHER

## 2022-05-31 RX ORDER — SODIUM CHLORIDE 9 MG/ML
INJECTION, SOLUTION INTRAVENOUS PRN
Status: DISCONTINUED | OUTPATIENT
Start: 2022-05-31 | End: 2022-05-31 | Stop reason: HOSPADM

## 2022-05-31 RX ORDER — GABAPENTIN 300 MG/1
300 CAPSULE ORAL 3 TIMES DAILY
Status: DISCONTINUED | OUTPATIENT
Start: 2022-05-31 | End: 2022-06-05 | Stop reason: HOSPADM

## 2022-05-31 RX ORDER — ATORVASTATIN CALCIUM 40 MG/1
40 TABLET, FILM COATED ORAL NIGHTLY
Status: DISCONTINUED | OUTPATIENT
Start: 2022-06-01 | End: 2022-06-05 | Stop reason: HOSPADM

## 2022-05-31 RX ORDER — ESMOLOL HYDROCHLORIDE 10 MG/ML
INJECTION INTRAVENOUS PRN
Status: DISCONTINUED | OUTPATIENT
Start: 2022-05-31 | End: 2022-05-31

## 2022-05-31 RX ORDER — LANOLIN ALCOHOL/MO/W.PET/CERES
400 CREAM (GRAM) TOPICAL 2 TIMES DAILY
Status: DISCONTINUED | OUTPATIENT
Start: 2022-06-01 | End: 2022-06-05 | Stop reason: HOSPADM

## 2022-05-31 RX ORDER — MIDAZOLAM HYDROCHLORIDE 1 MG/ML
INJECTION INTRAMUSCULAR; INTRAVENOUS PRN
Status: DISCONTINUED | OUTPATIENT
Start: 2022-05-31 | End: 2022-05-31 | Stop reason: SDUPTHER

## 2022-05-31 RX ORDER — INSULIN LISPRO 100 [IU]/ML
0-12 INJECTION, SOLUTION INTRAVENOUS; SUBCUTANEOUS
Status: DISCONTINUED | OUTPATIENT
Start: 2022-06-01 | End: 2022-06-05 | Stop reason: HOSPADM

## 2022-05-31 RX ORDER — LISINOPRIL 2.5 MG/1
2.5 TABLET ORAL
Status: DISCONTINUED | OUTPATIENT
Start: 2022-06-02 | End: 2022-06-05 | Stop reason: HOSPADM

## 2022-05-31 RX ORDER — FUROSEMIDE 10 MG/ML
40 INJECTION INTRAMUSCULAR; INTRAVENOUS
Status: ACTIVE | OUTPATIENT
Start: 2022-05-31 | End: 2022-05-31

## 2022-05-31 RX ORDER — SODIUM CHLORIDE 0.9 % (FLUSH) 0.9 %
5-40 SYRINGE (ML) INJECTION EVERY 12 HOURS SCHEDULED
Status: DISCONTINUED | OUTPATIENT
Start: 2022-05-31 | End: 2022-06-01 | Stop reason: SDUPTHER

## 2022-05-31 RX ORDER — KETOROLAC TROMETHAMINE 30 MG/ML
30 INJECTION, SOLUTION INTRAMUSCULAR; INTRAVENOUS ONCE
Status: COMPLETED | OUTPATIENT
Start: 2022-05-31 | End: 2022-05-31

## 2022-05-31 RX ORDER — POTASSIUM CHLORIDE 29.8 MG/ML
20 INJECTION INTRAVENOUS PRN
Status: DISCONTINUED | OUTPATIENT
Start: 2022-05-31 | End: 2022-06-05 | Stop reason: HOSPADM

## 2022-05-31 RX ORDER — CHLORHEXIDINE GLUCONATE 0.12 MG/ML
15 RINSE ORAL ONCE
Status: COMPLETED | OUTPATIENT
Start: 2022-05-31 | End: 2022-05-31

## 2022-05-31 RX ORDER — MEPERIDINE HYDROCHLORIDE 50 MG/ML
25 INJECTION INTRAMUSCULAR; INTRAVENOUS; SUBCUTANEOUS
Status: ACTIVE | OUTPATIENT
Start: 2022-05-31 | End: 2022-05-31

## 2022-05-31 RX ORDER — MAGNESIUM HYDROXIDE 1200 MG/15ML
LIQUID ORAL CONTINUOUS PRN
Status: COMPLETED | OUTPATIENT
Start: 2022-05-31 | End: 2022-05-31

## 2022-05-31 RX ORDER — FUROSEMIDE 10 MG/ML
40 INJECTION INTRAMUSCULAR; INTRAVENOUS 2 TIMES DAILY
Status: DISCONTINUED | OUTPATIENT
Start: 2022-06-01 | End: 2022-06-03

## 2022-05-31 RX ORDER — MIDAZOLAM HYDROCHLORIDE 1 MG/ML
1 INJECTION, SOLUTION INTRAMUSCULAR; INTRAVENOUS
Status: DISCONTINUED | OUTPATIENT
Start: 2022-05-31 | End: 2022-06-01

## 2022-05-31 RX ORDER — ONDANSETRON 4 MG/1
4 TABLET, ORALLY DISINTEGRATING ORAL EVERY 8 HOURS PRN
Status: DISCONTINUED | OUTPATIENT
Start: 2022-05-31 | End: 2022-06-05 | Stop reason: HOSPADM

## 2022-05-31 RX ORDER — HYDRALAZINE HYDROCHLORIDE 20 MG/ML
5 INJECTION INTRAMUSCULAR; INTRAVENOUS EVERY 5 MIN PRN
Status: DISCONTINUED | OUTPATIENT
Start: 2022-05-31 | End: 2022-06-05 | Stop reason: HOSPADM

## 2022-05-31 RX ORDER — KETOROLAC TROMETHAMINE 30 MG/ML
15 INJECTION, SOLUTION INTRAMUSCULAR; INTRAVENOUS EVERY 6 HOURS PRN
Status: DISCONTINUED | OUTPATIENT
Start: 2022-05-31 | End: 2022-06-05 | Stop reason: HOSPADM

## 2022-05-31 RX ORDER — FENTANYL CITRATE 50 UG/ML
INJECTION, SOLUTION INTRAMUSCULAR; INTRAVENOUS PRN
Status: DISCONTINUED | OUTPATIENT
Start: 2022-05-31 | End: 2022-05-31

## 2022-05-31 RX ADMIN — Medication 10 ML: at 20:49

## 2022-05-31 RX ADMIN — ESMOLOL HYDROCHLORIDE 10 MG: 10 INJECTION, SOLUTION INTRAVENOUS at 08:05

## 2022-05-31 RX ADMIN — MIDAZOLAM 2 MG: 1 INJECTION INTRAMUSCULAR; INTRAVENOUS at 06:45

## 2022-05-31 RX ADMIN — HYDROMORPHONE HYDROCHLORIDE 1 MG: 1 INJECTION, SOLUTION INTRAMUSCULAR; INTRAVENOUS; SUBCUTANEOUS at 08:05

## 2022-05-31 RX ADMIN — CEFAZOLIN SODIUM 3000 MG: 10 INJECTION, POWDER, FOR SOLUTION INTRAVENOUS at 18:51

## 2022-05-31 RX ADMIN — PROTAMINE SULFATE 350 MG: 10 INJECTION, SOLUTION INTRAVENOUS at 10:46

## 2022-05-31 RX ADMIN — Medication 3000 MG: at 10:47

## 2022-05-31 RX ADMIN — ETOMIDATE 20 MG: 2 INJECTION, SOLUTION INTRAVENOUS at 06:59

## 2022-05-31 RX ADMIN — LIDOCAINE HYDROCHLORIDE 60 MG: 20 INJECTION, SOLUTION EPIDURAL; INFILTRATION; INTRACAUDAL; PERINEURAL at 06:59

## 2022-05-31 RX ADMIN — SODIUM CHLORIDE, POTASSIUM CHLORIDE, SODIUM LACTATE AND CALCIUM CHLORIDE: 600; 310; 30; 20 INJECTION, SOLUTION INTRAVENOUS at 12:59

## 2022-05-31 RX ADMIN — VANCOMYCIN HYDROCHLORIDE 1500 MG: 1 INJECTION, POWDER, LYOPHILIZED, FOR SOLUTION INTRAVENOUS at 07:12

## 2022-05-31 RX ADMIN — MUPIROCIN: 20 OINTMENT TOPICAL at 14:37

## 2022-05-31 RX ADMIN — ROCURONIUM BROMIDE 50 MG: 10 INJECTION INTRAVENOUS at 11:19

## 2022-05-31 RX ADMIN — ROCURONIUM BROMIDE 100 MG: 10 INJECTION INTRAVENOUS at 06:59

## 2022-05-31 RX ADMIN — FENTANYL CITRATE 100 MCG: 50 INJECTION, SOLUTION INTRAMUSCULAR; INTRAVENOUS at 07:15

## 2022-05-31 RX ADMIN — PROTAMINE SULFATE 50 MG: 10 INJECTION, SOLUTION INTRAVENOUS at 10:58

## 2022-05-31 RX ADMIN — ROCURONIUM BROMIDE 50 MG: 10 INJECTION INTRAVENOUS at 09:59

## 2022-05-31 RX ADMIN — HEPARIN SODIUM 57000 UNITS: 1000 INJECTION INTRAVENOUS; SUBCUTANEOUS at 08:05

## 2022-05-31 RX ADMIN — ESMOLOL HYDROCHLORIDE 10 MG: 10 INJECTION, SOLUTION INTRAVENOUS at 08:13

## 2022-05-31 RX ADMIN — ESMOLOL HYDROCHLORIDE 10 MG: 10 INJECTION, SOLUTION INTRAVENOUS at 07:58

## 2022-05-31 RX ADMIN — FENTANYL CITRATE 100 MCG: 50 INJECTION, SOLUTION INTRAMUSCULAR; INTRAVENOUS at 06:46

## 2022-05-31 RX ADMIN — VANCOMYCIN HYDROCHLORIDE 2000 MG: 10 INJECTION, POWDER, LYOPHILIZED, FOR SOLUTION INTRAVENOUS at 20:48

## 2022-05-31 RX ADMIN — CHLORHEXIDINE GLUCONATE: 213 SOLUTION TOPICAL at 06:09

## 2022-05-31 RX ADMIN — MUPIROCIN: 20 OINTMENT TOPICAL at 20:51

## 2022-05-31 RX ADMIN — Medication 3000 MG: at 06:47

## 2022-05-31 RX ADMIN — HYDROMORPHONE HYDROCHLORIDE 1 MG: 1 INJECTION, SOLUTION INTRAMUSCULAR; INTRAVENOUS; SUBCUTANEOUS at 11:30

## 2022-05-31 RX ADMIN — ACETAMINOPHEN 1000 MG: 500 TABLET ORAL at 18:44

## 2022-05-31 RX ADMIN — POTASSIUM CHLORIDE 20 MEQ: 400 INJECTION, SOLUTION INTRAVENOUS at 13:01

## 2022-05-31 RX ADMIN — FENTANYL CITRATE 600 MCG: 50 INJECTION, SOLUTION INTRAMUSCULAR; INTRAVENOUS at 07:39

## 2022-05-31 RX ADMIN — FENTANYL CITRATE 100 MCG: 50 INJECTION, SOLUTION INTRAMUSCULAR; INTRAVENOUS at 06:58

## 2022-05-31 RX ADMIN — ALBUMIN (HUMAN) 25 G: 12.5 INJECTION, SOLUTION INTRAVENOUS at 14:16

## 2022-05-31 RX ADMIN — KETOROLAC TROMETHAMINE 30 MG: 30 INJECTION, SOLUTION INTRAMUSCULAR at 14:51

## 2022-05-31 RX ADMIN — SODIUM CHLORIDE 40 MG: 9 INJECTION, SOLUTION INTRAMUSCULAR; INTRAVENOUS; SUBCUTANEOUS at 14:37

## 2022-05-31 RX ADMIN — ROCURONIUM BROMIDE 50 MG: 10 INJECTION INTRAVENOUS at 10:31

## 2022-05-31 RX ADMIN — GABAPENTIN 300 MG: 300 CAPSULE ORAL at 20:57

## 2022-05-31 RX ADMIN — SODIUM CHLORIDE 0.1 MCG/KG/HR: 9 INJECTION, SOLUTION INTRAVENOUS at 18:33

## 2022-05-31 RX ADMIN — FENTANYL CITRATE 100 MCG: 50 INJECTION, SOLUTION INTRAMUSCULAR; INTRAVENOUS at 07:06

## 2022-05-31 RX ADMIN — SODIUM CHLORIDE 3.36 UNITS/HR: 900 INJECTION, SOLUTION INTRAVENOUS at 13:00

## 2022-05-31 RX ADMIN — SODIUM CHLORIDE, POTASSIUM CHLORIDE, SODIUM LACTATE AND CALCIUM CHLORIDE: 600; 310; 30; 20 INJECTION, SOLUTION INTRAVENOUS at 06:25

## 2022-05-31 RX ADMIN — Medication 15 ML: at 06:07

## 2022-05-31 RX ADMIN — MUPIROCIN: 20 OINTMENT TOPICAL at 06:09

## 2022-05-31 RX ADMIN — SODIUM CHLORIDE: 9 INJECTION, SOLUTION INTRAVENOUS at 06:44

## 2022-05-31 RX ADMIN — Medication 15 ML: at 20:57

## 2022-05-31 RX ADMIN — MIDAZOLAM 3 MG: 1 INJECTION INTRAMUSCULAR; INTRAVENOUS at 09:59

## 2022-05-31 RX ADMIN — ALBUMIN (HUMAN) 25 G: 12.5 INJECTION, SOLUTION INTRAVENOUS at 13:15

## 2022-05-31 ASSESSMENT — PULMONARY FUNCTION TESTS
PIF_VALUE: 20
PIF_VALUE: 15
PIF_VALUE: 21
PIF_VALUE: 20
PIF_VALUE: 22
PIF_VALUE: 20
PIF_VALUE: 17
PIF_VALUE: 15
PIF_VALUE: 16
PIF_VALUE: 19
PIF_VALUE: 18
PIF_VALUE: 19
PIF_VALUE: 17
PIF_VALUE: 15
PIF_VALUE: 15
PIF_VALUE: 22
PIF_VALUE: 15
PIF_VALUE: 22
PIF_VALUE: 18
PIF_VALUE: 14
PIF_VALUE: 15
PIF_VALUE: 18
PIF_VALUE: 22
PIF_VALUE: 21
PIF_VALUE: 21

## 2022-05-31 ASSESSMENT — PAIN SCALES - GENERAL
PAINLEVEL_OUTOF10: 6
PAINLEVEL_OUTOF10: 8

## 2022-05-31 ASSESSMENT — PAIN - FUNCTIONAL ASSESSMENT
PAIN_FUNCTIONAL_ASSESSMENT: ACTIVITIES ARE NOT PREVENTED
PAIN_FUNCTIONAL_ASSESSMENT: 0-10

## 2022-05-31 ASSESSMENT — PAIN DESCRIPTION - LOCATION: LOCATION: CHEST

## 2022-05-31 ASSESSMENT — PAIN DESCRIPTION - ORIENTATION: ORIENTATION: MID

## 2022-05-31 ASSESSMENT — ENCOUNTER SYMPTOMS: DYSPNEA ACTIVITY LEVEL: AFTER AMBULATING 1 FLIGHT OF STAIRS

## 2022-05-31 ASSESSMENT — PAIN DESCRIPTION - DESCRIPTORS: DESCRIPTORS: ACHING

## 2022-05-31 ASSESSMENT — LIFESTYLE VARIABLES: SMOKING_STATUS: 1

## 2022-05-31 NOTE — PROGRESS NOTES
MECHANICAL VENTILATION WEANING PROTOCOL    PRE-TRIAL PATIENT ASSESSMENT - COMPLETED AT 1702    Ventilatory Assessment:    PARAMETER CRITERIA FOR WEANING   Spontaneous Cough:  Yes    Sputum Characteristics:  ·    ·    ·   SPONTANEOUS COUGH With small to moderate  Amount of secretions   FiO2 : 40 % FIO2 less than or equal to 50%     PEEP less than or equal to 8   Progressive Mobility Protocol  No     ABG:  Lab Results   Component Value Date    PHART 7.333 05/31/2022    RZP1YZP 41.6 05/31/2022    PO2ART 112.9 05/31/2022    N4MNJBMB 98 05/31/2022    LQT0QRW 22.1 05/31/2022    BEART -4 05/31/2022     HGB/WBC:  Lab Results   Component Value Date    HGB 11.2 05/31/2022    WBC 20.7 05/31/2022        Vital Signs:    PARAMETER CRITERIA FOR WEANING Meets Criteria   Heart Rate: 79 Within patient's normal limits / stable Yes   Resp: 20 Less than or equal to 30 Yes   BP: 136/87 Within patient's normal limits / minimal pressors (Hemodynamically Stable) Yes   SpO2: 100 % Greater than or equal to 90% Yes   End Tidal CO2: 34 (%) Within patient's normal limits Yes   Temp: 98.8 °F (37.1 °C) Less than 38. 5oC / 101. 3oF Yes     [x]    Based on this assessment and the [facility] Ventilator Weaning Protocol, this patient  IS being placed on a Spontaneous Breathing Trial (SBT) at this time.  []    Based on this assessment and the [facility] Ventilator Weaning Protocol, this patient  IS NOT being placed on a Spontaneous Breathing Trial (SBT) at this time. []    Patient  IS NOT being placed on a Spontaneous Breathing Trial (SBT) at this time because of factors not previously addressed.   Those factors include      Vital Capacity (VC) = 1259    Maximum Inspiratory Force (MIF) = <-24    SBT - Initiated at  1702    Ventilator Settings:  CPAP - 5 cmH2O, PS - 10 cmH2O(if using settings other than CPAP 5/PS 8, please explain reasons for settings here):       1 Minute SBT Respiratory Parameters:   VE: 9.6 L   RR: 12 b/m   VT: 800 mL (average VT = VE/RR)   RSBI: 15 (RR/VT in liters)   ETCO2: 35 cmH2O   SPO2: 100 %   If on sedation, amount and type none    [x]   RR is less than 35, RSBI is less than 100, patient's vitals signs are stable, and patient is in no apparent distress; therefore, patient is being left on SBT for up to 1 hour. []   RR is greater than 35, RSBI is greater than 100, VS's are unstable, or patient is in distress; therefore, patient is being placed back on previous settings. SBT - Concluded at  1800. Weaning Parameters/VS's at conclusion of SBT:   VE: 10.1 L   RR: 15 b/m   VT: 673 mL (average VT = VE/RR)   RSBI: 22 (RR/VT in liters)   ETCO2: 34 cmH2O   SPO2: 99 %    If on sedation, amount and type none    [x]   Patient tolerated SBT for full 60 minutes with acceptable weaning parameters and vital signs and showed no signs or distress. []   Patient tolerated SBT for full 60 minutes, but had unacceptable weaning parameters or vital signs, and/or signs of distress. []   Patient was unable to tolerate SBT for 60 minutes and was placed back on previous settings.             COMMENTS:

## 2022-05-31 NOTE — BRIEF OP NOTE
Pre-op Dx:AS/AI; class 3 systolic and diastolic heart failure, chronic; single vessel CAD;morbid obesity; paroxysmal afib; HTN; pre-op anemia      Post-op Dx:same; acute blood loss anemia      Procedure:SENA; TCPB; AVR w/ 25 mm Pereyra Inspiris Resilia bovine pericardial bioprosthesis; EVH  R calf; CABG x 1, SVG to PDA; sternal plates (Unomy) x 2; 35 mm Atriclip 2 to base of L atrial appendage      Anesthesia:General endotracheal anesthesia      Surgeon/Assistants:Stephany/Una      Specimens:aortic valve leaflets and annular calcium      EBL:autotransfusion      Complications: none      Findings:thickened, calcified tricuspid aortic valve; zero perivalvular leak post op with EF 45+%

## 2022-05-31 NOTE — ANESTHESIA PROCEDURE NOTES
Central Venous Line:    A central venous line was placed using ultrasound guidance, in the OR for the following indication(s): central venous access and CVP monitoring.5/31/2022 7:15 AM5/31/2022 7:21 AM    Sterility preparation included the following: hand hygiene performed prior to procedure, maximum sterile barriers used and sterile technique used to drape from head to toe. The patient was placed in Trendelenburg position. The right internal jugular vein was prepped. The site was prepped with Chloraprep. A 8.5 Fr (size), 12 (length), introducer double lumen was placed. During the procedure, the following specific steps were taken: target vein identified, needle advanced into vein and blood aspirated and guidewire advanced into vein. Intravenous verification was obtained by ultrasound and venous blood return. Post insertion care included: all ports aspirated, all ports flushed easily, guidewire removed intact, Biopatch applied, line sutured in place and dressing applied. During the procedure the patient experienced: patient tolerated procedure well with no complications and EBL < 5mL. Outcomes: uncomplicatedno permanent images obtained  Anesthesia type: generalA(n) oximetric, 7.5 (size) Pulmonary Artery Catheter (PAC) was placed through the Introducer CVL in the right internal jugular vein. The PAC placement was confirmed by pressure tracing changes and secured at 52 cm (depth). The patient experienced the following events during the procedure: patient tolerated procedure well with no complications. PA Cath placed?: Yes  Staffing  Performed: Anesthesiologist   Anesthesiologist: Dale Newman DO  Preanesthetic Checklist  Completed: patient identified, IV checked, site marked, risks and benefits discussed, surgical/procedural consents, equipment checked, pre-op evaluation, timeout performed, anesthesia consent given, oxygen available, monitors applied/VS acknowledged, fire risk safety assessment completed and verbalized and blood product R/B/A discussed and consented
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
## 2022-05-31 NOTE — PROGRESS NOTES
Prior to shower over all body clip per Soumya Tenorio.   (Back, buttocks, privates and right arm not clipped)

## 2022-05-31 NOTE — PLAN OF CARE
Problem: Chronic Conditions and Co-morbidities  Goal: Patient's chronic conditions and co-morbidity symptoms are monitored and maintained or improved  5/31/2022 1925 by Yasmin Heard RN  Outcome: Progressing     Problem: Discharge Planning  Goal: Discharge to home or other facility with appropriate resources  5/31/2022 1925 by Yasmin Heard RN  Outcome: Progressing     Problem: Pain  Goal: Verbalizes/displays adequate comfort level or baseline comfort level  5/31/2022 1925 by Yasmin Heard RN  Outcome: Progressing     Problem: Safety - Medical Restraint  Goal: Remains free of injury from restraints (Restraint for Interference with Medical Device)  Description: INTERVENTIONS:  1. Determine that other, less restrictive measures have been tried or would not be effective before applying the restraint  2. Evaluate the patient's condition at the time of restraint application  3. Inform patient/family regarding the reason for restraint  4.  Q2H: Monitor safety, psychosocial status, comfort, nutrition and hydration  5/31/2022 1925 by Yasmin Heard RN  Outcome: Progressing

## 2022-05-31 NOTE — PROGRESS NOTES
Pt admitted up to ICU from [address] [place] [address]. Report received from Anesthesia. Pt placed on central monitor. Hypertensive at this time and Nitro gtt restarted. RT in room and placed pt on ventilator at this time. Art line and PA catheter zeroed and re calibrated. Chest tubed placed on continuous suction at -20 cmH2O. Labs drawn. Diagnostics notified for Chest X ray. Family notified of admission from OR to ICU.      Electronically signed by Nick Denson RN on 5/31/2022 at 3:59 PM

## 2022-05-31 NOTE — PROGRESS NOTES
4 Eyes Skin Assessment     The patient is being assess for  Admission    I agree that 2 RN's have performed a thorough Head to Toe Skin Assessment on the patient. ALL assessment sites listed below have been assessed. Areas assessed by both nurses:   [x]   Head, Face, and Ears   [x]   Shoulders, Back, and Chest  [x]   Arms, Elbows, and Hands   [x]   Coccyx, Sacrum, and IschIum  [x]   Legs, Feet, and Heels        Does the Patient have Skin Breakdown?   No         Jourdan Prevention initiated:  Yes   Wound Care Orders initiated:  No      [facility] nurse consulted for Pressure Injury (Stage 3,4, Unstageable, DTI, NWPT, and Complex wounds), New and Established Ostomies:  No      Nurse 1 eSignature: Electronically signed by Hi Diaz RN on 12/25/19 at 11:42 PM    **SHARE this note so that the co-signing nurse is able to place an eSignature**    Nurse 2 eSignature: Electronically signed by Alphonse Salazar RN on 12/26/19 at 2:37 AM T&S and Lipids to lab at 0630.

## 2022-05-31 NOTE — ANESTHESIA PROCEDURE NOTES
Procedure Performed: SENA       Start Time:  5/31/2022 7:27 AM       End Time:   5/31/2022 1:02 PM    Preanesthesia Checklist:  Patient identified, IV assessed, risks and benefits discussed, monitors and equipment assessed, procedure being performed at surgeon's request and anesthesia consent obtained. General Procedure Information  Diagnostic Indications for Echo:  assessment of ascending aorta, assessment of surgical repair, hemodynamic monitoring and assessment of valve function  Physician Requesting Echo: Davina Thomson MD  Location performed:  OR  Intubated  Bite block not placed  Heart visualized  Probe Insertion:  Easy  Probe Type:  Biplane  Modalities:  2D only, continuous wave Doppler and pulse wave Doppler    Echocardiographic and Doppler Measurements    Ventricles    Right Ventricle:  Cavity size dilated. Hypertrophy present. Thrombus not present. Global function moderately impaired. Ejection Fraction 30%. Left Ventricle:  Cavity size dilated. Hypertrophy present. Thrombus not present. Global Function moderately impaired. Ejection Fraction 30%. Ventricular Regional Function:  1- Basal Anteroseptal:  hypokinetic  2- Basal Anterior:  hypokinetic  3- Basal Anterolateral:  hypokinetic  4- Basal Inferolateral:  hypokinetic  5- Basal Inferior:  hypokinetic  6- Basal Inferoseptal:  hypokinetic  7- Mid Anteroseptal:  hypokinetic  8- Mid Anterior:  hypokinetic  9- Mid Anterolateral:  hypokinetic  10- Mid Inferolateral:  hypokinetic  11- Mid Inferior:  hypokinetic  12- Mid Inferoseptal:  hypokinetic  13- Apical Anterior:  hypokinetic  14- Apical Lateral:  hypokinetic  15- Apical Inferior:  hypokinetic  16- Apical Septal:  hypokinetic  17- [place]:  hypokinetic      Valves    Aortic Valve: Annulus calcified. Stenosis severe. Area: .45 cm². Regurgitation none. Leaflets calcified. Leaflet motions restricted.   Specific leaflet segments with abnormal motions are described in the following comments:       all    Mitral Valve: Annulus normal.  Stenosis not present. Regurgitation none. Leaflets normal.  Leaflet motions normal.      Tricuspid Valve: Annulus normal.  Stenosis not present. Regurgitation none. Leaflet motions normal.          Aorta    Ascending Aorta:  Size normal.    Aortic Arch:  Size normal.    Descending Aorta:  Size normal.          Atria    Right Atrium:  Size dilated. Thrombus not present. Tumor not present. Left Atrium:  Size dilated. Thrombus not present. Tumor not present. Septa    Atrial Septum:  Intra-atrial septal morphology normal.      Ventricular Septum:  Intra-ventricular septum morphology hypertrophy. Other Findings  Pericardium:  normal      Anesthesia Information  Performed Personally  Anesthesiologist:  Emily Fam DO    Post Intervention Follow-up Study  Aortic Function: improved0. 45Mitral Function: unchangedTricuspid Function: unchangedNone
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
## 2022-05-31 NOTE — PROGRESS NOTES
Pt successfully weaned from mechanical ventilation at approximately 6:10pm. Pt placed on 3L NC w/ normal depth and regular respirations. Mouth suctioned with just clear thin secretions cleared. Lungs still sound diminished bilaterally. Will continue to monitor respiratory status closely.      Electronically signed by Suzy Stratton RN on 5/31/2022 at 6:58 PM

## 2022-05-31 NOTE — ANESTHESIA PROCEDURE NOTES
Arterial Line:    An arterial line was placed using surface landmarks, in the OR for the following indication(s): continuous blood pressure monitoring and blood sampling needed. A 20 gauge (size), 4.45 cm (length), Arrow (type) catheter was placed, Seldinger technique used, into the right brachial artery, secured by suture, tape and Tegaderm and biopatch. Anesthesia type: General    Events:  patient tolerated procedure well with no complications and EBL < 5mL. 5/31/2022 6:55 AM5/31/2022 6:58 AM  Anesthesiologist: Rani Mack DO  Performed: Anesthesiologist   Preanesthetic Checklist  Completed: patient identified, IV checked, site marked, risks and benefits discussed, surgical/procedural consents, equipment checked, pre-op evaluation, timeout performed, anesthesia consent given, oxygen available, monitors applied/VS acknowledged, fire risk safety assessment completed and verbalized and blood product R/B/A discussed and consented

## 2022-05-31 NOTE — ANESTHESIA PROCEDURE NOTES
Peripheral Block    Patient location during procedure: OR  Start time: 5/31/2022 12:08 PM  End time: 5/31/2022 12:12 PM  Staffing  Performed: anesthesiologist   Anesthesiologist: Arely Siu DO  Preanesthetic Checklist  Completed: patient identified, IV checked, site marked, risks and benefits discussed, surgical/procedural consents, equipment checked, pre-op evaluation, timeout performed, anesthesia consent given, oxygen available, monitors applied/VS acknowledged, fire risk safety assessment completed and verbalized and blood product R/B/A discussed and consented  Peripheral Block  Patient position: supine  Prep: ChloraPrep  Patient monitoring: cardiac monitor, continuous pulse ox, continuous capnometry, frequent blood pressure checks, IV access, oxygen and responsive to questions  Block type: PECS II  Laterality: bilateral  Injection technique: single-shot  Guidance: ultrasound guided  Provider prep: sterile gloves and mask  Needle  Needle type: insulated echogenic nerve stimulator needle   Needle gauge: 20 G  Needle length: 8 cm  Needle localization: anatomical landmarks and ultrasound guidance  Needle insertion depth: 3 cm  Test dose: negative  Assessment  Injection assessment: negative aspiration for heme, no intravascular symptoms and no paresthesia on injection  Paresthesia pain: none  Slow fractionated injection: yes  Hemodynamics: stablepermanent images obtainedOutcomes: uncomplicated  Additional Notes  Prior informed consent:    Sterile prep/drape  US guided pec 2 block  + ID facial plane between major/minor  Neg aspiration/  0.5% marcaine 10 cc and exparel 10 cc   Divided doses:  5 cc increments  Repeated opposite side  Pt.  Tolerated well vss  Reason for block: post-op pain management and at surgeon's request

## 2022-05-31 NOTE — PROGRESS NOTES
Pt currently still intubated and sedated from general anesthesia. Pt able to open eyes to voice, nod appropriately to questions and follow commands in all extremities. Vent set at 40% fiO2, PEEP 5, Vt 575 and rate set at 20 bpm. Pt breathing currently at set rate still. Latest ABG:   Results for Davy Rodgers (MRN [id number]) as of 5/31/2022 15:41   Ref. Range 5/31/2022 15:06   pH, Arterial Latest Ref Range: 7.350 - 7.450  7.307 (L)   pCO2, Arterial Latest Ref Range: 35.0 - 45.0 mm Hg 45.7 (H)   pO2, Arterial Latest Ref Range: 75.0 - 108.0 mm Hg 149.6 (H)   HCO3, Arterial Latest Ref Range: 21.0 - 29.0 mmol/L 22.8   TCO2 (calc), Art Latest Ref Range: Not Established mmol/L 24   Base Excess, Arterial Latest Ref Range: -3 - 3  -4 (L)   O2 Sat, Arterial Latest Ref Range: 93 - 100 % 99     Will continue to wean vent settings and extubated per protocol. Pt currently off vasoactive gtts at this time. Sys BP maintaining high 90s to low 100s. CI maintaining around 2.1-2.3. CCO 5.4. PAD maintaining above 18 after 1L albumin given. CI improved afterwards as well. HR has maintaining NSR w/ rate in 70-80s. No ectopy noted thus far. 20 mEq of K replaced per protocol. UO has averaged 125-150 ml/hr. CT output total 55 ml.      Electronically signed by Humphrey Adame RN on 5/31/2022 at 3:46 PM

## 2022-05-31 NOTE — PROGRESS NOTES
Mechanical ventilation wean initiated.     Electronically signed by Mecca Cagle RN on 5/31/2022 at 5:38 PM no

## 2022-05-31 NOTE — RT PROTOCOL NOTE
RT Nebulizer Bronchodilator Protocol Note    There is a bronchodilator order in the chart from a provider indicating to follow the RT Bronchodilator Protocol and there is an Initiate RT Bronchodilator Protocol order as well (see protocol at bottom of note). CXR Findings:  XR CHEST PORTABLE    Result Date: 5/31/2022  Coarse consolidation throughout the left lung. Linear atelectasis or scarring the right lung base. No pneumothorax. Cardiomegaly status post sternotomy. Lines and tubes in standard position. Millheim-Amarilys catheter tip is in the pulmonary outflow tract. The findings from the last RT Protocol Assessment were as follows:  Smoking: Smoker 15 pack years or more  Respiratory Pattern: Regular pattern and RR 12-20 bpm  Breath Sounds: Clear breath sounds  Cough: Strong, spontaneous, non-productive  Indication for Bronchodilator Therapy:    Bronchodilator Assessment Score: 1    Aerosolized bronchodilator medication orders have been revised according to the RT Nebulizer Bronchodilator Protocol below. Respiratory Therapist to perform RT Therapy Protocol Assessment initially then follow the protocol. Repeat RT Therapy Protocol Assessment PRN for score 0-3 or on second treatment, BID, and PRN for scores above 3. No Indications - adjust the frequency to every 6 hours PRN wheezing or bronchospasm, if no treatments needed after 48 hours then discontinue using Per Protocol order mode. If indication present, adjust the RT bronchodilator orders based on the Bronchodilator Assessment Score as indicated below. If a patient is on this medication at home then do not decrease Frequency below that used at home. 0-3 - enter or revise RT bronchodilator order(s) to equivalent RT Bronchodilator order with Frequency of every 4 hours PRN for wheezing or increased work of breathing using Per Protocol order mode.        4-6 - enter or revise RT Bronchodilator order(s) to two equivalent RT bronchodilator orders with

## 2022-05-31 NOTE — PROGRESS NOTES
Veins right hand/ forearm small or not straight. 20 J used. Family in prior to going to OR for prayer.

## 2022-06-01 LAB
ANION GAP SERPL CALCULATED.3IONS-SCNC: 10 MMOL/L (ref 3–16)
BUN BLDV-MCNC: 22 MG/DL (ref 7–20)
CALCIUM SERPL-MCNC: 8.3 MG/DL (ref 8.3–10.6)
CHLORIDE BLD-SCNC: 106 MMOL/L (ref 99–110)
CO2: 21 MMOL/L (ref 21–32)
CREAT SERPL-MCNC: 1.1 MG/DL (ref 0.8–1.3)
GFR AFRICAN AMERICAN: >60
GFR NON-AFRICAN AMERICAN: >60
GLUCOSE BLD-MCNC: 102 MG/DL (ref 70–99)
GLUCOSE BLD-MCNC: 122 MG/DL (ref 70–99)
GLUCOSE BLD-MCNC: 125 MG/DL (ref 70–99)
GLUCOSE BLD-MCNC: 126 MG/DL (ref 70–99)
GLUCOSE BLD-MCNC: 127 MG/DL (ref 70–99)
GLUCOSE BLD-MCNC: 130 MG/DL (ref 70–99)
GLUCOSE BLD-MCNC: 135 MG/DL (ref 70–99)
GLUCOSE BLD-MCNC: 136 MG/DL (ref 70–99)
GLUCOSE BLD-MCNC: 139 MG/DL (ref 70–99)
GLUCOSE BLD-MCNC: 141 MG/DL (ref 70–99)
GLUCOSE BLD-MCNC: 144 MG/DL (ref 70–99)
GLUCOSE BLD-MCNC: 147 MG/DL (ref 70–99)
GLUCOSE BLD-MCNC: 150 MG/DL (ref 70–99)
GLUCOSE BLD-MCNC: 161 MG/DL (ref 70–99)
GLUCOSE BLD-MCNC: 165 MG/DL (ref 70–99)
GLUCOSE BLD-MCNC: 99 MG/DL (ref 70–99)
HCT VFR BLD CALC: 26.9 % (ref 40.5–52.5)
HEMOGLOBIN: 9 G/DL (ref 13.5–17.5)
INR BLD: 1.15 (ref 0.87–1.14)
MAGNESIUM: 2.3 MG/DL (ref 1.8–2.4)
MCH RBC QN AUTO: 29.3 PG (ref 26–34)
MCHC RBC AUTO-ENTMCNC: 33.6 G/DL (ref 31–36)
MCV RBC AUTO: 87.1 FL (ref 80–100)
PDW BLD-RTO: 14.9 % (ref 12.4–15.4)
PERFORMED ON: ABNORMAL
PERFORMED ON: NORMAL
PLATELET # BLD: 120 K/UL (ref 135–450)
PMV BLD AUTO: 8.3 FL (ref 5–10.5)
POTASSIUM SERPL-SCNC: 4.7 MMOL/L (ref 3.5–5.1)
PROTHROMBIN TIME: 14.6 SEC (ref 11.7–14.5)
RBC # BLD: 3.09 M/UL (ref 4.2–5.9)
SODIUM BLD-SCNC: 137 MMOL/L (ref 136–145)
WBC # BLD: 11.3 K/UL (ref 4–11)

## 2022-06-01 PROCEDURE — 80048 BASIC METABOLIC PNL TOTAL CA: CPT

## 2022-06-01 PROCEDURE — 2580000003 HC RX 258: Performed by: THORACIC SURGERY (CARDIOTHORACIC VASCULAR SURGERY)

## 2022-06-01 PROCEDURE — 6370000000 HC RX 637 (ALT 250 FOR IP): Performed by: THORACIC SURGERY (CARDIOTHORACIC VASCULAR SURGERY)

## 2022-06-01 PROCEDURE — 6360000002 HC RX W HCPCS: Performed by: THORACIC SURGERY (CARDIOTHORACIC VASCULAR SURGERY)

## 2022-06-01 PROCEDURE — 97166 OT EVAL MOD COMPLEX 45 MIN: CPT

## 2022-06-01 PROCEDURE — 97530 THERAPEUTIC ACTIVITIES: CPT

## 2022-06-01 PROCEDURE — 83735 ASSAY OF MAGNESIUM: CPT

## 2022-06-01 PROCEDURE — 97535 SELF CARE MNGMENT TRAINING: CPT

## 2022-06-01 PROCEDURE — 6370000000 HC RX 637 (ALT 250 FOR IP): Performed by: CLINICAL NURSE SPECIALIST

## 2022-06-01 PROCEDURE — 2580000003 HC RX 258: Performed by: INTERNAL MEDICINE

## 2022-06-01 PROCEDURE — 97162 PT EVAL MOD COMPLEX 30 MIN: CPT

## 2022-06-01 PROCEDURE — 2000000000 HC ICU R&B

## 2022-06-01 PROCEDURE — C9113 INJ PANTOPRAZOLE SODIUM, VIA: HCPCS | Performed by: THORACIC SURGERY (CARDIOTHORACIC VASCULAR SURGERY)

## 2022-06-01 PROCEDURE — 97116 GAIT TRAINING THERAPY: CPT

## 2022-06-01 PROCEDURE — A4216 STERILE WATER/SALINE, 10 ML: HCPCS | Performed by: THORACIC SURGERY (CARDIOTHORACIC VASCULAR SURGERY)

## 2022-06-01 PROCEDURE — 85027 COMPLETE CBC AUTOMATED: CPT

## 2022-06-01 PROCEDURE — 85610 PROTHROMBIN TIME: CPT

## 2022-06-01 RX ORDER — ACETAMINOPHEN 325 MG/1
650 TABLET ORAL EVERY 4 HOURS PRN
Status: DISCONTINUED | OUTPATIENT
Start: 2022-06-01 | End: 2022-06-01

## 2022-06-01 RX ORDER — OXYCODONE HYDROCHLORIDE 5 MG/1
10 TABLET ORAL EVERY 4 HOURS PRN
Status: DISCONTINUED | OUTPATIENT
Start: 2022-06-01 | End: 2022-06-05 | Stop reason: HOSPADM

## 2022-06-01 RX ORDER — SODIUM CHLORIDE 0.9 % (FLUSH) 0.9 %
5-40 SYRINGE (ML) INJECTION PRN
Status: DISCONTINUED | OUTPATIENT
Start: 2022-06-01 | End: 2022-06-05 | Stop reason: HOSPADM

## 2022-06-01 RX ORDER — SODIUM CHLORIDE 9 MG/ML
INJECTION, SOLUTION INTRAVENOUS CONTINUOUS
Status: DISCONTINUED | OUTPATIENT
Start: 2022-06-01 | End: 2022-06-01 | Stop reason: SDUPTHER

## 2022-06-01 RX ORDER — SODIUM CHLORIDE 0.9 % (FLUSH) 0.9 %
5-40 SYRINGE (ML) INJECTION EVERY 12 HOURS SCHEDULED
Status: DISCONTINUED | OUTPATIENT
Start: 2022-06-01 | End: 2022-06-05 | Stop reason: HOSPADM

## 2022-06-01 RX ORDER — SODIUM CHLORIDE 9 MG/ML
INJECTION, SOLUTION INTRAVENOUS CONTINUOUS
Status: DISCONTINUED | OUTPATIENT
Start: 2022-06-01 | End: 2022-06-01

## 2022-06-01 RX ORDER — OXYCODONE HYDROCHLORIDE 5 MG/1
5 TABLET ORAL EVERY 4 HOURS PRN
Status: DISCONTINUED | OUTPATIENT
Start: 2022-06-01 | End: 2022-06-05 | Stop reason: HOSPADM

## 2022-06-01 RX ORDER — LANOLIN ALCOHOL/MO/W.PET/CERES
6 CREAM (GRAM) TOPICAL NIGHTLY PRN
Status: DISCONTINUED | OUTPATIENT
Start: 2022-06-01 | End: 2022-06-05 | Stop reason: HOSPADM

## 2022-06-01 RX ORDER — SODIUM CHLORIDE 9 MG/ML
25 INJECTION, SOLUTION INTRAVENOUS PRN
Status: DISCONTINUED | OUTPATIENT
Start: 2022-06-01 | End: 2022-06-01

## 2022-06-01 RX ADMIN — CEFAZOLIN SODIUM 3000 MG: 10 INJECTION, POWDER, FOR SOLUTION INTRAVENOUS at 18:22

## 2022-06-01 RX ADMIN — ACETAMINOPHEN 1000 MG: 500 TABLET ORAL at 14:21

## 2022-06-01 RX ADMIN — Medication 15 ML: at 21:02

## 2022-06-01 RX ADMIN — POTASSIUM CHLORIDE 10 MEQ: 750 TABLET, EXTENDED RELEASE ORAL at 17:11

## 2022-06-01 RX ADMIN — POTASSIUM CHLORIDE 10 MEQ: 750 TABLET, EXTENDED RELEASE ORAL at 14:21

## 2022-06-01 RX ADMIN — KETOROLAC TROMETHAMINE 15 MG: 30 INJECTION, SOLUTION INTRAMUSCULAR; INTRAVENOUS at 18:34

## 2022-06-01 RX ADMIN — SODIUM CHLORIDE 40 MG: 9 INJECTION, SOLUTION INTRAMUSCULAR; INTRAVENOUS; SUBCUTANEOUS at 08:00

## 2022-06-01 RX ADMIN — CEFAZOLIN SODIUM 3000 MG: 10 INJECTION, POWDER, FOR SOLUTION INTRAVENOUS at 04:22

## 2022-06-01 RX ADMIN — Medication 400 MG: at 08:01

## 2022-06-01 RX ADMIN — METOPROLOL TARTRATE 12.5 MG: 25 TABLET, FILM COATED ORAL at 08:00

## 2022-06-01 RX ADMIN — OXYCODONE 10 MG: 5 TABLET ORAL at 17:10

## 2022-06-01 RX ADMIN — MUPIROCIN: 20 OINTMENT TOPICAL at 08:01

## 2022-06-01 RX ADMIN — ASPIRIN 325 MG: 325 TABLET, COATED ORAL at 08:00

## 2022-06-01 RX ADMIN — VANCOMYCIN HYDROCHLORIDE 2000 MG: 10 INJECTION, POWDER, LYOPHILIZED, FOR SOLUTION INTRAVENOUS at 06:44

## 2022-06-01 RX ADMIN — FUROSEMIDE 40 MG: 10 INJECTION, SOLUTION INTRAMUSCULAR; INTRAVENOUS at 18:26

## 2022-06-01 RX ADMIN — CEFAZOLIN SODIUM 3000 MG: 10 INJECTION, POWDER, FOR SOLUTION INTRAVENOUS at 11:30

## 2022-06-01 RX ADMIN — GABAPENTIN 300 MG: 300 CAPSULE ORAL at 08:01

## 2022-06-01 RX ADMIN — Medication 15 ML: at 08:00

## 2022-06-01 RX ADMIN — METOPROLOL TARTRATE 12.5 MG: 25 TABLET, FILM COATED ORAL at 21:02

## 2022-06-01 RX ADMIN — SODIUM CHLORIDE, POTASSIUM CHLORIDE, SODIUM LACTATE AND CALCIUM CHLORIDE: 600; 310; 30; 20 INJECTION, SOLUTION INTRAVENOUS at 04:21

## 2022-06-01 RX ADMIN — KETOROLAC TROMETHAMINE 15 MG: 30 INJECTION, SOLUTION INTRAMUSCULAR; INTRAVENOUS at 04:36

## 2022-06-01 RX ADMIN — INSULIN GLARGINE 21 UNITS: 100 INJECTION, SOLUTION SUBCUTANEOUS at 21:01

## 2022-06-01 RX ADMIN — SODIUM CHLORIDE, PRESERVATIVE FREE 10 ML: 5 INJECTION INTRAVENOUS at 21:04

## 2022-06-01 RX ADMIN — VANCOMYCIN HYDROCHLORIDE 2000 MG: 10 INJECTION, POWDER, LYOPHILIZED, FOR SOLUTION INTRAVENOUS at 19:15

## 2022-06-01 RX ADMIN — CLOPIDOGREL BISULFATE 75 MG: 75 TABLET ORAL at 08:01

## 2022-06-01 RX ADMIN — POTASSIUM CHLORIDE 10 MEQ: 750 TABLET, EXTENDED RELEASE ORAL at 08:00

## 2022-06-01 RX ADMIN — ATORVASTATIN CALCIUM 40 MG: 40 TABLET, FILM COATED ORAL at 21:02

## 2022-06-01 RX ADMIN — KETOROLAC TROMETHAMINE 15 MG: 30 INJECTION, SOLUTION INTRAMUSCULAR; INTRAVENOUS at 11:27

## 2022-06-01 RX ADMIN — ACETAMINOPHEN 1000 MG: 500 TABLET ORAL at 07:04

## 2022-06-01 RX ADMIN — FUROSEMIDE 40 MG: 10 INJECTION, SOLUTION INTRAMUSCULAR; INTRAVENOUS at 08:00

## 2022-06-01 RX ADMIN — Medication 10 ML: at 08:00

## 2022-06-01 RX ADMIN — GABAPENTIN 300 MG: 300 CAPSULE ORAL at 21:02

## 2022-06-01 RX ADMIN — Medication 400 MG: at 21:02

## 2022-06-01 RX ADMIN — GABAPENTIN 300 MG: 300 CAPSULE ORAL at 14:21

## 2022-06-01 RX ADMIN — ACETAMINOPHEN 1000 MG: 500 TABLET ORAL at 19:11

## 2022-06-01 RX ADMIN — MUPIROCIN: 20 OINTMENT TOPICAL at 21:04

## 2022-06-01 RX ADMIN — ACETAMINOPHEN 1000 MG: 500 TABLET ORAL at 01:47

## 2022-06-01 ASSESSMENT — PAIN DESCRIPTION - ORIENTATION
ORIENTATION: ANTERIOR;LEFT
ORIENTATION: LEFT;ANTERIOR

## 2022-06-01 ASSESSMENT — PAIN SCALES - GENERAL
PAINLEVEL_OUTOF10: 8
PAINLEVEL_OUTOF10: 7
PAINLEVEL_OUTOF10: 0
PAINLEVEL_OUTOF10: 10
PAINLEVEL_OUTOF10: 4
PAINLEVEL_OUTOF10: 4
PAINLEVEL_OUTOF10: 9
PAINLEVEL_OUTOF10: 8
PAINLEVEL_OUTOF10: 0
PAINLEVEL_OUTOF10: 7
PAINLEVEL_OUTOF10: 8
PAINLEVEL_OUTOF10: 0
PAINLEVEL_OUTOF10: 4

## 2022-06-01 ASSESSMENT — PAIN DESCRIPTION - ONSET
ONSET: GRADUAL

## 2022-06-01 ASSESSMENT — PAIN DESCRIPTION - PAIN TYPE
TYPE: SURGICAL PAIN

## 2022-06-01 ASSESSMENT — PAIN DESCRIPTION - FREQUENCY
FREQUENCY: INTERMITTENT

## 2022-06-01 ASSESSMENT — PAIN DESCRIPTION - LOCATION
LOCATION: CHEST;STERNUM

## 2022-06-01 ASSESSMENT — PAIN DESCRIPTION - DESCRIPTORS
DESCRIPTORS: ACHING;DISCOMFORT
DESCRIPTORS: ACHING;DISCOMFORT;PRESSURE
DESCRIPTORS: ACHING;DISCOMFORT
DESCRIPTORS: ACHING;DISCOMFORT;PRESSURE

## 2022-06-01 ASSESSMENT — PAIN - FUNCTIONAL ASSESSMENT
PAIN_FUNCTIONAL_ASSESSMENT: ACTIVITIES ARE NOT PREVENTED

## 2022-06-01 NOTE — PLAN OF CARE
Problem: Chronic Conditions and Co-morbidities  Goal: Patient's chronic conditions and co-morbidity symptoms are monitored and maintained or improved  Recent Flowsheet Documentation  Taken 6/1/2022 1600 by Domingo Villanueva 34 - Patient's Chronic Conditions and Co-Morbidity Symptoms are Monitored and Maintained or Improved: Monitor and assess patient's chronic conditions and comorbid symptoms for stability, deterioration, or improvement  6/1/2022 0709 by Oriana Figueredo RN  Outcome: Progressing  Flowsheets (Taken 5/31/2022 2000)  Care Plan - Patient's Chronic Conditions and Co-Morbidity Symptoms are Monitored and Maintained or Improved:   Monitor and assess patient's chronic conditions and comorbid symptoms for stability, deterioration, or improvement   Collaborate with multidisciplinary team to address chronic and comorbid conditions and prevent exacerbation or deterioration   Update acute care plan with appropriate goals if chronic or comorbid symptoms are exacerbated and prevent overall improvement and discharge     Problem: Pain  Goal: Verbalizes/displays adequate comfort level or baseline comfort level  6/1/2022 1605 by Yannick Ruth RN  Outcome: Progressing  Flowsheets (Taken 6/1/2022 1605)  Verbalizes/displays adequate comfort level or baseline comfort level:   Encourage patient to monitor pain and request assistance   Administer analgesics based on type and severity of pain and evaluate response   Assess pain using appropriate pain scale   Implement non-pharmacological measures as appropriate and evaluate response  Note: Patient with surgical pain throughout the shift. Will continue to assess pain at least every 4 hours and PRN. Will offer non pharmacological measures.    6/1/2022 0709 by Oriana Figueredo RN  Outcome: Progressing     Problem: Safety - Adult  Goal: Free from fall injury  Outcome: Progressing  Flowsheets (Taken 6/1/2022 1605)  Free From Fall Injury: Instruct family/caregiver on patient safety  Note: Fall precautions in place. Call light within reach, bed alarm engaged, and bed locked and in the lowest position. Fall risk light on outside of patients room, orange blanket tied to the end of the patients bed, and non skid socks on. Will offer assistance with any needs every two hours and as needed. Will educate patient about the importance of calling when needing assistance.

## 2022-06-01 NOTE — PLAN OF CARE
Problem: Chronic Conditions and Co-morbidities  Goal: Patient's chronic conditions and co-morbidity symptoms are monitored and maintained or improved  6/1/2022 0709 by Mayur Foley RN  Outcome: Progressing  Flowsheets (Taken 5/31/2022 2000)  Care Plan - Patient's Chronic Conditions and Co-Morbidity Symptoms are Monitored and Maintained or Improved:   Monitor and assess patient's chronic conditions and comorbid symptoms for stability, deterioration, or improvement   Collaborate with multidisciplinary team to address chronic and comorbid conditions and prevent exacerbation or deterioration   Update acute care plan with appropriate goals if chronic or comorbid symptoms are exacerbated and prevent overall improvement and discharge  5/31/2022 1925 by Gabe Michel RN  Outcome: Progressing  5/31/2022 1924 by Gabe Michel RN  Outcome: Progressing     Problem: Discharge Planning  Goal: Discharge to home or other facility with appropriate resources  6/1/2022 0709 by Mayur Foley RN  Outcome: Progressing  Flowsheets (Taken 5/31/2022 2000)  Discharge to home or other facility with appropriate resources: Identify barriers to discharge with patient and caregiver  5/31/2022 1925 by Gabe Michel RN  Outcome: Progressing  5/31/2022 1924 by Gabe Michel RN  Outcome: Progressing     Problem: Pain  Goal: Verbalizes/displays adequate comfort level or baseline comfort level  6/1/2022 0709 by Mayur Foley RN  Outcome: Progressing  5/31/2022 1925 by Gabe Michel RN  Outcome: Progressing  5/31/2022 1924 by Gabe Michel RN  Outcome: Progressing     Problem: Safety - Medical Restraint  Goal: Remains free of injury from restraints (Restraint for Interference with Medical Device)  Description: INTERVENTIONS:  1. Determine that other, less restrictive measures have been tried or would not be effective before applying the restraint  2. Evaluate the patient's condition at the time of restraint application  3.  Inform patient/family regarding the reason for restraint  4.  Q2H: Monitor safety, psychosocial status, comfort, nutrition and hydration  6/1/2022 0709 by Cami Kat RN  Outcome: Progressing  5/31/2022 1925 by Darrick Elizabeth RN  Outcome: Progressing  5/31/2022 1924 by Darrick Elizabeth, RN  Outcome: Progressing

## 2022-06-01 NOTE — PROGRESS NOTES
Attempted to pull pacing wires per MD orders, met resistance. Repositioned patient and still met resistance. Dressed area, VSS, and perfect serve sent to Dav Hammer. Will continue to monitor.

## 2022-06-01 NOTE — PROGRESS NOTES
Clinical Pharmacy Progress Note     Daily Patient Counseling    Assessment / Plan: Pharmacy asked to provide counseling on new medications   Patient Education:  · Counseled patient on new medications: aspirin, clopidogrel, and atorvastatin. Discussed medication class, name, indication, major side effects, and what to do if a severe reaction occurs. · Medication education sheet provided. Daily quiz will be reviewed with patient starting tomorrow to reinforce education. · All medications will be reviewed again with patient at discharge and individual patient education sheets will be provided at that time. Please call with any questions.     Chanda Noguera PharmD, [facility] CenterCP  Wireless: [phone]  6/1/2022 2:57 PM

## 2022-06-01 NOTE — PROGRESS NOTES
Occupational Therapy  Facility/Department: [address] [place] [address] ICU  Occupational Therapy Initial Assessment and Treatment Note     Name: Dempsey Boeck  : 1960  MRN: [id number]  Date of Service: 2022    Discharge Recommendations:Perfecto Nolasco scored a 18/24 on the AM-PAC ADL Inpatient form. Current research shows that an AM-PAC score of 18 or greater is typically associated with a discharge to the patient's home setting. Based on the patient's AM-PAC score, and their current ADL deficits, it is recommended that the patient have 2-3 sessions per week of Occupational Therapy at d/c to increase the patient's independence. At this time, this patient demonstrates the endurance and safety to discharge home with [address] [place] [address] and a follow up treatment frequency of 2-3x/wk. Please see assessment section for further patient specific details. If patient discharges prior to next session this note will serve as a discharge summary. Please see below for the latest assessment towards goals. OT Equipment Recommendations  Equipment Needed: No     Treatment Diagnosis: impaired ADLs/ functional transfers / decreased endurance 2/2 CABG x 1      Assessment   Performance deficits / Impairments: Decreased functional mobility ; Decreased ADL status; Decreased endurance  Assessment: Pt from home - independent at baseline. Pt demo functioning below baseline. Pt doing well POD#1, Pt demo CGA/ MIn A with functional transfers/ Mod /Max A with LE ADLs. Anticipate good progress toward goals. Pt plans for home with family and 24hr assist at d/c.   Will follow as inpt  Treatment Diagnosis: impaired ADLs/ functional transfers / decreased endurance 2/2 CABG x 1  Prognosis: Good  Decision Making: Medium Complexity  REQUIRES OT FOLLOW-UP: Yes  Activity Tolerance  Activity Tolerance: Patient Tolerated treatment well  Activity Tolerance Comments: Pt sat 93-95% on RA after mobility        Plan   Plan  Times per Week: 2-5x  Times per Day: Daily  Current Treatment Recommendations: Endurance training,Patient/Caregiver education & training,Self-Care / ADL,Safety education & training     Restrictions  Position Activity Restriction  Other position/activity restrictions: up in chair / ambulate    Subjective   General  Chart Reviewed: Yes  Additional Pertinent Hx: Admit 5/31 with CVD to OR for scheduled CABGx 1, SVG to PDA; sternal plates on 1/87                                PMHX: HTN, HLD, afib, Aortic stenosis , former smoker ,  Family / Caregiver Present: Yes (daughter)  Diagnosis: CVD s/p CABG x 1, SVG to PDA; sternal plates  Subjective  Subjective: \" I feel lpretty good \" pt found sitting up in chair - agreeable for OOB/OT eval and tx     Social/Functional History  Social/Functional History  Lives With: Spouse  Type of Home: House  Home Layout: Bed/Bath upstairs (walk in shower in basement, toilet on 2nd floor)  Home Access: Stairs to enter with rails  Entrance Stairs - Number of Steps: 3-4 CLYDE  Home Equipment: [employer]  ADL Assistance: Independent  Homemaking Assistance: Independent  Ambulation Assistance: Independent  Transfer Assistance: Independent  Active : Yes  Type of Occupation: retired  - continues to do small jobs  Additional Comments: pt plans to go to his dtr's home at d/c.  Bed and bath are on 2nd floor, comfort height commode. Objective  Treatment included functional transfer training, ADL's and pt. education. Safety Devices  Type of Devices: Call light within reach; Left in chair;Chair alarm in place;Nurse notified     Toilet Transfers  Toilet - Technique: Ambulating  Equipment Used: Standard toilet  Toilet Transfer: Minimal assistance  Toilet Transfers Comments: boost from commode  AROM: Generally decreased, functional  Strength: Generally decreased, functional (not formally assessed 2/2 sternal precautions)  Coordination: Generally decreased, functional  Tone: Normal  Sensation: Intact  ADL  Feeding: Setup  Grooming: Setup  Grooming Skilled Clinical Factors: seated  LE Dressing: Maximum assistance; Moderate assistance  LE Dressing Skilled Clinical Factors: simulated with brief attempt  Additional Comments: Pt edu on modified tech with sternal precautions -- verb understanding -reinforce        Bed mobility  Bed Mobility Comments: pt found up in chair  Transfers  Sit to stand: Minimal assistance  Stand to sit: Contact guard assistance;Minimal assistance  Transfer Comments: v.cues for sternal precautions  Vision - Basic Assessment  Prior Vision: Blind (L eye prosthesis)  Cognition  Overall Cognitive Status: WFL     Education Given To: Patient  Education Provided: Role of Therapy;Plan of Care  Education Method: Demonstration;Verbal  Education Outcome: Verbalized understanding;Continued education needed    AM-PAC Score  AM-PAC Inpatient Daily Activity Raw Score: 18 (06/01/22 0957)  AM-PAC Inpatient ADL T-Scale Score : 38.66 (06/01/22 0957)  ADL Inpatient CMS 0-100% Score: 46.65 (06/01/22 0957)  ADL Inpatient CMS G-Code Modifier : CK (06/01/22 0957)    Goals  Short Term Goals  Time Frame for Short term goals: at d/c  Short Term Goal 1: Perform sit to stand with SBA x 5 reps  Short Term Goal 2: Commode transfers with Mod independence  Short Term Goal 3: LE ADLs with CGA  Short Term Goal 4: Stance x 8 mins with supervision for ADLs / IADLs     Therapy Time   Individual Concurrent Group Co-treatment   Time In 0817         Time Out 0856         Minutes 39              Timed Code Treatment Minutes:   23 mins     Total Treatment Minutes:  39 mins       Concepcion Rosario OT
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
## 2022-06-01 NOTE — OP NOTE
[address]               [address] [place], [address]                                OPERATIVE REPORT    PATIENT NAME: Telma Perez                  :        1960  MED REC NO:   [id number]                          ROOM:       4518  ACCOUNT NO:   [de-identified]                           ADMIT DATE: 2022  PROVIDER:     Nicko Duncan MD    DATE OF PROCEDURE:  2022    PREOPERATIVE DIAGNOSES:  Aortic valve stenosis and insufficiency; class  III chronic systolic and diastolic heart failure; single-vessel coronary  artery disease involving the proximal right coronary artery; morbid  obesity; history of paroxysmal atrial fibrillation; chronic  hypertension; and preoperative anemia. POSTOPERATIVE DIAGNOSES:  Aortic valve stenosis and insufficiency; class  III chronic systolic and diastolic heart failure; single-vessel coronary  artery disease involving the proximal right coronary artery; morbid  obesity; history of paroxysmal atrial fibrillation; chronic  hypertension; and preoperative anemia with addition of acute blood loss  anemia. PROCEDURES:  Aortic valve replacement with 25-mm Pereyra Inspiris  Resilia Bovine pericardial bioprosthesis; coronary artery bypass  grafting x1 - reverse aortic coronary saphenous vein graft to proximal  posterior descending coronary artery; transesophageal echocardiography;  total cardiopulmonary bypass; endoscopic saphenous vein harvest from  right calf; placement of 35-mm AtriClip II to the base of the left  atrial appendage; and placement of sternal plates x2 (Jennifer). SURGEON:  Heidi Ruano MD    FIRST ASSISTANT:  Baljit Brown DO    ANESTHESIA:  General endotracheal.    ESTIMATED BLOOD LOSS:  Autotransfusion. SPECIMENS:  Aortic valve leaflets and annular calcium. INDICATIONS:  The patient is a 58-year-old man who has developed  progressive exertional dyspnea.   Workup of this has found him to have  both moderate-to-severe aortic valve stenosis along with aortic valve  regurgitation as well as single-vessel coronary artery disease involving  the proximal right coronary artery. In deference, this constellation of  event, he was referred for aortic valve replacement and he elected to  not pursue TAVR and to pursue a surgical option for the advantage of the  increased durability of the surgically implanted bioprosthetic valve. He was brought to the operating room today on elective basis for this  purpose. FINDINGS AT SURGERY:  The left heart was mildly hypertrophic with an  ejection fraction around 45%. Preoperative SENA showed moderate aortic  valve regurgitation and severe stenosis with moderate annular calcium. Postoperative echo showed ejection fraction around 50% or greater. The  prosthetic valve was located at the annular location and showed zero  paravalvular leak and a tiny central jet of regurgitation typical for a  newly implanted pericardial bioprosthesis. DESCRIPTION OF PROCEDURE:  After obtaining adequate general endotracheal  anesthesia and administration of appropriate preoperative prophylactic  antibiotics, the patient's anterior chest, abdomen, and legs were all  meticulously prepped and draped in a sterile manner. A standard timeout  procedure was completed. The chest was entered via a limited skin incision and median sternotomy. Simultaneously, a segment of saphenous vein was endoscopically harvested  from the patient's right calf. The pericardium was incised and  suspended. The patient was systemically heparinized. Routine  cannulation of the distal ascending aorta and right atrium was  completed. Perfusion was initiated once an adequate ACT was noted. A  vent catheter was placed in the right superior pulmonary vein. The  retrograde cardioplegic cannula was placed in the coronary sinus via  pursestring in the right atrium.   The pericardial wall was flushed  throughout the procedure with 5 liters per minute of carbon dioxide. Once the saphenous vein was available, the aorta was cross clamped. A  cardioplegia was delivered entirely through the coronary sinus. A total  of 1500 mL was given initially. This provided thorough myocardial  cooling and satisfactory arrest.  Subsequent dose was given in about  20-minute intervals and always through a retrograde route. Once cardioplegia had been delivered, a transverse aortotomy was made  just above the sinotubular junction. Excellent visibility of the native  valve was in hand. The valve was tricuspid. It was densely fibrotic  and with quite a bit of annular calcium particularly in the noncoronary  cusp. The valve leaflets were excised. The annulus was then  meticulously decalcified with a pituitary rongeur. There was a small  amount of calcium going down on to the posterior aspect of the anterior  leaflet of the mitral valve. This was similarly removed. Once the annulus was decalcified, attention was then directed to the  coronary arteries. The heart was then appropriately positioned and the  saphenous vein was grafted in an end-to-side fashion to the proximal  posterior descending. The right coronary artery itself and the  atrioventricular groove were densely and circumferentially plaqued and  did not graft well on this location. Once the graft was placed, it was tested with heparinized saline  solution. Absolutely superb flow was identified through the graft. All  of the adjacent branches seemed to be filled with heparinized saline  when flow was tested. With the bypass procedure being completed, the attention was re-directed  to the aortic valve. Circumferentially, two Ethibond sutures were  placed at the slightly supra-annular location. The same sutures were  then run through the sewing ring of a 25-mm bioprosthesis. This valve  was sized perfectly to 50 annulus.   The valve was lowered into  appropriate location, and the sutures were secured with Cor-Knots. Excellent circumferential tissue coaptation was seen when the valve was  inspected after all sutures had been secured. The aortotomy was closed  with a single layer of running 4-0 Prolene using the overlapping  technique that is my custom. A puncture aortotomy was then made for the saphenous vein and the  saphenous was then appropriately shortened and grafted in an end-to-side  fashion to proximal ascending aorta about 1 cm above the aortotomy site. An active vent catheter was then placed in the highest portion of the  ascending aorta. The heart was then thoroughly meticulously deaired,  and the aortic cross-clamp was then released. For a gentleman who presented to the operating room in atrial  fibrillation with controlled rate, I am surprised to see that he went  into sinus rhythm on his own after about 60 seconds re-perfusion. He  remained in sinus rhythm throughout the remainder of the procedure. I should mention that after we placed the posterior descending graft, we  did place a 35-mm AtriClip II across the base of the left atrial  appendage. Rewarming had been completed during the lateral portion of the  procedure. The patient was weaned from cardiopulmonary bypass initially  on a low dose of norepinephrine, but this was weaned off after about 5  minutes or certainly after the cardiopulmonary bypass had been  discontinued. Once off cardiopulmonary bypass, the right heart was  decannulated. The patient's heparin was reversed with protamine  sulfate. Once the protamine had been given, the aortic cannula was  removed and the site secured and oversewn with 5-0 Prolene over-and-over  suture. Meticulous attention was given, hemostasis was provided every aspect of  the operative field. This being achieved, a single bipolar ventricular  pacing wire was placed in the anterior surface of right ventricle. The  pericardium was tacked together with a total of five simple 0 Vicryl  sutures. This provided a complete tissue coverage in front of the  heart. A single 36-Sudanese chest tube was placed anterior to the  pericardium. An opening was made in both the right and left pleura to  allow any possible air to escape. So, that the tension pneumothorax  could not evolve postop. Final inspection was made to ensure that hemostasis was pristine  throughout before reapproximating the sternum with three figure-of-eight  #6 stainless steel wire sutures. Additionally, a plate was placed on  the superior aspect of the manubrium and then one down on the lower  aspect of the sternum provided very rigid construct for closure of this  gentleman's sternum. He is a large man and we wanted to make sure that  we did everything possible to keep the sternum stable, if he were to use  his arms more aggressively than anticipated in the postoperative  recovery phase. Subcutaneous tissues were then copiously irrigated with  antibiotic-containing saline solution. Subcutaneous tissues and skin  were then closed in multiple layers with running absorbable suture. A  Prineo dressing was applied. After the Anesthesia performed the  pectoralis-II blocks, the patient was then prepared for transfer to the  ICU for ongoing care.         Meg Baker MD    D: 06/01/2022 9:14:33       T: 06/01/2022 11:30:23     [email or web address]  Job#: [id number]     Doc#: [id number]    CC:

## 2022-06-01 NOTE — DISCHARGE INSTR - COC
Continuity of Care Form    Patient Name: Mohsen Clark   :  1960  MRN:  [id number]    Admit date:  2022  Discharge date:  2022    Code Status Order: Prior   Advance Directives:   [facility] Documentation       Date/Time Healthcare Directive Type of Healthcare Directive Copy in [facility] [address] Agent's Name Healthcare Agent's Phone Number    22 1618 No, patient does not have an advance directive for healthcare treatment -- -- -- -- --            Admitting Physician:  Christelle Leary MD  PCP: Hernesto Stein MD    Discharging Nurse: [facility] Unit/Room#: 4759/7351-36  Discharging Unit Phone Number: ***    Emergency Contact:   Extended Emergency Contact Information  Primary Emergency Contact: Jen Ingris Rosa Phone: [phone]  Relation: Child  Secondary Emergency Contact: Kate Enciso  Address: [address] [place] [address] Phone: [phone]  Mobile Phone: [phone]  Relation: Spouse    Past Surgical History:  Past Surgical History:   Procedure Laterality Date    CABG WITH AORTIC VALVE REPLACEMENT N/A 2022    SENA; TCPB; AVR w/ 25 mm Pereyra Inspiris Resilia bovine pericardial bioprosthesis; EVH  R calf; CABG x 1, SVG to PDA; sternal plates ([place]) x 2; 35 mm Atriclip 2 to bas of L atrial appendage performed by Christelle Leary MD at [facility]  2022    EYE SURGERY      JOINT REPLACEMENT Left     x2-Knee    TOTAL KNEE ARTHROPLASTY Right        Immunization History:   Immunization History   Administered Date(s) Administered    COVID-19, Pfizer Purple top, DILUTE for use, 12+ yrs, 30mcg/0.3mL dose 2021, 2021       Active Problems:  Patient Active Problem List   Diagnosis Code    Atypical chest pain R07.89    Essential hypertension I10    HLD (hyperlipidemia) E78.5    Unstable angina (HCC) I20.0    Smoker F17.200    [facility] I50.9    Acute heart failure (HCC) I50.9    Acute respiratory failure with hypoxia (HCC) J96.01    Paroxysmal atrial fibrillation (HCC) I48.0    Nonrheumatic aortic valve stenosis I35.0    Aortic stenosis with trileaflet valve I35.0       Isolation/Infection:   Isolation            No Isolation          Patient Infection Status       None to display            Nurse Assessment:  Last Vital Signs: BP 84/60   Pulse 74   Temp 98.6 °F (37 °C) (Axillary)   Resp 12   Ht 6' 2\" (1.88 m)   Wt (!) 335 lb 1.6 oz (152 kg)   SpO2 95%   BMI 43.02 kg/m²     Last documented pain score (0-10 scale): Pain Level: 9  Last Weight:   Wt Readings from Last 1 Encounters:   06/01/22 (!) 335 lb 1.6 oz (152 kg)     Mental Status:  {IP PT MENTAL STATUS:57036}    IV Access:  { ONEAL IV ACCESS:875145245}    Nursing Mobility/ADLs:  Walking   {CHP DME DAZP:824227218}  Transfer  {CHP DME MRGA:278404053}  Bathing  {CHP DME UUSM:175792549}  Dressing  {CHP DME AEVC:871573482}  Toileting  {CHP DME TCZO:083630776}  Feeding  {CHP DME YLGC:965520671}  Med Admin  {CHP DME LLNQ:032762494}  Med Delivery   { ONEAL MED Delivery:053783867}    Wound Care Documentation and Therapy:  Incision 05/31/22 Sternum (Active)   Dressing Status Clean;Dry; Intact 06/01/22 1200   Dressing/Treatment Surgical glue 06/01/22 1200   Incision Assessment Dry 06/01/22 1200   Drainage Amount None 06/01/22 1200   Odor None 06/01/22 1200   Magda-incision Assessment Intact 06/01/22 1200   Number of days: 1       Incision 05/31/22 Pretibial Proximal;Right (Active)   Dressing Status Clean;Dry; Intact 06/01/22 1200   Incision Cleansed Not Cleansed 06/01/22 1200   Dressing/Treatment Ace wrap 06/01/22 1200   Closure Surgical glue 06/01/22 1200   Incision Assessment Other (Comment) 06/01/22 1200   Drainage Amount None 06/01/22 1200   Odor None 06/01/22 1200   Number of days: 1        Elimination:  Continence:    Bowel: {YES / TT:16544}  Bladder: {YES / ET:03227}  Urinary Catheter: {Urinary Catheter:304384109}   Colostomy/Ileostomy/Ileal Conduit: {YES / PM:77513}       Date of Last BM: ***    Intake/Output Summary (Last 24 hours) at 2022 1313  Last data filed at 2022 1200  Gross per 24 hour   Intake 4342. 85 ml   Output 1525 ml   Net 2817.85 ml     I/O last 3 completed shifts: In: 7342.9 [P.O.:240; I.V.:6102.9; IV Piggyback:1000]  Out: 2833 [Urine:2690; Chest Tube:162]    Safety Concerns:     [address] Safety Concerns:600211434}    Impairments/Disabilities:      [address] Impairments/Disabilities:149333739}    Nutrition Therapy:  Current Nutrition Therapy:   [address] Diet List:431060239}    Routes of Feeding: {CHP DME Other Feedings:389876687}  Liquids: {Slp liquid thickness:89512}  Daily Fluid Restriction: {CHP DME Yes amt example:461375068}  Last Modified Barium Swallow with Video (Video Swallowing Test): {Done Not Done AMI}    Treatments at the Time of Hospital Discharge:   Respiratory Treatments: ***  Oxygen Therapy:  {Therapy; copd oxygen:45499}  Ventilator:    {MH CC Vent BDCT:159018673}    Heart Failure Instructions for Daily Management  Patient was treated for chronic combined systolic and diastolic heart failure. he  will require the following:    Please weigh daily on the same scale and approximately the same time of day. Report weight gain of 3 pounds/day or 5 pounds/week to : Sahara Simpson [phone] and [facility] / [facility] [phone]. Please use hospital discharge weight as baseline reference. Please monitor for signs and symptoms of and report to MD:  Worsening Heart Failure: sudden weight gain, shortness of breath, lower extremity or general edema/swelling, abdominal bloating/swelling, inability to lie flat, intolerance to usual activity, or cough (especially at night). Report these finding even if no increase in weight.   Dehydration:  having difficulty or a decrease in urination, dizziness, worsening fatigue, or new onset/worsening of generalized weakness. Please continue a LOW SODIUM diet and LIMIT fluid intake to 48 - 64 ounces ( 1.5 - 2 liters) per day. Call Hernesto Stein [phone] and [facility] / Christal Goldberg [phone] with any questions or concerns. Please continue heart failure education to patient and family/support system. See After Visit Summary for hospital follow up appointment details. Consider spiritual care referral for support and/or completion of advance directives . Consider: having the facility MD complete required 7 day follow up, [place] [address] telehealth program if patient agreeable and able to participate, and palliative care consult for ongoing goals of care, end of life, and/or chronic disease management discussions. Patient's primary cardiologist is Dr Milka Aleman.          Rehab Therapies: {THERAPEUTIC INTERVENTION:5676829966}  Weight Bearing Status/Restrictions: [address] [place] [address] CC Weight Bearin}  Other Medical Equipment (for information only, NOT a DME order):  {EQUIPMENT:627312867}  Other Treatments: ***    Patient's personal belongings (please select all that are sent with patient):  {CHP DME Belongings:995939971}    RN SIGNATURE:  {Esignature:506683397}    CASE MANAGEMENT/SOCIAL WORK SECTION    Inpatient Status Date: ***    Readmission Risk Assessment Score:  Readmission Risk              Risk of Unplanned Readmission:  17           Discharging to Facility/ Agency    [facility]  Phone: [phone]  Fax: [phone]      / signature: Electronically signed by Luis Brock RN on 22 at 1:17 PM EDT    PHYSICIAN SECTION    Prognosis: Good    Condition at Discharge: Stable    Rehab Potential (if transferring to Rehab): Good    Recommended Labs or Other Treatments After Discharge: MUST  [address] [facility] [address] [facility]    Pre-op weight:   312 lbs    Incision/Wound Care:    Midsternal Chest Tube Site     Legs         Clean with antibacterial soap and water daily. Monitor for signs of infection    Upper Extremity Restrictions (sternal precautions):  No lifting, pushing, pulling over 5 pounds for 8 weeks. Remove chest tubes sutures 10 days post-op, after checking with surgeon's office. Please remove IJ suture if it is still in.   1.  Wash EACH incision daily with separate wash cloth with antibacterial soap and water; pat dry. Do NOT apply anything to incisions - NO LOTIONS, HYDROGEN PEROXIDE, POWDER. 2.  If discharged with dressing on wound, remove dressing and wash daily with antibacterial soap and water. Leave open to air unless draining. 3. CLARISSE hose on during the day and off at night. Keep legs elevated while sitting, especially if edematous. 4.  Incentive spiropmeter X 10, cough and deep breath every hour while awake. 5.  Pulse Ox checks with each visit for home care and with vital signs for ECF. CALL if pulse ox less than 90%. 6.  Keep activity log (ambulate as directed and bring log to follow up appointment.)  7. Daily weight and record  8. No tub bath. May shower  9. Up in chair for all meals  10. PT/OT - evaluate and treat  11. Follow up to see Dr Martina Lerma on Wednesday, Viky 15, 2022 at 1:15 PM    Call the surgeon at [phone] for any for any of the following reasons:  Changes in incision (increased redness, tenderness, warmth or drainage)  Call for pain not relieved with pain medication  Call for temp >101, HR <50 or >110 beats/min, SBP < 90 or >160. No bowel movement for 3 days  Daily weights: call if 2 pound weight gain in 24 hours or 5 pound weight gain in 1 week. Call for persistent diarrhea, nausea, or vomiting.   Pain, tenderness, warmth, or redness in calf  Call for symptomatic fluttering in chest, irregular pulse, dyspnea  DEPRESSION: Call Primary Care Physician if feelings of depression and depression persists  DIABETICS: Call Primary Care Physician for blood sugars >130 consistently       Physician Certification: I certify the above information and transfer of Giselle España  is necessary for the continuing treatment of the diagnosis listed and that he requires [address] for less 30 days.      Update Admission H&P: No change in H&P    PHYSICIAN SIGNATURE:  Electronically signed by RENETTA Reeves CNP on 6/3/22 at 11:40 AM EDT

## 2022-06-01 NOTE — PROGRESS NOTES
Pita at bedside to remove pacing wires at this time. Patient tolerated well and no resistance met. Area dressed with gauze and tegaderm. Will continue to monitor.

## 2022-06-01 NOTE — PROGRESS NOTES
Progress Note  Hospital Day: 2  POD#  1  S/P Coronary artery bypass x 1 + Aortic bioprosthetic valve replacement, sternal plates x 2, placement of 35 mm TRAVIS clip (2022)    Chief Complaint: Postop follow up    Mr. Gianluca Khoury is sitting up in the chair. No complaints of pain at this time     24 hour Interval History:    - uncomplicated operation. Extubated 1800. Today's plan: Transition. PT/OT. Chest tube to waterseal. Dc pacing wires & chest tubes    VITAL SIGNS   Temperature:  Current - Temp: 98.7 °F (37.1 °C); Max - Temp  Av.2 °F (37.3 °C)  Min: 98.7 °F (37.1 °C)  Max: 99.5 °F (37.5 °C)    Respiratory Rate : Resp  Avg: 15.7  Min: 12  Max: 24    Pulse Range: Pulse  Av.3  Min: 76  Max: 88    Blood Pressure Range:  Systolic (57JBU), WDP:924 , Min:100 , OEN:757   ; Diastolic (71QAZ), HCY:21, Min:61, Max:67    Pulse ox Range: SpO2  Av.5 %  Min: 90 %  Max: 100 %  O2 Flow Rate (L/min): 2 L/min    CVP (Mean): 12 mmHg  PAP (s/d): 33/17  PAP (Mean): 24 mmHg  SVR (Using ABP Mean): 642.42 dyne*sec/cm5  CCI: 2.5 L/min  SVO2 (%): 78 %    Admission Weight: Weight: (!) 313 lb 0.9 oz (142 kg)    Current Weight: up 23 lbs from preop  Patient Vitals for the past 96 hrs (Last 3 readings):   Weight   22 0630 (!) 335 lb 1.6 oz (152 kg)   22 0606 (!) 312 lb 12 oz (141.9 kg)   22 0130 (!) 313 lb 0.9 oz (142 kg)     I/O:     Intake/Output Summary (Last 24 hours) at 2022 0848  Last data filed at 2022 0800  Gross per 24 hour   Intake 7342.85 ml   Output 2852 ml   Net 4490.85 ml     Urine (rico): 2475-125-426 ml/shift  Chest tube:  52-60-50 ml/shift  serosanguinous, on suction, no leak    LINES/ACCESS:   Central Access: RIJ Day #: 1   Peripheral Access: Rt forearm Day #: 1                                Rico Day #: 1           Pacing Wires Day #:  1    Diet: ADULT DIET; Regular; 3 carb choices (45 gm/meal);  No Added Salt (3-4 gm); 1500 ml    MEDICATIONS:      sodium chloride flush 5-40 mL IntraVENous 2 times per day    aspirin  325 mg Oral Daily    clopidogrel  75 mg Oral Daily    chlorhexidine  15 mL Mouth/Throat BID    furosemide  40 mg IntraVENous BID    magnesium oxide  400 mg Oral BID    mupirocin   Nasal BID    potassium chloride  10 mEq Oral TID WC    metoprolol tartrate  12.5 mg Oral BID    [START ON 6/2/2022] lisinopril  2.5 mg Oral Lunch    atorvastatin  40 mg Oral Nightly    [START ON 6/2/2022] pantoprazole  40 mg Oral BID AC    vancomycin (VANCOCIN) IV  2,000 mg IntraVENous Q12H    insulin glargine  0.15 Units/kg SubCUTAneous Nightly    insulin lispro  0-12 Units SubCUTAneous 4x Daily AC & HS    acetaminophen  1,000 mg Oral Q6H    gabapentin  300 mg Oral TID    ceFAZolin (ANCEF) IVPB  3,000 mg IntraVENous Q8H       Infusions:   lactated ringers      lactated ringers 125 mL/hr at 05/31/22 0625    lactated ringers 50 mL/hr at 06/01/22 0421    sodium chloride      sodium chloride      insulin 3.36 Units/hr (06/01/22 0700)    dextrose      dexmedetomidine (PRECEDEX) IV infusion 0.2 mcg/kg/hr (06/01/22 0800)    nitroGLYCERIN Stopped (05/31/22 1300)    nitroprusside (NIPRIDE) 50 mg in D5W infusion      norepinephrine Stopped (05/31/22 1439)       DATA REVIEW:   CBC:   Recent Labs     05/31/22  1249 06/01/22  0404   WBC 20.7* 11.3*   HGB 11.2* 9.0*   HCT 35.0* 26.9*   MCV 88.3 87.1    120*     BMP:   Recent Labs     05/31/22  1249 05/31/22  1655 06/01/22  0403    140 137   K 4.6 5.4* 4.7    109 106   CO2 24 20* 21   PHOS  --  3.6  --    GLUCOSE 145* 122* 126*   BUN 19 19 22*   CREATININE 1.2 1.2 1.1   CALCIUM 8.3 8.3 8.3   MG 2.70*  --  2.30     Accucheck Glucoses:   Recent Labs     06/01/22  0408 06/01/22  0554 06/01/22  0602 06/01/22  0706 06/01/22  0813   POCGLU 127* 139* 141* 144* 150*     Cardiac Enzymes: No results for input(s): CKTOTAL, CKMB, CKMBINDEX in the last 72 hours.     Invalid input(s): TROPONIN  PT/INR:   Recent Labs 06/01/22  0404   PROTIME 14.6*   INR 1.15*     APTT:   Recent Labs     05/31/22  1249   APTT 26.7     ABG:    Lab Results   Component Value Date    PHART 7.352 05/31/2022    PO2ART 93.0 05/31/2022    WIR1DVZ 40.4 05/31/2022    T7KBBCUH 97 05/31/2022    OCZ0PMX 24 05/31/2022    MNJ3CTL 22.4 05/31/2022    BEART -3 05/31/2022    BEART -3 05/31/2022    BEART -3 05/31/2022    BEART -4 05/31/2022    BEART -4 05/31/2022     ASSESSMENT:   Patient Active Problem List:     Atypical chest pain     Essential hypertension     HLD (hyperlipidemia)     Unstable angina (HCC)     Smoker     HF (heart failure) (HCC)     Acute heart failure (HCC)     Acute respiratory failure with hypoxia (HCC)     Paroxysmal atrial fibrillation ([address]     Nonrheumatic aortic valve stenosis     Aortic stenosis with trileaflet valve    Neuro: awake, alert and oriented x 3  CV:   Sinus, pacing wires intact  Pulm:  normal work of breathing  Abd:  soft, non-tender; bowel sounds normal; passing flatus, no BM  Lala: clear yellow  Wounds: clean, dry and intact  Ext: warm, + edema    PLAN:   · Neuro - pain tolerated      - intraop pec blocks, Precedex while chest tube are in place     - continued scheduled APAP and gabapentin, PRN ketoralac   - PT/OT eval today   · CV - keep CVC   - remove pacing wires   - metoprolol 12.5 mg bid - hold today for marginal bp   - Lisinopril 2.5 mg starting tomorrow   - hold preop Amiodarone 200 mg daily   - Lipitor 40 mg   -  mg; Plavix 75 mg  · Pulm - off O2, continue incentive spirometry   - chest tube 164 ml, remove chest tubes when criteria met, place on waterseal  · Renal - creatinine stable - Cr 1.1, preop Cr 1.2  - wt 335 lbs, preop wt 312 lbs   - start diuresis for postop fluid retention   - urine output 2.7 L/24 hrs              - keep urinary catheter for accurate I & O  · Heme - Acute blood loss anemia as expected- hgb 9.0, monitor    - Acute postoperative thrombocytopenia - plt ct 120k,monitor       - Hold preop apixaban 5 mg bid     · ID - complete surgical prophylaxis antibiotics  · FEN - cardiac diet with 1500 ml fluid restriction     - dc insulin infusion later today then SSI     - Lantus x 1, AIc 6.2     - bowel med  · GI prophylaxis - Protonix 40 mg bid  · VTE prophylaxis - SCD and CLARISSE hose  · Disposition -   Transition to progressive care. PT/OT. Home health at discharge. Patient seen with Dr Vivek Arboleda who is agreeable to assessment and plan.         Elvira Yanez, [address] [facility] pager  6/1/2022  8:48 AM

## 2022-06-01 NOTE — PROGRESS NOTES
Chest tubes pulled at this time per MD orders. Patient tolerated well, VSS, and gauze/tegaderm dressing in place. Patient denies any pain or SOB at this time. Will continue to monitor.

## 2022-06-02 LAB
ANION GAP SERPL CALCULATED.3IONS-SCNC: 9 MMOL/L (ref 3–16)
BUN BLDV-MCNC: 25 MG/DL (ref 7–20)
CALCIUM SERPL-MCNC: 8.5 MG/DL (ref 8.3–10.6)
CHLORIDE BLD-SCNC: 101 MMOL/L (ref 99–110)
CO2: 23 MMOL/L (ref 21–32)
CREAT SERPL-MCNC: 1.3 MG/DL (ref 0.8–1.3)
EKG ATRIAL RATE: 375 BPM
EKG DIAGNOSIS: NORMAL
EKG Q-T INTERVAL: 370 MS
EKG QRS DURATION: 96 MS
EKG QTC CALCULATION (BAZETT): 474 MS
EKG R AXIS: 54 DEGREES
EKG T AXIS: 50 DEGREES
EKG VENTRICULAR RATE: 99 BPM
GFR AFRICAN AMERICAN: >60
GFR NON-AFRICAN AMERICAN: 56
GLUCOSE BLD-MCNC: 131 MG/DL (ref 70–99)
GLUCOSE BLD-MCNC: 132 MG/DL (ref 70–99)
GLUCOSE BLD-MCNC: 141 MG/DL (ref 70–99)
GLUCOSE BLD-MCNC: 173 MG/DL (ref 70–99)
GLUCOSE BLD-MCNC: 182 MG/DL (ref 70–99)
HCT VFR BLD CALC: 24.8 % (ref 40.5–52.5)
HEMOGLOBIN: 8.4 G/DL (ref 13.5–17.5)
MAGNESIUM: 2.4 MG/DL (ref 1.8–2.4)
MCH RBC QN AUTO: 29.6 PG (ref 26–34)
MCHC RBC AUTO-ENTMCNC: 33.9 G/DL (ref 31–36)
MCV RBC AUTO: 87.4 FL (ref 80–100)
PDW BLD-RTO: 14.9 % (ref 12.4–15.4)
PERFORMED ON: ABNORMAL
PLATELET # BLD: 112 K/UL (ref 135–450)
PMV BLD AUTO: 8.7 FL (ref 5–10.5)
POTASSIUM SERPL-SCNC: 4.3 MMOL/L (ref 3.5–5.1)
RBC # BLD: 2.84 M/UL (ref 4.2–5.9)
SODIUM BLD-SCNC: 133 MMOL/L (ref 136–145)
WBC # BLD: 12.5 K/UL (ref 4–11)

## 2022-06-02 PROCEDURE — 97116 GAIT TRAINING THERAPY: CPT

## 2022-06-02 PROCEDURE — 85027 COMPLETE CBC AUTOMATED: CPT

## 2022-06-02 PROCEDURE — 2580000003 HC RX 258: Performed by: INTERNAL MEDICINE

## 2022-06-02 PROCEDURE — 93005 ELECTROCARDIOGRAM TRACING: CPT | Performed by: THORACIC SURGERY (CARDIOTHORACIC VASCULAR SURGERY)

## 2022-06-02 PROCEDURE — 97530 THERAPEUTIC ACTIVITIES: CPT

## 2022-06-02 PROCEDURE — 93010 ELECTROCARDIOGRAM REPORT: CPT | Performed by: INTERNAL MEDICINE

## 2022-06-02 PROCEDURE — 2580000003 HC RX 258: Performed by: THORACIC SURGERY (CARDIOTHORACIC VASCULAR SURGERY)

## 2022-06-02 PROCEDURE — 6370000000 HC RX 637 (ALT 250 FOR IP): Performed by: PHYSICIAN ASSISTANT

## 2022-06-02 PROCEDURE — 6370000000 HC RX 637 (ALT 250 FOR IP): Performed by: CLINICAL NURSE SPECIALIST

## 2022-06-02 PROCEDURE — 2000000000 HC ICU R&B

## 2022-06-02 PROCEDURE — 80048 BASIC METABOLIC PNL TOTAL CA: CPT

## 2022-06-02 PROCEDURE — 6360000002 HC RX W HCPCS: Performed by: THORACIC SURGERY (CARDIOTHORACIC VASCULAR SURGERY)

## 2022-06-02 PROCEDURE — 83735 ASSAY OF MAGNESIUM: CPT

## 2022-06-02 PROCEDURE — 6370000000 HC RX 637 (ALT 250 FOR IP): Performed by: THORACIC SURGERY (CARDIOTHORACIC VASCULAR SURGERY)

## 2022-06-02 RX ORDER — AMIODARONE HYDROCHLORIDE 200 MG/1
200 TABLET ORAL DAILY
Status: DISCONTINUED | OUTPATIENT
Start: 2022-06-07 | End: 2022-06-05 | Stop reason: HOSPADM

## 2022-06-02 RX ORDER — AMIODARONE HYDROCHLORIDE 200 MG/1
400 TABLET ORAL 2 TIMES DAILY
Status: DISCONTINUED | OUTPATIENT
Start: 2022-06-02 | End: 2022-06-05 | Stop reason: HOSPADM

## 2022-06-02 RX ADMIN — FUROSEMIDE 40 MG: 10 INJECTION, SOLUTION INTRAMUSCULAR; INTRAVENOUS at 08:34

## 2022-06-02 RX ADMIN — ACETAMINOPHEN 1000 MG: 500 TABLET ORAL at 03:26

## 2022-06-02 RX ADMIN — LISINOPRIL 2.5 MG: 2.5 TABLET ORAL at 13:27

## 2022-06-02 RX ADMIN — Medication 15 ML: at 08:35

## 2022-06-02 RX ADMIN — FUROSEMIDE 40 MG: 10 INJECTION, SOLUTION INTRAMUSCULAR; INTRAVENOUS at 17:45

## 2022-06-02 RX ADMIN — Medication 6 MG: at 00:18

## 2022-06-02 RX ADMIN — METOPROLOL TARTRATE 12.5 MG: 25 TABLET, FILM COATED ORAL at 08:34

## 2022-06-02 RX ADMIN — ACETAMINOPHEN 1000 MG: 500 TABLET ORAL at 20:47

## 2022-06-02 RX ADMIN — OXYCODONE 5 MG: 5 TABLET ORAL at 10:25

## 2022-06-02 RX ADMIN — MUPIROCIN: 20 OINTMENT TOPICAL at 20:48

## 2022-06-02 RX ADMIN — OXYCODONE 5 MG: 5 TABLET ORAL at 18:39

## 2022-06-02 RX ADMIN — ATORVASTATIN CALCIUM 40 MG: 40 TABLET, FILM COATED ORAL at 20:46

## 2022-06-02 RX ADMIN — AMIODARONE HYDROCHLORIDE 400 MG: 200 TABLET ORAL at 20:46

## 2022-06-02 RX ADMIN — SODIUM CHLORIDE, PRESERVATIVE FREE 10 ML: 5 INJECTION INTRAVENOUS at 20:48

## 2022-06-02 RX ADMIN — OXYCODONE 10 MG: 5 TABLET ORAL at 06:20

## 2022-06-02 RX ADMIN — Medication 15 ML: at 20:46

## 2022-06-02 RX ADMIN — ASPIRIN 325 MG: 325 TABLET, COATED ORAL at 08:34

## 2022-06-02 RX ADMIN — PANTOPRAZOLE SODIUM 40 MG: 40 TABLET, DELAYED RELEASE ORAL at 06:20

## 2022-06-02 RX ADMIN — POTASSIUM CHLORIDE 10 MEQ: 750 TABLET, EXTENDED RELEASE ORAL at 08:34

## 2022-06-02 RX ADMIN — CLOPIDOGREL BISULFATE 75 MG: 75 TABLET ORAL at 08:34

## 2022-06-02 RX ADMIN — GABAPENTIN 300 MG: 300 CAPSULE ORAL at 13:27

## 2022-06-02 RX ADMIN — AMIODARONE HYDROCHLORIDE 150 MG: 50 INJECTION, SOLUTION INTRAVENOUS at 04:35

## 2022-06-02 RX ADMIN — Medication 400 MG: at 20:46

## 2022-06-02 RX ADMIN — ACETAMINOPHEN 1000 MG: 500 TABLET ORAL at 10:25

## 2022-06-02 RX ADMIN — AMIODARONE HYDROCHLORIDE 400 MG: 200 TABLET ORAL at 08:35

## 2022-06-02 RX ADMIN — AMIODARONE HYDROCHLORIDE 1 MG/MIN: 50 INJECTION, SOLUTION INTRAVENOUS at 05:09

## 2022-06-02 RX ADMIN — POTASSIUM CHLORIDE 10 MEQ: 750 TABLET, EXTENDED RELEASE ORAL at 17:45

## 2022-06-02 RX ADMIN — CEFAZOLIN SODIUM 3000 MG: 10 INJECTION, POWDER, FOR SOLUTION INTRAVENOUS at 03:26

## 2022-06-02 RX ADMIN — PANTOPRAZOLE SODIUM 40 MG: 40 TABLET, DELAYED RELEASE ORAL at 18:14

## 2022-06-02 RX ADMIN — GABAPENTIN 300 MG: 300 CAPSULE ORAL at 08:34

## 2022-06-02 RX ADMIN — AMIODARONE HYDROCHLORIDE 0.5 MG/MIN: 50 INJECTION, SOLUTION INTRAVENOUS at 13:58

## 2022-06-02 RX ADMIN — SODIUM CHLORIDE, PRESERVATIVE FREE 10 ML: 5 INJECTION INTRAVENOUS at 08:35

## 2022-06-02 RX ADMIN — OXYCODONE 5 MG: 5 TABLET ORAL at 23:29

## 2022-06-02 RX ADMIN — Medication 400 MG: at 08:34

## 2022-06-02 RX ADMIN — POTASSIUM CHLORIDE 10 MEQ: 750 TABLET, EXTENDED RELEASE ORAL at 13:27

## 2022-06-02 RX ADMIN — KETOROLAC TROMETHAMINE 15 MG: 30 INJECTION, SOLUTION INTRAMUSCULAR; INTRAVENOUS at 08:34

## 2022-06-02 RX ADMIN — KETOROLAC TROMETHAMINE 15 MG: 30 INJECTION, SOLUTION INTRAMUSCULAR; INTRAVENOUS at 00:17

## 2022-06-02 RX ADMIN — ACETAMINOPHEN 1000 MG: 500 TABLET ORAL at 17:55

## 2022-06-02 RX ADMIN — METOPROLOL TARTRATE 25 MG: 25 TABLET, FILM COATED ORAL at 20:47

## 2022-06-02 RX ADMIN — GABAPENTIN 300 MG: 300 CAPSULE ORAL at 20:46

## 2022-06-02 RX ADMIN — MUPIROCIN: 20 OINTMENT TOPICAL at 08:36

## 2022-06-02 ASSESSMENT — PAIN DESCRIPTION - DESCRIPTORS
DESCRIPTORS: ACHING
DESCRIPTORS: ACHING
DESCRIPTORS: ACHING;SORE
DESCRIPTORS: ACHING
DESCRIPTORS: ACHING
DESCRIPTORS: ACHING;SORE
DESCRIPTORS: ACHING
DESCRIPTORS: ACHING;PRESSURE
DESCRIPTORS: DISCOMFORT

## 2022-06-02 ASSESSMENT — PAIN - FUNCTIONAL ASSESSMENT
PAIN_FUNCTIONAL_ASSESSMENT: PREVENTS OR INTERFERES SOME ACTIVE ACTIVITIES AND ADLS
PAIN_FUNCTIONAL_ASSESSMENT: ACTIVITIES ARE NOT PREVENTED
PAIN_FUNCTIONAL_ASSESSMENT: ACTIVITIES ARE NOT PREVENTED
PAIN_FUNCTIONAL_ASSESSMENT: PREVENTS OR INTERFERES SOME ACTIVE ACTIVITIES AND ADLS
PAIN_FUNCTIONAL_ASSESSMENT: PREVENTS OR INTERFERES SOME ACTIVE ACTIVITIES AND ADLS

## 2022-06-02 ASSESSMENT — PAIN DESCRIPTION - LOCATION
LOCATION: CHEST;STERNUM
LOCATION: CHEST
LOCATION: CHEST;STERNUM
LOCATION: CHEST
LOCATION: CHEST;STERNUM
LOCATION: CHEST;STERNUM
LOCATION: CHEST

## 2022-06-02 ASSESSMENT — PAIN DESCRIPTION - PAIN TYPE
TYPE: SURGICAL PAIN
TYPE: ACUTE PAIN;SURGICAL PAIN
TYPE: SURGICAL PAIN
TYPE: ACUTE PAIN;SURGICAL PAIN

## 2022-06-02 ASSESSMENT — PAIN DESCRIPTION - ONSET
ONSET: ON-GOING
ONSET: PROGRESSIVE
ONSET: PROGRESSIVE
ONSET: ON-GOING
ONSET: PROGRESSIVE
ONSET: ON-GOING
ONSET: GRADUAL

## 2022-06-02 ASSESSMENT — PAIN DESCRIPTION - ORIENTATION
ORIENTATION: MID
ORIENTATION: MID;ANTERIOR
ORIENTATION: MID
ORIENTATION: MID
ORIENTATION: MID;ANTERIOR
ORIENTATION: MID;ANTERIOR
ORIENTATION: MID
ORIENTATION: MID
ORIENTATION: MID;ANTERIOR

## 2022-06-02 ASSESSMENT — PAIN SCALES - GENERAL
PAINLEVEL_OUTOF10: 7
PAINLEVEL_OUTOF10: 8
PAINLEVEL_OUTOF10: 0
PAINLEVEL_OUTOF10: 3
PAINLEVEL_OUTOF10: 5
PAINLEVEL_OUTOF10: 0
PAINLEVEL_OUTOF10: 3
PAINLEVEL_OUTOF10: 6
PAINLEVEL_OUTOF10: 7
PAINLEVEL_OUTOF10: 5
PAINLEVEL_OUTOF10: 5
PAINLEVEL_OUTOF10: 6
PAINLEVEL_OUTOF10: 8
PAINLEVEL_OUTOF10: 4

## 2022-06-02 ASSESSMENT — PAIN DESCRIPTION - FREQUENCY
FREQUENCY: CONTINUOUS
FREQUENCY: INTERMITTENT
FREQUENCY: CONTINUOUS
FREQUENCY: CONTINUOUS

## 2022-06-02 NOTE — PROGRESS NOTES
A fib on the monitor. HR 90-110s. EKG completed - see below. Amio ordered and started per protocol. Results for Anamika Flor (MRN [id number]) as of 6/2/2022 07:37   Ref.  Range 6/2/2022 03:18   Atrial Rate Latest Units:    Diagnosis Unknown Atrial fibrillationAbnormal ECG   Q-T Interval Latest Units: ms 370   QRS Duration Latest Units: ms 96   QTc Calculation (Bazett) Latest Units: ms 474   R Axis Latest Units: degrees 54   T Axis Latest Units: degrees 50   Ventricular Rate Latest Units: BPM 99

## 2022-06-02 NOTE — PROGRESS NOTES
Clinical Pharmacy Progress Note     Daily Patient Counseling    Assessment / Plan: Pharmacy asked to provide counseling on new medications   Patient Education:  · Counseled patient on new medications: lisinopril, metoprolol, and gabapentin. Discussed medication class, name, indication, major side effects, and what to do if a severe reaction occurs. · Medication education sheet provided and daily quiz reviewed with patient to reinforce education. · Discussed the following additional information with the patient:  · Briefly discussed additional medications with family in the room  · All medications will be reviewed again with patient at discharge and individual patient education sheets will be provided at that time. Please call with any questions.     John CalixtoD, BCCCP  Wireless: [phone]  6/2/2022 3:32 PM

## 2022-06-02 NOTE — PROGRESS NOTES
Physical Therapy  Facility/Department: [address] [place] [address] ICU  Daily Treatment Note  NAME: Ismael Fernandez  : 1960  MRN: [id number]    Date of Service: 2022    Discharge Recommendations: Ismael Fernandez scored a 19/24 on the AM-PAC short mobility form. Current research shows that an AM-PAC score of 18 or greater is typically associated with a discharge to the patient's home setting. Based on the patient's AM-PAC score and their current functional mobility deficits, it is recommended that the patient have 2-3 sessions per week of Physical Therapy at d/c to increase the patient's independence.  At this time, this patient demonstrates the endurance and safety to discharge home with home services and a follow up treatment frequency of 2-3x/wk. Please see assessment section for further patient specific details. PT Equipment Recommendations  Equipment Needed: No  Other: pt owns RW    Patient Diagnosis(es): The encounter diagnosis was Disease of cardiovascular system. Assessment   Assessment: Pt was limited by generalized deconditioning with GRANT and mild hypoxia (96%-92%) on 2Ls. Transfers and gait training were steady but effortful requiring extra time to perform. Equipment Needed: No  Other: pt owns      Plan    Plan  Plan weeks: 2-5     Restrictions  Position Activity Restriction  Other position/activity restrictions: up in chair / ambulate, sternal precautions     Subjective    Subjective  Subjective: Pt was sitting up willing to participate; requesting to use the restroom. Orientation  Overall Orientation Status: Within Normal Limits     Objective    Vitals:  HR: 92 BPM  SPo2: 96% on 2Ls.    Balance  Sitting: Intact  Standing: High guard  Transfer Training  Transfer Training: Yes  Sit to Stand: Supervision  Stand to Sit: Supervision  Gait Training  Gait Training: Yes  Gait  Overall Level of Assistance: Stand-by assistance  Distance (ft): 2x10 Feet + ~120ft  Assistive Device: Walker, rolling     PT Exercises  AROM Exercises: x 10 B APs, LAQ, GS   Unsupported sitting x10 mins  Lateral weight shifts attempting to perform pericare: x1min  Static standing with reaching outside ALCIDES to perform pericare: x2 mins     Safety Devices  Type of Devices: Call light within reach; Left in chair;Chair alarm in place;Nurse notified;Gait belt       Goals  Short Term Goals  Time Frame for Short term goals: discharge  Short term goal 1: Pt will transfer sit <--> stand with supervision  Short term goal 2: Pt will amb 100 ft with LRAD and supervision  Short term goal 3: Pt will negotiate 4 steps with rail and CGA  Short term goal 4: Pt will participate in bed mobility assessment  Patient Goals   Patient goals : return home    Education  Patient Education  Education Given To: Patient  Education Provided: Role of Therapy;Precautions  Education Provided Comments: sternal precautions  Education Method: Verbal  Education Outcome: Verbalized understanding    Therapy Time   Individual Concurrent Group Co-treatment   Time In 1425         Time Out 1503         Minutes Orlando Almanza, PTA

## 2022-06-02 NOTE — PROGRESS NOTES
Pt walking in room and out to the hallway. Steady on feet, four wheel walker used. Up to chair. Pt remains in a fib, SBP 120s. 3L nasal cannula after walking. Call light within reach, chair alarm engaged.

## 2022-06-02 NOTE — PLAN OF CARE
Problem: Chronic Conditions and Co-morbidities  Goal: Patient's chronic conditions and co-morbidity symptoms are monitored and maintained or improved  6/1/2022 2019 by Flako Glez RN  Outcome: Progressing  Flowsheets  Taken 6/1/2022 2000 by Flako Glez RN  Care Plan - Patient's Chronic Conditions and Co-Morbidity Symptoms are Monitored and Maintained or Improved:   Monitor and assess patient's chronic conditions and comorbid symptoms for stability, deterioration, or improvement   Collaborate with multidisciplinary team to address chronic and comorbid conditions and prevent exacerbation or deterioration   Update acute care plan with appropriate goals if chronic or comorbid symptoms are exacerbated and prevent overall improvement and discharge    Problem: Pain  Goal: Verbalizes/displays adequate comfort level or baseline comfort level  6/1/2022 2019 by Flako Glez RN  Outcome: Progressing     Problem: Safety - Adult  Goal: Free from fall injury  6/1/2022 2019 by Flako Glez RN    Problem: ABCDS Injury Assessment  Goal: Absence of physical injury  Outcome: Progressing  Flowsheets (Taken 6/1/2022 2016)  Absence of Physical Injury: Implement safety measures based on patient assessment

## 2022-06-02 NOTE — PROGRESS NOTES
Progress Note    POD2 s/p AVR 25mm Inspiris; CABG x 1 SVG-PDA; SENA; TRAVIS clip 35mm; sternal plates    Patient asleep in bed. Patients family members in room. Questions addressed. Vital Signs:                                                 BP (!) 142/84   Pulse 98   Temp 99.7 °F (37.6 °C) (Oral)   Resp 22   Ht 6' 2\" (1.88 m)   Wt (!) 329 lb 5.9 oz (149.4 kg)   SpO2 95%   BMI 42.29 kg/m²  O2 Flow Rate (L/min): 1 L/min     CVP (Mean): 12 mmHg  PAP (s/d): 33/17  PAP (Mean): 24 mmHg  SVR (Using ABP Mean): 642.42 dyne*sec/cm5  CCI: 2.5 L/min  SVO2 (%): 78 %  Admission Weight: (!) 313 lb 0.9 oz (142 kg)         I/O:      Intake/Output Summary (Last 24 hours) at 6/2/2022 1050  Last data filed at 6/2/2022 1029  Gross per 24 hour   Intake 1859.78 ml   Output 2210 ml   Net -350.22 ml       Chest Tube: removed    CV: reg, sternotomy c/d/i  Pulm: decreased  Abd: soft  Ext: warm, 1+ edema; SVG site c/d/i    Data Review:  CBC:   Recent Labs     05/31/22  1249 06/01/22  0404 06/02/22  0324   WBC 20.7* 11.3* 12.5*   HGB 11.2* 9.0* 8.4*   HCT 35.0* 26.9* 24.8*   MCV 88.3 87.1 87.4    120* 112*     BMP:   Recent Labs     05/31/22  1249 05/31/22  1249 05/31/22  1655 06/01/22  0403 06/02/22  0324      < > 140 137 133*   K 4.6   < > 5.4* 4.7 4.3      < > 109 106 101   CO2 24   < > 20* 21 23   PHOS  --   --  3.6  --   --    BUN 19   < > 19 22* 25*   CREATININE 1.2   < > 1.2 1.1 1.3   CALCIUM 8.3   < > 8.3 8.3 8.5   MG 2.70*  --   --  2.30 2.40    < > = values in this interval not displayed. Cardiac Enzymes: No results for input(s): CKTOTAL, CKMB, CKMBINDEX, TROPONINI in the last 72 hours.   PT/INR:   Recent Labs     06/01/22  0404   PROTIME 14.6*   INR 1.15*     APTT:   Recent Labs     05/31/22  1249   APTT 26.7       Assessment/Plan:    · Neuro - pain tolerated      - intraop pec blocks, Precedex while chest tube are in place     - continued scheduled APAP and gabapentin, PRN ketoralac - PT/OT eval today   · CV - keep CVC              - remove pacing wires              - increase metoprolol to 25 mg bid               - Lisinopril 2.5 mg               - hold preop Amiodarone 200 mg daily              - Lipitor 40 mg              -  mg; Plavix 75 mg  · Pulm - off O2, continue incentive spirometry              - chest tube out  · Renal - creatinine stable - Cr 1.3, preop Cr 1.2  - wt 335 lbs, preop wt 312 lbs              - start diuresis for postop fluid retention              - urine output 1.9 L/24 hrs              - keep urinary catheter for accurate I & O  · Heme - Acute blood loss anemia as expected- hgb 8.4, monitor    - Acute postoperative thrombocytopenia - plt ct 112k,monitor       - Hold preop apixaban 5 mg bid     · ID - complete surgical prophylaxis antibiotics  · FEN - cardiac diet with 1500 ml fluid restriction                - dc insulin infusion later today then SSI                - Lantus x 1, AIc 6.2                - bowel med  · GI prophylaxis - Protonix 40 mg bid  · VTE prophylaxis - SCD and CLARISSE hose  · Disposition -   Transition to progressive care. PT/OT. Went into a-fib this AM 0300. Continue amio protocol. Increased BB to 25mg BID. Home health at discharge.         Antione Freeman, [address]  6/2/2022  10:50 AM

## 2022-06-03 LAB
ANION GAP SERPL CALCULATED.3IONS-SCNC: 9 MMOL/L (ref 3–16)
BUN BLDV-MCNC: 22 MG/DL (ref 7–20)
CALCIUM SERPL-MCNC: 8.4 MG/DL (ref 8.3–10.6)
CHLORIDE BLD-SCNC: 101 MMOL/L (ref 99–110)
CO2: 24 MMOL/L (ref 21–32)
CREAT SERPL-MCNC: 1 MG/DL (ref 0.8–1.3)
EKG ATRIAL RATE: 85 BPM
EKG DIAGNOSIS: NORMAL
EKG Q-T INTERVAL: 384 MS
EKG QRS DURATION: 100 MS
EKG QTC CALCULATION (BAZETT): 507 MS
EKG R AXIS: 24 DEGREES
EKG T AXIS: 27 DEGREES
EKG VENTRICULAR RATE: 105 BPM
GFR AFRICAN AMERICAN: >60
GFR NON-AFRICAN AMERICAN: >60
GLUCOSE BLD-MCNC: 121 MG/DL (ref 70–99)
GLUCOSE BLD-MCNC: 122 MG/DL (ref 70–99)
GLUCOSE BLD-MCNC: 133 MG/DL (ref 70–99)
GLUCOSE BLD-MCNC: 144 MG/DL (ref 70–99)
GLUCOSE BLD-MCNC: 148 MG/DL (ref 70–99)
HCT VFR BLD CALC: 25 % (ref 40.5–52.5)
HEMOGLOBIN: 8.4 G/DL (ref 13.5–17.5)
MAGNESIUM: 2.3 MG/DL (ref 1.8–2.4)
MCH RBC QN AUTO: 29.5 PG (ref 26–34)
MCHC RBC AUTO-ENTMCNC: 33.7 G/DL (ref 31–36)
MCV RBC AUTO: 87.5 FL (ref 80–100)
PDW BLD-RTO: 15.7 % (ref 12.4–15.4)
PERFORMED ON: ABNORMAL
PLATELET # BLD: 127 K/UL (ref 135–450)
PMV BLD AUTO: 8.6 FL (ref 5–10.5)
POTASSIUM SERPL-SCNC: 4.2 MMOL/L (ref 3.5–5.1)
RBC # BLD: 2.86 M/UL (ref 4.2–5.9)
SODIUM BLD-SCNC: 134 MMOL/L (ref 136–145)
WBC # BLD: 9.5 K/UL (ref 4–11)

## 2022-06-03 PROCEDURE — 93010 ELECTROCARDIOGRAM REPORT: CPT | Performed by: INTERNAL MEDICINE

## 2022-06-03 PROCEDURE — 2580000003 HC RX 258: Performed by: INTERNAL MEDICINE

## 2022-06-03 PROCEDURE — 6370000000 HC RX 637 (ALT 250 FOR IP): Performed by: PHYSICIAN ASSISTANT

## 2022-06-03 PROCEDURE — 6370000000 HC RX 637 (ALT 250 FOR IP): Performed by: CLINICAL NURSE SPECIALIST

## 2022-06-03 PROCEDURE — 93005 ELECTROCARDIOGRAM TRACING: CPT | Performed by: THORACIC SURGERY (CARDIOTHORACIC VASCULAR SURGERY)

## 2022-06-03 PROCEDURE — 97530 THERAPEUTIC ACTIVITIES: CPT

## 2022-06-03 PROCEDURE — 83735 ASSAY OF MAGNESIUM: CPT

## 2022-06-03 PROCEDURE — 6360000002 HC RX W HCPCS: Performed by: CLINICAL NURSE SPECIALIST

## 2022-06-03 PROCEDURE — 2000000000 HC ICU R&B

## 2022-06-03 PROCEDURE — 6370000000 HC RX 637 (ALT 250 FOR IP): Performed by: THORACIC SURGERY (CARDIOTHORACIC VASCULAR SURGERY)

## 2022-06-03 PROCEDURE — 85027 COMPLETE CBC AUTOMATED: CPT

## 2022-06-03 PROCEDURE — 97535 SELF CARE MNGMENT TRAINING: CPT

## 2022-06-03 PROCEDURE — 80048 BASIC METABOLIC PNL TOTAL CA: CPT

## 2022-06-03 PROCEDURE — 6360000002 HC RX W HCPCS: Performed by: THORACIC SURGERY (CARDIOTHORACIC VASCULAR SURGERY)

## 2022-06-03 PROCEDURE — 36592 COLLECT BLOOD FROM PICC: CPT

## 2022-06-03 PROCEDURE — 36415 COLL VENOUS BLD VENIPUNCTURE: CPT

## 2022-06-03 RX ORDER — POTASSIUM CHLORIDE 20 MEQ/1
20 TABLET, EXTENDED RELEASE ORAL
Status: DISCONTINUED | OUTPATIENT
Start: 2022-06-03 | End: 2022-06-05 | Stop reason: HOSPADM

## 2022-06-03 RX ORDER — LACTULOSE 10 G/15ML
20 SOLUTION ORAL ONCE
Status: COMPLETED | OUTPATIENT
Start: 2022-06-03 | End: 2022-06-03

## 2022-06-03 RX ORDER — FUROSEMIDE 10 MG/ML
40 INJECTION INTRAMUSCULAR; INTRAVENOUS 3 TIMES DAILY
Status: DISCONTINUED | OUTPATIENT
Start: 2022-06-03 | End: 2022-06-05 | Stop reason: HOSPADM

## 2022-06-03 RX ADMIN — LISINOPRIL 2.5 MG: 2.5 TABLET ORAL at 13:18

## 2022-06-03 RX ADMIN — Medication 400 MG: at 07:50

## 2022-06-03 RX ADMIN — GABAPENTIN 300 MG: 300 CAPSULE ORAL at 07:50

## 2022-06-03 RX ADMIN — ATORVASTATIN CALCIUM 40 MG: 40 TABLET, FILM COATED ORAL at 20:17

## 2022-06-03 RX ADMIN — POTASSIUM CHLORIDE 20 MEQ: 1500 TABLET, EXTENDED RELEASE ORAL at 08:33

## 2022-06-03 RX ADMIN — KETOROLAC TROMETHAMINE 15 MG: 30 INJECTION, SOLUTION INTRAMUSCULAR; INTRAVENOUS at 08:06

## 2022-06-03 RX ADMIN — ASPIRIN 325 MG: 325 TABLET, COATED ORAL at 07:49

## 2022-06-03 RX ADMIN — FUROSEMIDE 40 MG: 10 INJECTION, SOLUTION INTRAMUSCULAR; INTRAVENOUS at 20:22

## 2022-06-03 RX ADMIN — ACETAMINOPHEN 1000 MG: 500 TABLET ORAL at 17:30

## 2022-06-03 RX ADMIN — METOPROLOL TARTRATE 25 MG: 25 TABLET, FILM COATED ORAL at 07:49

## 2022-06-03 RX ADMIN — FUROSEMIDE 40 MG: 10 INJECTION, SOLUTION INTRAMUSCULAR; INTRAVENOUS at 07:50

## 2022-06-03 RX ADMIN — GABAPENTIN 300 MG: 300 CAPSULE ORAL at 13:18

## 2022-06-03 RX ADMIN — AMIODARONE HYDROCHLORIDE 400 MG: 200 TABLET ORAL at 07:49

## 2022-06-03 RX ADMIN — METOPROLOL TARTRATE 25 MG: 25 TABLET, FILM COATED ORAL at 20:15

## 2022-06-03 RX ADMIN — MUPIROCIN: 20 OINTMENT TOPICAL at 23:14

## 2022-06-03 RX ADMIN — Medication 15 ML: at 23:14

## 2022-06-03 RX ADMIN — PANTOPRAZOLE SODIUM 40 MG: 40 TABLET, DELAYED RELEASE ORAL at 06:01

## 2022-06-03 RX ADMIN — LACTULOSE 20 G: 20 SOLUTION ORAL at 08:08

## 2022-06-03 RX ADMIN — ACETAMINOPHEN 1000 MG: 500 TABLET ORAL at 03:55

## 2022-06-03 RX ADMIN — MUPIROCIN: 20 OINTMENT TOPICAL at 07:53

## 2022-06-03 RX ADMIN — Medication 400 MG: at 20:17

## 2022-06-03 RX ADMIN — AMIODARONE HYDROCHLORIDE 400 MG: 200 TABLET ORAL at 20:17

## 2022-06-03 RX ADMIN — SODIUM CHLORIDE, PRESERVATIVE FREE 10 ML: 5 INJECTION INTRAVENOUS at 07:54

## 2022-06-03 RX ADMIN — KETOROLAC TROMETHAMINE 15 MG: 30 INJECTION, SOLUTION INTRAMUSCULAR; INTRAVENOUS at 17:35

## 2022-06-03 RX ADMIN — SODIUM CHLORIDE, PRESERVATIVE FREE 10 ML: 5 INJECTION INTRAVENOUS at 20:22

## 2022-06-03 RX ADMIN — POTASSIUM CHLORIDE 20 MEQ: 1500 TABLET, EXTENDED RELEASE ORAL at 17:30

## 2022-06-03 RX ADMIN — APIXABAN 5 MG: 5 TABLET, FILM COATED ORAL at 20:17

## 2022-06-03 RX ADMIN — GABAPENTIN 300 MG: 300 CAPSULE ORAL at 20:17

## 2022-06-03 RX ADMIN — PANTOPRAZOLE SODIUM 40 MG: 40 TABLET, DELAYED RELEASE ORAL at 17:30

## 2022-06-03 RX ADMIN — ACETAMINOPHEN 1000 MG: 500 TABLET ORAL at 23:11

## 2022-06-03 RX ADMIN — APIXABAN 5 MG: 5 TABLET, FILM COATED ORAL at 08:08

## 2022-06-03 RX ADMIN — FUROSEMIDE 40 MG: 10 INJECTION, SOLUTION INTRAMUSCULAR; INTRAVENOUS at 13:19

## 2022-06-03 RX ADMIN — Medication 15 ML: at 07:49

## 2022-06-03 RX ADMIN — OXYCODONE 5 MG: 5 TABLET ORAL at 06:47

## 2022-06-03 RX ADMIN — POTASSIUM CHLORIDE 20 MEQ: 1500 TABLET, EXTENDED RELEASE ORAL at 13:18

## 2022-06-03 ASSESSMENT — PAIN SCALES - GENERAL
PAINLEVEL_OUTOF10: 0
PAINLEVEL_OUTOF10: 0
PAINLEVEL_OUTOF10: 6
PAINLEVEL_OUTOF10: 0
PAINLEVEL_OUTOF10: 3
PAINLEVEL_OUTOF10: 6
PAINLEVEL_OUTOF10: 0
PAINLEVEL_OUTOF10: 0
PAINLEVEL_OUTOF10: 4
PAINLEVEL_OUTOF10: 4
PAINLEVEL_OUTOF10: 0
PAINLEVEL_OUTOF10: 8

## 2022-06-03 ASSESSMENT — PAIN DESCRIPTION - ORIENTATION
ORIENTATION: MID
ORIENTATION: MID
ORIENTATION: MID;ANTERIOR
ORIENTATION: MID;ANTERIOR

## 2022-06-03 ASSESSMENT — PAIN - FUNCTIONAL ASSESSMENT
PAIN_FUNCTIONAL_ASSESSMENT: PREVENTS OR INTERFERES SOME ACTIVE ACTIVITIES AND ADLS

## 2022-06-03 ASSESSMENT — PAIN DESCRIPTION - DESCRIPTORS
DESCRIPTORS: ACHING

## 2022-06-03 ASSESSMENT — PAIN DESCRIPTION - LOCATION
LOCATION: STERNUM
LOCATION: CHEST
LOCATION: CHEST
LOCATION: STERNUM
LOCATION: STERNUM

## 2022-06-03 ASSESSMENT — PAIN DESCRIPTION - FREQUENCY
FREQUENCY: INTERMITTENT
FREQUENCY: CONTINUOUS
FREQUENCY: CONTINUOUS
FREQUENCY: INTERMITTENT

## 2022-06-03 ASSESSMENT — PAIN DESCRIPTION - ONSET
ONSET: ON-GOING

## 2022-06-03 ASSESSMENT — PAIN DESCRIPTION - PAIN TYPE
TYPE: SURGICAL PAIN

## 2022-06-03 NOTE — CONSULTS
Nutrition Note       NUTRITION RECOMMENDATIONS:   1. PO Diet: Continue current diet    2. Nutrition Education: CABG post op diet edu given 6/3    NUTRITION ASSESSMENT:  Nutritional summary & status: Consult for CABG post op diet edu. Pt w/ family at bedside during education. RD recommended general health guidelines including increased fruits and vegetables, focusing on whole grains, and choosing leaner meat options. Pt w/ wonderful support system surrounding him. Family reports planning to make dietary/life style changes together to encourage compliance from pt. Left pt and family w/ NCM handout and contact information. Pt seems eager and motivated to make changes and likely to comply.  Admission/PMH: Admission: CABG w/ aortic valve replacement: HTN, HLD, CHF    MALNUTRITION ASSESSMENT  Context of Malnutrition: Chronic Illness   Malnutrition Status: No malnutrition    NUTRITION DIAGNOSIS   Food & Nutrition-related knowledge deficit related to cardiac dysfunction as evidenced by other (comment) (CABG)    NUTRITION INTERVENTION  Food and/or Nutrient Delivery:  Continue Current Diet  Nutrition Education/Counseling:  Education completed (6/3)       The patient will still be monitored per nutrition standards of care. Consult dietitian if nutrition interventions essential to patient care is needed.      Torin Stern [address], [address]  Taj:  [phone]  Office:  [phone]

## 2022-06-03 NOTE — PROGRESS NOTES
Clinical Pharmacy Progress Note     Daily Patient Counseling    Assessment / Plan: Pharmacy asked to provide counseling on new medications   Patient Education:  · Counseled patient on new medications: furosemide, potassium chloride, and pantoprazole. Discussed medication class, name, indication, major side effects, and what to do if a severe reaction occurs. · Medication education sheet provided and daily quiz reviewed with patient to reinforce education. · Discussed the following additional information with the patient:  · Reviewed that clopidogrel was discontinued and home apixaban was restarted. Patient and family discussed concern that apixaban may have caused heel pain for the patient. · Reviewed literature and FDA Adverse Event Reporting System and there are 6 cases of plantar fasciitis reported with apixaban. Notified patient and family and encouraged him to re-trial apixaban. He expressed understanding and will notify his provider if heel pain returns. · All medications will be reviewed again with patient at discharge and individual patient education sheets will be provided at that time. Please call with any questions.     Gabbi Ralph PharmD, [facility]  Wireless: [phone]  6/3/2022 3:26 PM

## 2022-06-03 NOTE — PROGRESS NOTES
Occupational Therapy  Facility/Department: [facility] ICU  Daily Treatment Note  NAME: Connie Luque  : 1960  MRN: [id number]    Date of Service: 6/3/2022    Discharge Recommendations: Connie Luque scored a 17/24 on the AM-PAC ADL Inpatient form. Current research shows that an AM-PAC score of 18 or greater is typically associated with a discharge to the patient's home setting. Based on the patient's AM-PAC score, and their current ADL deficits, it is recommended that the patient have 2-3 sessions per week of Occupational Therapy at d/c to increase the patient's independence. At this time, this patient demonstrates the endurance and safety to discharge home with 24hr and HHOT and a follow up treatment frequency of 2-3x/wk. Please see assessment section for further patient specific details. HOME HEALTH CARE: LEVEL 1 STANDARD    - Initial home health evaluation to occur within 24-48 hours, in patient home   - Therapy to evaluate with goal of regaining prior level of functioning   - Therapy to evaluate if patient has [address] needs for personal care    If patient discharges prior to next session this note will serve as a discharge summary. Please see below for the latest assessment towards goals. OT Equipment Recommendations  Equipment Needed: No  Other: poss TTB- defer to [facility]      Patient Diagnosis(es): The primary encounter diagnosis was Acute postoperative pain. A diagnosis of Disease of cardiovascular system was also pertinent to this visit. Assessment    Assessment: Pt is 62y POD3 CABGx1. Pt presently SBA fxl mobility and grooming, and aware of sternal pxn, progressing. Pt may benefit from cont'd skilled OT to maximize safety and IND w/ ADL and fxl mobilyt while in acute care and after anticipated d/c to home. If home d/c rec 24hr and HHOT.  Will follow per POC  Activity Tolerance: Patient tolerated treatment well  Equipment Needed: No  Other: poss TTB- defer to [facility] Plan  Times per Week: 2-5  Times per Day: Daily  Current Treatment Recommendations: Endurance training;Patient/Caregiver education & training;Self-Care / ADL; Safety education & training     Restrictions       Subjective   Subjective  Subjective: Pt up in room chair on OT approach and agreed to tx. Family present t/o session  Pain: 4/10 base of sternum  Orientation  Overall Orientation Status: Within Normal Limits  Cognition  Overall Cognitive Status: WFL        Objective    Vitals     Bed Mobility Training  Bed Mobility Training: No  Balance  Sitting: Intact  Standing: With support (at walker)  Transfer Training  Transfer Training: Yes  Sit to Stand: Supervision (6x from/to room chair)  Stand to Sit: Supervision (6x from/to room chair)  Gait  Overall Level of Assistance: Stand-by assistance (w/ cardiac O2 walker/cart. to/from bathroom ~15', walk in [place] [address]: Walker, rolling     ADL  Grooming: Stand by assistance (hand hygiene, oral care in stance at sink)  Toileting: Stand by assistance (w/ cardiac walker, 2L O2, in stance at toilet continent bladder void)        Safety Devices  Type of Devices: Call light within reach; Left in chair;Chair alarm in place;Nurse notified;Gait belt     Patient Education  Education Given To: Patient; Family  Education Provided: Role of Therapy;Plan of Care;Transfer Training;ADL Adaptive Strategies  Education Provided Comments: Education regarding sternal pxn, UB dressing strategies, transfer tech.   Education Method: Demonstration;Verbal  Barriers to Learning: None  Education Outcome: Verbalized understanding;Continued education needed    Goals  Short Term Goals  Time Frame for Short term goals: at d/c  Short Term Goal 1: Perform sit to stand with SBA x 5 reps  Short Term Goal 2: Commode transfers with Mod independence  Short Term Goal 3: LE ADLs with CGA  Short Term Goal 4: Stance x 8 mins with supervision for ADLs / IADLs       Therapy Time   Individual Concurrent Group Co-treatment   Time In 1341         Time Out 1421         Minutes 40         Timed Code Treatment Minutes: [address], MOT-OTR/L [id number]

## 2022-06-03 NOTE — PROGRESS NOTES
Progress Note  Hospital Day: 4  POD#  3  S/P Coronary artery bypass x 1 + Aortic bioprosthetic valve replacement, sternal plates x 2, placement of 35 mm TRAVIS clip (2022)    Chief Complaint: Postop follow up    Mr. Davis is sitting up in the chair about to have a donut on [employer]ut Day! States pain is better controlled    24 hour Interval History:    - uncomplicated operation. Extubated 1800.    - Transition. PT/OT. Chest tube to waterseal. Dc pacing wires & chest tubes. Added prn roxicodone   - AF - Amiodarone protocol started. BB increased 25 mg bid    Today's plan: DC Plavix. Start preop apixaban. BM med. Remove urinary catheter. Increase KCl 20 meQ tid    VITAL SIGNS   Temperature:  Current - Temp: 99.6 °F (37.6 °C); Max - Temp  Av.3 °F (37.4 °C)  Min: 98.8 °F (37.1 °C)  Max: 99.8 °F (37.7 °C)    Respiratory Rate : Resp  Av.8  Min: 13  Max: 22    Pulse Range: Pulse  Av.3  Min: 83  Max: 115    Blood Pressure Range:  Systolic (73PWN), DBT:107 , Min:102 , XDR:371   ; Diastolic (65YSE), EJF:31, Min:67, Max:99    Pulse ox Range: SpO2  Av.9 %  Min: 84 %  Max: 98 %  O2 Flow Rate (L/min): 2 L/min    Admission Weight: Weight: (!) 313 lb 0.9 oz (142 kg)    Current Weight: up 17 lbs from preop  Patient Vitals for the past 96 hrs (Last 3 readings):   Weight   22 0647 (!) 329 lb 9.4 oz (149.5 kg)   22 0600 (!) 329 lb 5.9 oz (149.4 kg)   22 0630 (!) 335 lb 1.6 oz (152 kg)     I/O:     Intake/Output Summary (Last 24 hours) at 6/3/2022 0742  Last data filed at 6/3/2022 0701  Gross per 24 hour   Intake 1888.73 ml   Output 2750 ml   Net -861.27 ml     Urine (rico): [id number] ml/shift      LINES/ACCESS:   Central Access: RIJ Day #: 3  Peripheral Access: Rt forearm Day #: 3                              Rico Day #: 3            Diet: ADULT DIET; Regular; 3 carb choices (45 gm/meal);  No Added Salt (3-4 gm); 1500 ml    MEDICATIONS:      amiodarone  400 mg Oral BID    Followed by   Shivani Law ON 6/7/2022] amiodarone  200 mg Oral Daily    metoprolol tartrate  25 mg Oral BID    sodium chloride flush  5-40 mL IntraVENous 2 times per day    aspirin  325 mg Oral Daily    clopidogrel  75 mg Oral Daily    chlorhexidine  15 mL Mouth/Throat BID    furosemide  40 mg IntraVENous BID    magnesium oxide  400 mg Oral BID    mupirocin   Nasal BID    potassium chloride  10 mEq Oral TID WC    lisinopril  2.5 mg Oral Lunch    atorvastatin  40 mg Oral Nightly    pantoprazole  40 mg Oral BID AC    insulin lispro  0-12 Units SubCUTAneous 4x Daily AC & HS    acetaminophen  1,000 mg Oral Q6H    gabapentin  300 mg Oral TID     DATA REVIEW:   CBC:   Recent Labs     05/31/22  1249 06/01/22  0404 06/02/22  0324 06/03/22  0400   WBC 20.7* 11.3* 12.5* 9.5   HGB 11.2* 9.0* 8.4* 8.4*   HCT 35.0* 26.9* 24.8* 25.0*   MCV 88.3 87.1 87.4 87.5    120* 112* 127*     BMP:   Recent Labs     05/31/22  1249 05/31/22  1655 06/01/22  0403 06/02/22  0324 06/03/22  0400    140 137 133* 134*   K 4.6 5.4* 4.7 4.3 4.2    109 106 101 101   CO2 24 20* 21 23 24   PHOS  --  3.6  --   --   --    GLUCOSE 145* 122* 126* 132* 121*   BUN 19 19 22* 25* 22*   CREATININE 1.2 1.2 1.1 1.3 1.0   CALCIUM 8.3 8.3 8.3 8.5 8.4   MG 2.70*  --  2.30 2.40 2.30     Accucheck Glucoses:   Recent Labs     06/02/22  0620 06/02/22  1205 06/02/22  1625 06/02/22 2029 06/03/22  0659   POCGLU 141* 131* 173* 182* 133*     PT/INR:   Recent Labs     06/01/22  0404   PROTIME 14.6*   INR 1.15*     APTT:   Recent Labs     05/31/22  1249   APTT 26.7     ABG:    Lab Results   Component Value Date    PHART 7.352 05/31/2022    PO2ART 93.0 05/31/2022    OME3RAU 40.4 05/31/2022    E6SQQXOG 97 05/31/2022    UAJ4MGO 24 05/31/2022    AUV8PDX 22.4 05/31/2022    BEART -3 05/31/2022    BEART -3 05/31/2022    BEART -3 05/31/2022    BEART -4 05/31/2022    BEART -4 05/31/2022     ASSESSMENT:   Patient Active Problem List:     Atypical

## 2022-06-03 NOTE — PLAN OF CARE
Problem: Chronic Conditions and Co-morbidities  Goal: Patient's chronic conditions and co-morbidity symptoms are monitored and maintained or improved  Outcome: Progressing     Problem: Discharge Planning  Goal: Discharge to home or other facility with appropriate resources  Outcome: Progressing     Problem: Pain  Goal: Verbalizes/displays adequate comfort level or baseline comfort level  Outcome: Progressing     Problem: Safety - Medical Restraint  Goal: Remains free of injury from restraints (Restraint for Interference with Medical Device)  Description: INTERVENTIONS:  1. Determine that other, less restrictive measures have been tried or would not be effective before applying the restraint  2. Evaluate the patient's condition at the time of restraint application  3. Inform patient/family regarding the reason for restraint  4.  Q2H: Monitor safety, psychosocial status, comfort, nutrition and hydration  Outcome: Progressing     Problem: Safety - Adult  Goal: Free from fall injury  Outcome: Progressing  Flowsheets (Taken 6/2/2022 2204)  Free From Fall Injury:   Instruct family/caregiver on patient safety   Based on caregiver fall risk screen, instruct family/caregiver to ask for assistance with transferring infant if caregiver noted to have fall risk factors     Problem: ABCDS Injury Assessment  Goal: Absence of physical injury  Outcome: Progressing  Flowsheets (Taken 6/2/2022 2204)  Absence of Physical Injury: Implement safety measures based on patient assessment

## 2022-06-04 LAB
ANION GAP SERPL CALCULATED.3IONS-SCNC: 12 MMOL/L (ref 3–16)
BUN BLDV-MCNC: 24 MG/DL (ref 7–20)
CALCIUM SERPL-MCNC: 8.5 MG/DL (ref 8.3–10.6)
CHLORIDE BLD-SCNC: 104 MMOL/L (ref 99–110)
CO2: 23 MMOL/L (ref 21–32)
CREAT SERPL-MCNC: 1.2 MG/DL (ref 0.8–1.3)
GFR AFRICAN AMERICAN: >60
GFR NON-AFRICAN AMERICAN: >60
GLUCOSE BLD-MCNC: 107 MG/DL (ref 70–99)
GLUCOSE BLD-MCNC: 112 MG/DL (ref 70–99)
GLUCOSE BLD-MCNC: 153 MG/DL (ref 70–99)
HCT VFR BLD CALC: 27.7 % (ref 40.5–52.5)
HEMOGLOBIN: 9.1 G/DL (ref 13.5–17.5)
MAGNESIUM: 2.2 MG/DL (ref 1.8–2.4)
MCH RBC QN AUTO: 29.3 PG (ref 26–34)
MCHC RBC AUTO-ENTMCNC: 32.7 G/DL (ref 31–36)
MCV RBC AUTO: 89.5 FL (ref 80–100)
PDW BLD-RTO: 15.2 % (ref 12.4–15.4)
PERFORMED ON: ABNORMAL
PERFORMED ON: ABNORMAL
PLATELET # BLD: 160 K/UL (ref 135–450)
PMV BLD AUTO: 8.4 FL (ref 5–10.5)
POTASSIUM SERPL-SCNC: 4.2 MMOL/L (ref 3.5–5.1)
RBC # BLD: 3.1 M/UL (ref 4.2–5.9)
SODIUM BLD-SCNC: 139 MMOL/L (ref 136–145)
WBC # BLD: 7.7 K/UL (ref 4–11)

## 2022-06-04 PROCEDURE — 80048 BASIC METABOLIC PNL TOTAL CA: CPT

## 2022-06-04 PROCEDURE — 85027 COMPLETE CBC AUTOMATED: CPT

## 2022-06-04 PROCEDURE — 83735 ASSAY OF MAGNESIUM: CPT

## 2022-06-04 PROCEDURE — 2580000003 HC RX 258: Performed by: INTERNAL MEDICINE

## 2022-06-04 PROCEDURE — 2000000000 HC ICU R&B

## 2022-06-04 PROCEDURE — 6360000002 HC RX W HCPCS: Performed by: CLINICAL NURSE SPECIALIST

## 2022-06-04 PROCEDURE — 6370000000 HC RX 637 (ALT 250 FOR IP): Performed by: CLINICAL NURSE SPECIALIST

## 2022-06-04 PROCEDURE — 94761 N-INVAS EAR/PLS OXIMETRY MLT: CPT

## 2022-06-04 PROCEDURE — 6370000000 HC RX 637 (ALT 250 FOR IP): Performed by: STUDENT IN AN ORGANIZED HEALTH CARE EDUCATION/TRAINING PROGRAM

## 2022-06-04 PROCEDURE — 36415 COLL VENOUS BLD VENIPUNCTURE: CPT

## 2022-06-04 PROCEDURE — 6370000000 HC RX 637 (ALT 250 FOR IP): Performed by: THORACIC SURGERY (CARDIOTHORACIC VASCULAR SURGERY)

## 2022-06-04 PROCEDURE — 6370000000 HC RX 637 (ALT 250 FOR IP): Performed by: PHYSICIAN ASSISTANT

## 2022-06-04 RX ORDER — POTASSIUM CHLORIDE 20 MEQ/1
20 TABLET, EXTENDED RELEASE ORAL DAILY
Qty: 14 TABLET | Refills: 0 | Status: SHIPPED | OUTPATIENT
Start: 2022-06-04 | End: 2022-06-06

## 2022-06-04 RX ORDER — METOPROLOL TARTRATE 50 MG/1
50 TABLET, FILM COATED ORAL 2 TIMES DAILY
Status: DISCONTINUED | OUTPATIENT
Start: 2022-06-04 | End: 2022-06-05 | Stop reason: HOSPADM

## 2022-06-04 RX ORDER — LISINOPRIL 2.5 MG/1
2.5 TABLET ORAL
Qty: 30 TABLET | Refills: 3 | Status: SHIPPED | OUTPATIENT
Start: 2022-06-04 | End: 2022-06-06

## 2022-06-04 RX ORDER — ATORVASTATIN CALCIUM 40 MG/1
40 TABLET, FILM COATED ORAL NIGHTLY
Qty: 30 TABLET | Refills: 3 | Status: SHIPPED | OUTPATIENT
Start: 2022-06-04 | End: 2022-06-06

## 2022-06-04 RX ORDER — METOPROLOL TARTRATE 50 MG/1
50 TABLET, FILM COATED ORAL 2 TIMES DAILY
Qty: 60 TABLET | Refills: 3 | Status: SHIPPED | OUTPATIENT
Start: 2022-06-04 | End: 2022-06-06

## 2022-06-04 RX ORDER — GABAPENTIN 300 MG/1
300 CAPSULE ORAL 3 TIMES DAILY
Qty: 90 CAPSULE | Refills: 0 | Status: SHIPPED | OUTPATIENT
Start: 2022-06-04 | End: 2022-10-05

## 2022-06-04 RX ORDER — OXYCODONE HYDROCHLORIDE 5 MG/1
5 TABLET ORAL EVERY 6 HOURS PRN
Qty: 28 TABLET | Refills: 0 | Status: SHIPPED | OUTPATIENT
Start: 2022-06-04 | End: 2022-06-05

## 2022-06-04 RX ADMIN — FUROSEMIDE 40 MG: 10 INJECTION, SOLUTION INTRAMUSCULAR; INTRAVENOUS at 20:25

## 2022-06-04 RX ADMIN — AMIODARONE HYDROCHLORIDE 400 MG: 200 TABLET ORAL at 09:36

## 2022-06-04 RX ADMIN — APIXABAN 5 MG: 5 TABLET, FILM COATED ORAL at 09:36

## 2022-06-04 RX ADMIN — Medication 400 MG: at 09:35

## 2022-06-04 RX ADMIN — SODIUM CHLORIDE, PRESERVATIVE FREE 10 ML: 5 INJECTION INTRAVENOUS at 09:12

## 2022-06-04 RX ADMIN — LISINOPRIL 2.5 MG: 2.5 TABLET ORAL at 12:00

## 2022-06-04 RX ADMIN — ASPIRIN 325 MG: 325 TABLET, COATED ORAL at 09:35

## 2022-06-04 RX ADMIN — Medication 400 MG: at 20:14

## 2022-06-04 RX ADMIN — FUROSEMIDE 40 MG: 10 INJECTION, SOLUTION INTRAMUSCULAR; INTRAVENOUS at 09:12

## 2022-06-04 RX ADMIN — ACETAMINOPHEN 1000 MG: 500 TABLET ORAL at 05:26

## 2022-06-04 RX ADMIN — MUPIROCIN: 20 OINTMENT TOPICAL at 09:00

## 2022-06-04 RX ADMIN — PANTOPRAZOLE SODIUM 40 MG: 40 TABLET, DELAYED RELEASE ORAL at 05:26

## 2022-06-04 RX ADMIN — ACETAMINOPHEN 1000 MG: 500 TABLET ORAL at 17:44

## 2022-06-04 RX ADMIN — POTASSIUM CHLORIDE 20 MEQ: 1500 TABLET, EXTENDED RELEASE ORAL at 17:44

## 2022-06-04 RX ADMIN — GABAPENTIN 300 MG: 300 CAPSULE ORAL at 20:14

## 2022-06-04 RX ADMIN — Medication 6 MG: at 21:07

## 2022-06-04 RX ADMIN — ATORVASTATIN CALCIUM 40 MG: 40 TABLET, FILM COATED ORAL at 20:14

## 2022-06-04 RX ADMIN — POTASSIUM CHLORIDE 20 MEQ: 1500 TABLET, EXTENDED RELEASE ORAL at 09:39

## 2022-06-04 RX ADMIN — ACETAMINOPHEN 1000 MG: 500 TABLET ORAL at 11:30

## 2022-06-04 RX ADMIN — Medication 15 ML: at 09:36

## 2022-06-04 RX ADMIN — AMIODARONE HYDROCHLORIDE 400 MG: 200 TABLET ORAL at 20:14

## 2022-06-04 RX ADMIN — APIXABAN 5 MG: 5 TABLET, FILM COATED ORAL at 20:14

## 2022-06-04 RX ADMIN — SODIUM CHLORIDE, PRESERVATIVE FREE 10 ML: 5 INJECTION INTRAVENOUS at 20:25

## 2022-06-04 RX ADMIN — METOPROLOL TARTRATE 25 MG: 25 TABLET, FILM COATED ORAL at 09:36

## 2022-06-04 RX ADMIN — GABAPENTIN 300 MG: 300 CAPSULE ORAL at 09:35

## 2022-06-04 RX ADMIN — GABAPENTIN 300 MG: 300 CAPSULE ORAL at 14:00

## 2022-06-04 RX ADMIN — MUPIROCIN: 20 OINTMENT TOPICAL at 20:23

## 2022-06-04 RX ADMIN — PANTOPRAZOLE SODIUM 40 MG: 40 TABLET, DELAYED RELEASE ORAL at 16:24

## 2022-06-04 RX ADMIN — OXYCODONE 5 MG: 5 TABLET ORAL at 23:09

## 2022-06-04 RX ADMIN — METOPROLOL TARTRATE 50 MG: 50 TABLET, FILM COATED ORAL at 20:14

## 2022-06-04 RX ADMIN — POTASSIUM CHLORIDE 20 MEQ: 1500 TABLET, EXTENDED RELEASE ORAL at 12:00

## 2022-06-04 RX ADMIN — OXYCODONE 10 MG: 5 TABLET ORAL at 16:24

## 2022-06-04 RX ADMIN — METOPROLOL TARTRATE 25 MG: 25 TABLET, FILM COATED ORAL at 10:48

## 2022-06-04 RX ADMIN — FUROSEMIDE 40 MG: 10 INJECTION, SOLUTION INTRAMUSCULAR; INTRAVENOUS at 14:43

## 2022-06-04 RX ADMIN — OXYCODONE 5 MG: 5 TABLET ORAL at 21:07

## 2022-06-04 RX ADMIN — ACETAMINOPHEN 1000 MG: 500 TABLET ORAL at 23:12

## 2022-06-04 ASSESSMENT — PAIN SCALES - GENERAL
PAINLEVEL_OUTOF10: 7
PAINLEVEL_OUTOF10: 3
PAINLEVEL_OUTOF10: 6
PAINLEVEL_OUTOF10: 2
PAINLEVEL_OUTOF10: 6
PAINLEVEL_OUTOF10: 4
PAINLEVEL_OUTOF10: 6

## 2022-06-04 ASSESSMENT — PAIN DESCRIPTION - DESCRIPTORS
DESCRIPTORS: ACHING
DESCRIPTORS: ACHING
DESCRIPTORS: ACHING;DULL
DESCRIPTORS: ACHING
DESCRIPTORS: ACHING

## 2022-06-04 ASSESSMENT — PAIN DESCRIPTION - PAIN TYPE
TYPE: SURGICAL PAIN

## 2022-06-04 ASSESSMENT — PAIN DESCRIPTION - FREQUENCY
FREQUENCY: INTERMITTENT
FREQUENCY: CONTINUOUS

## 2022-06-04 ASSESSMENT — PAIN DESCRIPTION - LOCATION
LOCATION: STERNUM

## 2022-06-04 ASSESSMENT — PAIN - FUNCTIONAL ASSESSMENT
PAIN_FUNCTIONAL_ASSESSMENT: ACTIVITIES ARE NOT PREVENTED
PAIN_FUNCTIONAL_ASSESSMENT: PREVENTS OR INTERFERES SOME ACTIVE ACTIVITIES AND ADLS
PAIN_FUNCTIONAL_ASSESSMENT: PREVENTS OR INTERFERES SOME ACTIVE ACTIVITIES AND ADLS
PAIN_FUNCTIONAL_ASSESSMENT: ACTIVITIES ARE NOT PREVENTED
PAIN_FUNCTIONAL_ASSESSMENT: PREVENTS OR INTERFERES SOME ACTIVE ACTIVITIES AND ADLS

## 2022-06-04 ASSESSMENT — PAIN DESCRIPTION - ONSET
ONSET: ON-GOING
ONSET: GRADUAL
ONSET: ON-GOING

## 2022-06-04 ASSESSMENT — PAIN DESCRIPTION - ORIENTATION
ORIENTATION: MID;ANTERIOR
ORIENTATION: ANTERIOR;MID
ORIENTATION: MID;ANTERIOR
ORIENTATION: ANTERIOR;MID
ORIENTATION: MID

## 2022-06-04 NOTE — PROGRESS NOTES
Patient rested well overnight. Awakened around 0430 and ambulated to bathroom for BM. Magda care, back washed, gown and linens changed. Patient assisted to the chair. Talking with daughter on the phone. Call light in reach. Encouraged to call for any needs.

## 2022-06-04 NOTE — PROGRESS NOTES
Patient remains alert and oriented. Denies pain at present, only when coughing. Vitals as charted. HR ST at 117. PO Metoprolol and PM meds given. Patient remains on room air. Voiding per urinal. Patient's daughter assisted patient with a partial bath. Bed alarm on, bed in lowest position, side rails up X 3. Call light in reach, encouraged to call for any needs.

## 2022-06-04 NOTE — ANESTHESIA POSTPROCEDURE EVALUATION
Department of Anesthesiology  Postprocedure Note    Patient: John Barajas  MRN: [id number]  YOB: 1960  Date of evaluation: 6/4/2022  Time:  8:52 AM     Procedure Summary     Date: 05/31/22 Room / Location: [address] [place] [address] / [facility]    Anesthesia Start: 9622 Anesthesia Stop: 4394    Procedure: SENA; TCPB; AVR w/ 25 mm Pereyra Inspiris Resilia bovine pericardial bioprosthesis; EVH  R calf; CABG x 1, SVG to PDA; sternal plates ([place]) x 2; 35 mm Atriclip 2 to bas of L atrial appendage (N/A Chest) Diagnosis:       Disease of cardiovascular system      (Disease of cardiovascular system [I25.10])    Surgeons: Karyle Smalls, MD Responsible Provider: Navdeep Pena DO    Anesthesia Type: General ASA Status: 4          Anesthesia Type: General    Anthony Phase I: Anthony Score: 10    Anthony Phase II:      Last vitals: Reviewed and per EMR flowsheets.        Anesthesia Post Evaluation    Patient location during evaluation: ICU  Patient participation: complete - patient participated  Level of consciousness: awake and alert  Pain score: 2  Airway patency: patent  Nausea & Vomiting: no nausea and no vomiting  Complications: no  Cardiovascular status: hemodynamically stable  Respiratory status: acceptable  Hydration status: stable

## 2022-06-04 NOTE — PLAN OF CARE
Problem: Chronic Conditions and Co-morbidities  Goal: Patient's chronic conditions and co-morbidity symptoms are monitored and maintained or improved  Flowsheets (Taken 6/4/2022 1706)  Care Plan - Patient's Chronic Conditions and Co-Morbidity Symptoms are Monitored and Maintained or Improved:   Monitor and assess patient's chronic conditions and comorbid symptoms for stability, deterioration, or improvement   Collaborate with multidisciplinary team to address chronic and comorbid conditions and prevent exacerbation or deterioration   Update acute care plan with appropriate goals if chronic or comorbid symptoms are exacerbated and prevent overall improvement and discharge     Problem: Discharge Planning  Goal: Discharge to home or other facility with appropriate resources  Flowsheets (Taken 6/4/2022 1706)  Discharge to home or other facility with appropriate resources:   Identify barriers to discharge with patient and caregiver   Identify discharge learning needs (meds, wound care, etc)   Refer to discharge planning if patient needs post-hospital services based on physician order or complex needs related to functional status, cognitive ability or social support system   Arrange for needed discharge resources and transportation as appropriate   Arrange for interpreters to assist at discharge as needed     Problem: Pain  Goal: Verbalizes/displays adequate comfort level or baseline comfort level  Flowsheets (Taken 6/4/2022 1706)  Verbalizes/displays adequate comfort level or baseline comfort level:   Encourage patient to monitor pain and request assistance   Administer analgesics based on type and severity of pain and evaluate response   Consider cultural and social influences on pain and pain management   Notify Licensed Independent Practitioner if interventions unsuccessful or patient reports new pain   Implement non-pharmacological measures as appropriate and evaluate response   Assess pain using appropriate pain scale     Problem: Safety - Adult  Goal: Free from fall injury  Flowsheets (Taken 6/4/2022 1706)  Free From Fall Injury:   Instruct family/caregiver on patient safety   Based on caregiver fall risk screen, instruct family/caregiver to ask for assistance with transferring infant if caregiver noted to have fall risk factors     Problem: ABCDS Injury Assessment  Goal: Absence of physical injury  Flowsheets (Taken 6/4/2022 1706)  Absence of Physical Injury: Implement safety measures based on patient assessment

## 2022-06-04 NOTE — CARE COORDINATION
Social Work: Discussed in Interdisciplinary Rounds:    SW following for discharge disposition. Per chart review, plan is discharge to daughter's home with [facility]. SW to make referral to [facility]. SW placed call to dtr to confirm discharge plan, left message, no return call. Per chart review, discharge order to be cancelled due to high 's.     Sergey Shah, MSLISSET  [phone]

## 2022-06-04 NOTE — PLAN OF CARE
Problem: Chronic Conditions and Co-morbidities  Goal: Patient's chronic conditions and co-morbidity symptoms are monitored and maintained or improved  Outcome: Progressing  Flowsheets (Taken 6/3/2022 2000)  Care Plan - Patient's Chronic Conditions and Co-Morbidity Symptoms are Monitored and Maintained or Improved:   Monitor and assess patient's chronic conditions and comorbid symptoms for stability, deterioration, or improvement   Collaborate with multidisciplinary team to address chronic and comorbid conditions and prevent exacerbation or deterioration   Update acute care plan with appropriate goals if chronic or comorbid symptoms are exacerbated and prevent overall improvement and discharge     Problem: Discharge Planning  Goal: Discharge to home or other facility with appropriate resources  Outcome: Progressing  Flowsheets (Taken 6/3/2022 2000)  Discharge to home or other facility with appropriate resources:   Identify barriers to discharge with patient and caregiver   Arrange for needed discharge resources and transportation as appropriate   Identify discharge learning needs (meds, wound care, etc)   Refer to discharge planning if patient needs post-hospital services based on physician order or complex needs related to functional status, cognitive ability or social support system     Problem: Pain  Goal: Verbalizes/displays adequate comfort level or baseline comfort level  Outcome: Progressing     Problem: Safety - Medical Restraint  Goal: Remains free of injury from restraints (Restraint for Interference with Medical Device)  Description: INTERVENTIONS:  1. Determine that other, less restrictive measures have been tried or would not be effective before applying the restraint  2. Evaluate the patient's condition at the time of restraint application  3. Inform patient/family regarding the reason for restraint  4.  Q2H: Monitor safety, psychosocial status, comfort, nutrition and hydration  Outcome: Completed Problem: Safety - Adult  Goal: Free from fall injury  6/4/2022 0032 by Susan Chaney RN  Outcome: Progressing  Flowsheets (Taken 6/3/2022 2030)  Free From Fall Injury:   Instruct family/caregiver on patient safety   Based on caregiver fall risk screen, instruct family/caregiver to ask for assistance with transferring infant if caregiver noted to have fall risk factors     Problem: ABCDS Injury Assessment  Goal: Absence of physical injury  Outcome: Progressing  Flowsheets (Taken 6/3/2022 2030)  Absence of Physical Injury: Implement safety measures based on patient assessment

## 2022-06-04 NOTE — PROGRESS NOTES
Dr Dexter Reagan at bedside, updated on pt condition. Will increase Metoprolol dose and DC if HR stabilizes.

## 2022-06-04 NOTE — PROGRESS NOTES
Progress Note  Hospital Day: 5  POD#  4  S/P Coronary artery bypass x 1 + Aortic bioprosthetic valve replacement, sternal plates x 2, placement of 35 mm TRAVIS clip (2022)    Chief Complaint: Postop follow up    Mr. Davis is sitting up in the chair about to have a donut on National Donut Day! States pain is better controlled    24 hour Interval History:    - uncomplicated operation. Extubated 1800.    - Transition. PT/OT. Chest tube to waterseal. Dc pacing wires & chest tubes. Added prn roxicodone   - AF - Amiodarone protocol started. BB increased 25 mg bid    Today's plan: discharge    VITAL SIGNS   Temperature:  Current - Temp: 99.1 °F (37.3 °C); Max - Temp  Av.8 °F (37.1 °C)  Min: 97.9 °F (36.6 °C)  Max: 99.8 °F (37.7 °C)    Respiratory Rate : Resp  Av.4  Min: 12  Max: 28    Pulse Range: Pulse  Av.9  Min: 88  Max: 121    Blood Pressure Range:  Systolic (92FLQ), NWS:603 , Min:96 , XGC:116   ; Diastolic (91IQD), XG, Min:63, Max:134    Pulse ox Range: SpO2  Av.1 %  Min: 94 %  Max: 100 %  O2 Flow Rate (L/min): 0 L/min    Admission Weight: Weight: (!) 313 lb 0.9 oz (142 kg)    Current Weight: up 17 lbs from preop  Patient Vitals for the past 96 hrs (Last 3 readings):   Weight   22 0445 (!) 314 lb 13.1 oz (142.8 kg)   22 0647 (!) 329 lb 9.4 oz (149.5 kg)   22 0600 (!) 329 lb 5.9 oz (149.4 kg)     I/O:     Intake/Output Summary (Last 24 hours) at 2022 1001  Last data filed at 2022 0515  Gross per 24 hour   Intake 660 ml   Output 1400 ml   Net -740 ml     Urine (rico): [phone] ml/shift      LINES/ACCESS:   Central Access: [facility] Day #: 3  Peripheral Access: Rt forearm Day #: 3                              Rico Day #: 3            Diet: ADULT DIET; Regular; 3 carb choices (45 gm/meal);  Low Fat/Low Chol/High Fiber/2 gm Na; 1500 ml    MEDICATIONS:      apixaban  5 mg Oral BID    potassium chloride  20 mEq Oral TID WC    furosemide  40 mg IntraVENous TID    amiodarone  400 mg Oral BID    Followed by   Alvin Mota ON 6/7/2022] amiodarone  200 mg Oral Daily    metoprolol tartrate  25 mg Oral BID    sodium chloride flush  5-40 mL IntraVENous 2 times per day    aspirin  325 mg Oral Daily    chlorhexidine  15 mL Mouth/Throat BID    magnesium oxide  400 mg Oral BID    mupirocin   Nasal BID    lisinopril  2.5 mg Oral Lunch    atorvastatin  40 mg Oral Nightly    pantoprazole  40 mg Oral BID AC    insulin lispro  0-12 Units SubCUTAneous 4x Daily AC & HS    acetaminophen  1,000 mg Oral Q6H    gabapentin  300 mg Oral TID     DATA REVIEW:   CBC:   Recent Labs     06/02/22 0324 06/03/22  0400 06/04/22  0412   WBC 12.5* 9.5 7.7   HGB 8.4* 8.4* 9.1*   HCT 24.8* 25.0* 27.7*   MCV 87.4 87.5 89.5   * 127* 160     BMP:   Recent Labs     06/02/22 0324 06/03/22  0400 06/04/22  0412   * 134* 139   K 4.3 4.2 4.2    101 104   CO2 23 24 23   GLUCOSE 132* 121* 112*   BUN 25* 22* 24*   CREATININE 1.3 1.0 1.2   CALCIUM 8.5 8.4 8.5   MG 2.40 2.30 2.20     Accucheck Glucoses:   Recent Labs     06/02/22 2029 06/03/22  0659 06/03/22  1151 06/03/22  1742 06/03/22  2118   POCGLU 182* 133* 144* 122* 148*     PT/INR:   No results for input(s): PROTIME, INR in the last 72 hours. APTT:   No results for input(s): APTT in the last 72 hours.   ABG:    Lab Results   Component Value Date    PHART 7.352 05/31/2022    PO2ART 93.0 05/31/2022    JGS1KCJ 40.4 05/31/2022    Y1WQELPB 97 05/31/2022    HMQ6NRP 24 05/31/2022    WZZ7QAO 22.4 05/31/2022    BEART -3 05/31/2022    BEART -3 05/31/2022    BEART -3 05/31/2022    BEART -4 05/31/2022    BEART -4 05/31/2022     ASSESSMENT:   Patient Active Problem List:     Atypical chest pain     Essential hypertension     HLD (hyperlipidemia)     Unstable angina (HCC)     Smoker     HF (heart failure) ([facility] [address]     Acute heart failure (HCC)     Acute respiratory failure with hypoxia (HCC)     Paroxysmal atrial fibrillation ([facility] Nonrheumatic aortic valve stenosis     Aortic stenosis with trileaflet valve    Neuro: awake, alert and oriented x 3  CV:   Chronic Atrial fib with controlled VR  Pulm:  normal work of breathing, diminished. Using IS to 1000 ml  Abd:  soft, non-tender; bowel sounds normal; passing flatus, no BM  Lala: clear yellow  Wounds: clean, dry and intact  Ext: warm, + edema    PLAN:   · Neuro - pain tolerated      - intraop pec blocks, Precedex while chest tube are in place     - continued scheduled APAP and gabapentin, PRN ketoralac   - PT  19/24 on 6/2  on the AM-PAC short mobility form   - OT  18/24 on 6/1 on the AM-PAC ADL Inpatient form  · CV - remove CVC tomorrow   - continue Amiodarone protocol   - metoprolol 25 mg bid    - Lisinopril 2.5 mg    - Lipitor 40 mg   -  mg; DC Plavix. - restart preop apixaban 5 mg bid  · Pulm - wean O2, continue incentive spirometry  · Renal - creatinine stable - Cr 1.0, preop Cr 1.2  - back to preop weight of 314 lbs   -iincrease Lasix 40 mg tid   - urine output 2.6 L/24 hrs              - remove urinary catheter   · Heme - Acute blood loss anemia as expected- hgb 8.4, monitor   · ID - surgical prophylaxis antibiotics completed  · FEN - cardiac diet with 1500 ml fluid restriction     - SSI, A1c 6.2     - bowel med - Lactulose x1     - increase KCl 20 mEq tid  · GI prophylaxis - Protonix 40 mg bid  · VTE prophylaxis - SCD and CLARISSE hose  · Disposition -  Continue PT/OT. Home health at discharge.       HOME TODAY if stays in [facility] MD Jai    6/4/2022  10:01 AM

## 2022-06-04 NOTE — PLAN OF CARE
Problem: Safety - Adult  Goal: Free from fall injury  Outcome: Progressing  Note: Fall risk protocol in place: Bed alarm on, fall risk bracelet applied, yellow indicator in hallway on, non-skid footwear in use. Patient/family educated on fall risk protocol, instructed to call for assistance when needed. Problem: Skin/Tissue Integrity  Goal: Absence of new skin breakdown  Description: 1. Monitor for areas of redness and/or skin breakdown  2. Assess vascular access sites hourly  3. Every 4-6 hours minimum:  Change oxygen saturation probe site  4. Every 4-6 hours:  If on nasal continuous positive airway pressure, respiratory therapy assess nares and determine need for appliance change or resting period. Outcome: Progressing  Note: Patient turns self adequately in bed, Sacral Heart Mepilex in place to protect, skin intact underneath.

## 2022-06-05 VITALS
RESPIRATION RATE: 18 BRPM | HEIGHT: 74 IN | DIASTOLIC BLOOD PRESSURE: 78 MMHG | TEMPERATURE: 98.2 F | BODY MASS INDEX: 40.37 KG/M2 | WEIGHT: 314.6 LBS | HEART RATE: 98 BPM | OXYGEN SATURATION: 99 % | SYSTOLIC BLOOD PRESSURE: 124 MMHG

## 2022-06-05 LAB
ANION GAP SERPL CALCULATED.3IONS-SCNC: 12 MMOL/L (ref 3–16)
BUN BLDV-MCNC: 22 MG/DL (ref 7–20)
CALCIUM SERPL-MCNC: 9.1 MG/DL (ref 8.3–10.6)
CHLORIDE BLD-SCNC: 102 MMOL/L (ref 99–110)
CO2: 25 MMOL/L (ref 21–32)
CREAT SERPL-MCNC: 1.1 MG/DL (ref 0.8–1.3)
GFR AFRICAN AMERICAN: >60
GFR NON-AFRICAN AMERICAN: >60
GLUCOSE BLD-MCNC: 130 MG/DL (ref 70–99)
HCT VFR BLD CALC: 27.7 % (ref 40.5–52.5)
HEMOGLOBIN: 9.4 G/DL (ref 13.5–17.5)
MAGNESIUM: 2.1 MG/DL (ref 1.8–2.4)
MCH RBC QN AUTO: 29.4 PG (ref 26–34)
MCHC RBC AUTO-ENTMCNC: 33.9 G/DL (ref 31–36)
MCV RBC AUTO: 86.8 FL (ref 80–100)
PDW BLD-RTO: 15.5 % (ref 12.4–15.4)
PLATELET # BLD: 225 K/UL (ref 135–450)
PMV BLD AUTO: 7.9 FL (ref 5–10.5)
POTASSIUM SERPL-SCNC: 3.9 MMOL/L (ref 3.5–5.1)
RBC # BLD: 3.19 M/UL (ref 4.2–5.9)
SODIUM BLD-SCNC: 139 MMOL/L (ref 136–145)
WBC # BLD: 9.1 K/UL (ref 4–11)

## 2022-06-05 PROCEDURE — 85027 COMPLETE CBC AUTOMATED: CPT

## 2022-06-05 PROCEDURE — 2580000003 HC RX 258: Performed by: STUDENT IN AN ORGANIZED HEALTH CARE EDUCATION/TRAINING PROGRAM

## 2022-06-05 PROCEDURE — 6360000002 HC RX W HCPCS: Performed by: CLINICAL NURSE SPECIALIST

## 2022-06-05 PROCEDURE — 6370000000 HC RX 637 (ALT 250 FOR IP): Performed by: CLINICAL NURSE SPECIALIST

## 2022-06-05 PROCEDURE — 36415 COLL VENOUS BLD VENIPUNCTURE: CPT

## 2022-06-05 PROCEDURE — 80048 BASIC METABOLIC PNL TOTAL CA: CPT

## 2022-06-05 PROCEDURE — 6370000000 HC RX 637 (ALT 250 FOR IP): Performed by: STUDENT IN AN ORGANIZED HEALTH CARE EDUCATION/TRAINING PROGRAM

## 2022-06-05 PROCEDURE — 83735 ASSAY OF MAGNESIUM: CPT

## 2022-06-05 PROCEDURE — 6360000002 HC RX W HCPCS: Performed by: STUDENT IN AN ORGANIZED HEALTH CARE EDUCATION/TRAINING PROGRAM

## 2022-06-05 PROCEDURE — 6370000000 HC RX 637 (ALT 250 FOR IP): Performed by: THORACIC SURGERY (CARDIOTHORACIC VASCULAR SURGERY)

## 2022-06-05 RX ORDER — OXYCODONE HYDROCHLORIDE 5 MG/1
5 TABLET ORAL EVERY 6 HOURS PRN
Qty: 28 TABLET | Refills: 0 | Status: SHIPPED | OUTPATIENT
Start: 2022-06-05 | End: 2022-06-12

## 2022-06-05 RX ADMIN — POTASSIUM CHLORIDE 20 MEQ: 1500 TABLET, EXTENDED RELEASE ORAL at 09:28

## 2022-06-05 RX ADMIN — APIXABAN 5 MG: 5 TABLET, FILM COATED ORAL at 09:27

## 2022-06-05 RX ADMIN — PANTOPRAZOLE SODIUM 40 MG: 40 TABLET, DELAYED RELEASE ORAL at 05:41

## 2022-06-05 RX ADMIN — ACETAMINOPHEN 1000 MG: 500 TABLET ORAL at 12:00

## 2022-06-05 RX ADMIN — LISINOPRIL 2.5 MG: 2.5 TABLET ORAL at 12:00

## 2022-06-05 RX ADMIN — Medication 400 MG: at 09:27

## 2022-06-05 RX ADMIN — ASPIRIN 325 MG: 325 TABLET, COATED ORAL at 09:28

## 2022-06-05 RX ADMIN — GABAPENTIN 300 MG: 300 CAPSULE ORAL at 09:28

## 2022-06-05 RX ADMIN — METOPROLOL TARTRATE 50 MG: 50 TABLET, FILM COATED ORAL at 08:17

## 2022-06-05 RX ADMIN — OXYCODONE 10 MG: 5 TABLET ORAL at 05:42

## 2022-06-05 RX ADMIN — GABAPENTIN 300 MG: 300 CAPSULE ORAL at 14:11

## 2022-06-05 RX ADMIN — ACETAMINOPHEN 1000 MG: 500 TABLET ORAL at 05:41

## 2022-06-05 RX ADMIN — POTASSIUM CHLORIDE 20 MEQ: 1500 TABLET, EXTENDED RELEASE ORAL at 12:00

## 2022-06-05 RX ADMIN — Medication 15 ML: at 09:28

## 2022-06-05 RX ADMIN — FUROSEMIDE 40 MG: 10 INJECTION, SOLUTION INTRAMUSCULAR; INTRAVENOUS at 09:27

## 2022-06-05 RX ADMIN — AMIODARONE HYDROCHLORIDE 1 MG/MIN: 50 INJECTION, SOLUTION INTRAVENOUS at 10:42

## 2022-06-05 RX ADMIN — AMIODARONE HYDROCHLORIDE 400 MG: 200 TABLET ORAL at 09:27

## 2022-06-05 ASSESSMENT — PAIN DESCRIPTION - FREQUENCY
FREQUENCY: CONTINUOUS
FREQUENCY: INTERMITTENT

## 2022-06-05 ASSESSMENT — PAIN DESCRIPTION - DESCRIPTORS
DESCRIPTORS: ACHING
DESCRIPTORS: ACHING

## 2022-06-05 ASSESSMENT — PAIN SCALES - GENERAL
PAINLEVEL_OUTOF10: 2
PAINLEVEL_OUTOF10: 7
PAINLEVEL_OUTOF10: 4
PAINLEVEL_OUTOF10: 0

## 2022-06-05 ASSESSMENT — PAIN - FUNCTIONAL ASSESSMENT
PAIN_FUNCTIONAL_ASSESSMENT: ACTIVITIES ARE NOT PREVENTED
PAIN_FUNCTIONAL_ASSESSMENT: ACTIVITIES ARE NOT PREVENTED

## 2022-06-05 ASSESSMENT — PAIN DESCRIPTION - LOCATION
LOCATION: STERNUM
LOCATION: STERNUM

## 2022-06-05 ASSESSMENT — PAIN DESCRIPTION - ORIENTATION
ORIENTATION: MID;ANTERIOR
ORIENTATION: MID;ANTERIOR

## 2022-06-05 ASSESSMENT — PAIN DESCRIPTION - PAIN TYPE
TYPE: SURGICAL PAIN
TYPE: SURGICAL PAIN

## 2022-06-05 ASSESSMENT — PAIN DESCRIPTION - ONSET
ONSET: ON-GOING
ONSET: GRADUAL

## 2022-06-05 NOTE — CARE COORDINATION
Case Management Assessment            Discharge Note                    Date / Time of Note: 6/5/2022 1:08 PM                  Discharge Note Completed by: Jose Alberto Daugherty RN     Pt is planning to discharge home to his daughter's house. The address where he will be going is [address] [place] [address]. The daughter, Gilford Goods, is who should be called to schedule appointments [phone]. Confirmed this plan with the pt's family. Patient Name: Zofia Giron   YOB: 1960  Diagnosis: Disease of cardiovascular system [I25.10]  Aortic stenosis with trileaflet valve [I35.0]   Date / Time: 5/31/2022  5:14 AM    Current PCP: Noe Klein MD     Advance Directives:  Code Status: Full Code    Financial:  Payor: Nino Lofton / Plan: [place] / Product Type: *No Product type* /      Pharmacy:    [place] [id number], [address] [facility] [phone]  [place] [address]  Phone: [phone] Fax: [phone]      ADLS:  Current PT AM-PAC Score: 19 /24  Current OT AM-PAC Score: 17 /24    DISCHARGE Disposition: Home with [address] [place] [address] Way: Referral to [facility]     IMM Completed:   Yes, Case management has presented and reviewed IMM letter #2 to the patient and/or family/ POA. Patient and/or family/POA verbalized understanding of their medicare rights and appeal process if needed. Patient and/or family/POA has signed, initialed and placed today's date (6/5/2022) and time (1310) on IMM letter #2 on the the appropriate lines. Patient and/or family/POA, copy of letter offered and they are aware that this original copy of IMM letter #2 is available prior to discharge from the paper chart on the unit. Electronic documentation has been entered into epic for IMM letter #2 and original paper copy has been added to the paper chart at the nurses station.      Transportation:  Transportation PLAN for discharge: family   Mode of Transport: [address] ordered at discharge: Yes  Home Care Agency: [employer]  Phone: [phone]  Fax: [phone]    The Patient and/or patient representative Rosy Stinson and his family were provided with a choice of provider and agrees with the discharge plan Yes    Freedom of choice list was provided with basic dialogue that supports the patient's individualized plan of care/goals and shares the quality data associated with the providers.  Yes      Kaylin Upton RN  [place] [address]  Case Management Department  [phone]

## 2022-06-05 NOTE — PROGRESS NOTES
Patient has been resting quietly since last administration of Oxycodone. Will continue hourly rounding. Call light in reach.

## 2022-06-05 NOTE — PROGRESS NOTES
Dr. Rainer Blandon notified of pt wishes to go home,updated on pt condition per Annell Fothergill RN. Discharge order placed.

## 2022-06-05 NOTE — PROGRESS NOTES
Progress Note  Hospital Day: 6  POD#  5  S/P Coronary artery bypass x 1 + Aortic bioprosthetic valve replacement, sternal plates x 2, placement of 35 mm TRAVIS clip (2022)    Chief Complaint: Postop follow up     [facility] is sitting up in the chair about to have a donut on National Donut Day! States pain is better controlled    24 hour Interval History:    - uncomplicated operation. Extubated 1800.    - Transition. PT/OT. Chest tube to waterseal. Dc pacing wires & chest tubes. Added prn roxicodone   - AF - Amiodarone protocol started. BB increased 25 mg bid  - was in sinus and wanted to go home  - tachycardic and back in afib    Today's plan: amio gtt     VITAL SIGNS   Temperature:  Current - Temp: 98.5 °F (36.9 °C); Max - Temp  Av.7 °F (37.1 °C)  Min: 98.5 °F (36.9 °C)  Max: 99 °F (37.2 °C)    Respiratory Rate : Resp  Av.8  Min: 12  Max: 26    Pulse Range: Pulse  Av.2  Min: 94  Max: 124    Blood Pressure Range:  Systolic (68GQB), ICQ:106 , Min:110 , ABV:693   ; Diastolic (34GPO), DUJ:13, Min:72, Max:83    Pulse ox Range: SpO2  Av.4 %  Min: 95 %  Max: 98 %  O2 Flow Rate (L/min): 0 L/min    Admission Weight: Weight: (!) 313 lb 0.9 oz (142 kg)    Current Weight: up 17 lbs from preop  Patient Vitals for the past 96 hrs (Last 3 readings):   Weight   22 0600 (!) 314 lb 9.5 oz (142.7 kg)   22 0445 (!) 314 lb 13.1 oz (142.8 kg)   22 0647 (!) 329 lb 9.4 oz (149.5 kg)     I/O:     Intake/Output Summary (Last 24 hours) at 2022 0842  Last data filed at 2022 0530  Gross per 24 hour   Intake 1130 ml   Output 3175 ml   Net -2045 ml     Urine (rico): [phone] ml/shift      LINES/ACCESS:   Central Access: RIJ Day #: 3  Peripheral Access: Rt forearm Day #: 3                              Rico Day #: 3            Diet: ADULT DIET; Regular; 3 carb choices (45 gm/meal);  Low Fat/Low Chol/High Fiber/2 gm Na; 1500 ml    MEDICATIONS:      metoprolol tartrate 50 mg Oral BID    apixaban  5 mg Oral BID    potassium chloride  20 mEq Oral TID WC    furosemide  40 mg IntraVENous TID    amiodarone  400 mg Oral BID    Followed by   Shivani Morris ON 6/7/2022] amiodarone  200 mg Oral Daily    sodium chloride flush  5-40 mL IntraVENous 2 times per day    aspirin  325 mg Oral Daily    chlorhexidine  15 mL Mouth/Throat BID    magnesium oxide  400 mg Oral BID    lisinopril  2.5 mg Oral Lunch    atorvastatin  40 mg Oral Nightly    pantoprazole  40 mg Oral BID AC    insulin lispro  0-12 Units SubCUTAneous 4x Daily AC & HS    acetaminophen  1,000 mg Oral Q6H    gabapentin  300 mg Oral TID     DATA REVIEW:   CBC:   Recent Labs     06/03/22  0400 06/04/22 0412 06/05/22  0434   WBC 9.5 7.7 9.1   HGB 8.4* 9.1* 9.4*   HCT 25.0* 27.7* 27.7*   MCV 87.5 89.5 86.8   * 160 225     BMP:   Recent Labs     06/03/22  0400 06/04/22  0412 06/05/22  0434   * 139 139   K 4.2 4.2 3.9    104 102   CO2 24 23 25   GLUCOSE 121* 112* 130*   BUN 22* 24* 22*   CREATININE 1.0 1.2 1.1   CALCIUM 8.4 8.5 9.1   MG 2.30 2.20 2.10     Accucheck Glucoses:   Recent Labs     06/03/22  1151 06/03/22  1742 06/03/22  2118 06/04/22  1748 06/04/22 2030   POCGLU 144* 122* 148* 153* 107*     PT/INR:   No results for input(s): PROTIME, INR in the last 72 hours. APTT:   No results for input(s): APTT in the last 72 hours.   ABG:    Lab Results   Component Value Date    PHART 7.352 05/31/2022    PO2ART 93.0 05/31/2022    APY4WOH 40.4 05/31/2022    E4FSHXJH 97 05/31/2022    PFO4MPU 24 05/31/2022    RTH8UJE 22.4 05/31/2022    BEART -3 05/31/2022    BEART -3 05/31/2022    BEART -3 05/31/2022    BEART -4 05/31/2022    BEART -4 05/31/2022     ASSESSMENT:   Patient Active Problem List:     Atypical chest pain     Essential hypertension     HLD (hyperlipidemia)     Unstable angina (HCC)     Smoker     HF (heart failure) ([address]     Acute heart failure ([address]     Acute respiratory failure with hypoxia ([address] Paroxysmal atrial fibrillation (HCC)     Nonrheumatic aortic valve stenosis     Aortic stenosis with trileaflet valve    Neuro: awake, alert and oriented x 3  CV:   Chronic Atrial fib with controlled VR  Pulm:  normal work of breathing, diminished. Using IS to 1000 ml  Abd:  soft, non-tender; bowel sounds normal; passing flatus, no BM  Lala: clear yellow  Wounds: clean, dry and intact  Ext: warm, + edema    PLAN:   · Neuro - pain tolerated      - intraop pec blocks, Precedex while chest tube are in place     - continued scheduled APAP and gabapentin, PRN ketoralac   - PT  19/24 on 6/2  on the AM-PAC short mobility form   - OT  18/24 on 6/1 on the AM-PAC ADL Inpatient form  CV  - afib 6/5, restarted amio gtt at 1. Bolus if RVR   - metoprolol 50 mg bid (increased on 6/4)   - Lisinopril 2.5 mg    - Lipitor 40 mg   -  mg; DC Plavix. - restart preop apixaban 5 mg bid  · Pulm - wean O2, continue incentive spirometry  · Renal - creatinine stable - Cr 1.0, preop Cr 1.2  - back to preop weight of 314 lbs   -iincrease Lasix 40 mg tid   - urine output 2.6 L/24 hrs              - remove urinary catheter   · Heme - Acute blood loss anemia as expected- hgb 8.4, monitor   · ID - surgical prophylaxis antibiotics completed  · FEN - cardiac diet with 1500 ml fluid restriction     - SSI, A1c 6.2     - bowel med - Lactulose x1     - increase KCl 20 mEq tid  · GI prophylaxis - Protonix 40 mg bid  · VTE prophylaxis - SCD and CLARISSE hose  · Disposition -  Continue PT/OT. Home health at discharge.       Back on IV amiodarone        Ena Quintana MD    6/5/2022  8:42 AM

## 2022-06-05 NOTE — PROGRESS NOTES
Assessment completed as charted, see flow sheet. Awake, alert. Afib rate 90's-110's. Wanting to go home.

## 2022-06-05 NOTE — PROGRESS NOTES
Patient remains alert and oriented. Evening medications given as ordered. Patient's 's, ST. Patient voiding adequately after administration of Lasix. Patient does endorse incisional discomfort. Patient requesting sleep aid. 1 oxycodone, PRN Melatonin given. Patient repositioned for comfort. Call light in reach, encouraged to call for any needs. Bed in lowest position, med alarm on, side rails up X 3. Will continue to monitor.

## 2022-06-05 NOTE — PLAN OF CARE
Implement safety measures based on patient assessment     Problem: Skin/Tissue Integrity  Goal: Absence of new skin breakdown  Description: 1. Monitor for areas of redness and/or skin breakdown  2. Assess vascular access sites hourly  3. Every 4-6 hours minimum:  Change oxygen saturation probe site  4. Every 4-6 hours:  If on nasal continuous positive airway pressure, respiratory therapy assess nares and determine need for appliance change or resting period.   Outcome: Progressing

## 2022-06-05 NOTE — CARE COORDINATION
Case management is following for discharge planning. A discharge order is noted with the plan to return home with his daughter and [facility] [address]. The chart was reviewed and spoke with primary RN. Mr. Davis is still tachycardic and back in A-fib. Amiodarone gtt restarted. Pt remains in the ICU.     Electronically signed by REYNALDO Aguirre RN-[facility]  Case Management  [phone]

## 2022-06-05 NOTE — PROGRESS NOTES
Discharge instructions and prescriptions given to pt and wife. Both verbalize understanding. Discharged per WC to private vehicle.

## 2022-06-06 RX ORDER — LISINOPRIL 2.5 MG/1
2.5 TABLET ORAL DAILY
Qty: 30 TABLET | Refills: 3 | Status: SHIPPED | OUTPATIENT
Start: 2022-06-06

## 2022-06-06 RX ORDER — POTASSIUM CHLORIDE 20 MEQ/1
20 TABLET, EXTENDED RELEASE ORAL DAILY
Qty: 14 TABLET | Refills: 0 | Status: SHIPPED | OUTPATIENT
Start: 2022-06-06 | End: 2022-07-27 | Stop reason: ALTCHOICE

## 2022-06-06 RX ORDER — METOPROLOL TARTRATE 50 MG/1
50 TABLET, FILM COATED ORAL 2 TIMES DAILY
Qty: 60 TABLET | Refills: 3 | Status: SHIPPED | OUTPATIENT
Start: 2022-06-06

## 2022-06-06 RX ORDER — ATORVASTATIN CALCIUM 40 MG/1
40 TABLET, FILM COATED ORAL NIGHTLY
Qty: 30 TABLET | Refills: 3 | Status: SHIPPED | OUTPATIENT
Start: 2022-06-06

## 2022-06-06 NOTE — DISCHARGE SUMMARY
DISCHARGE SUMMARY    Patient ID: Zofia Giron  MRN:  [id number]  YOB: 1960      Admission Date:  5/31/2022  5:14 AM  Discharge Date:  6/5/2022  2:30 PM     Principle Diagnosis:  Aortic stenosis with trileaflet valve    Secondary Diagnosis:  Principal Problem: Aortic stenosis with trileaflet valve  Aortic valve insufficiency  Class III chronic systolic and diastolic heart failure  Single-vessel coronary artery disease involving the proximal RCA  Morbid obesity  H/o paroxysmal atrial fibrillation  Chronic hypertension  Preoperative anemia  Acute blood loss anemia    Procedure: Aortic valve replacement with 25-mm Pereyra Inspiris Resilia Bovine pericardial bioprosthesis; coronary artery bypass grafting x1 - reverse aortic coronary saphenous vein graft to proximal posterior descending coronary artery; transesophageal echocardiography; total cardiopulmonary bypass; endoscopic saphenous vein harvest from right calf; placement of 35-mm AtriClip II to the base of the left atrial appendage; and placement of sternal plates x2 (Jennifer). History:  The patient is a 60 y.o.  male who has developed progressive exertional dyspnea. Workup of this has found him to have both moderate-to-severe aortic valve stenosis along with aortic valve regurgitation as well as single-vessel coronary artery disease involving the proximal right coronary artery. In deference, this constellation of event, he was referred for aortic valve replacement and he elected to not pursue TAVR and to pursue a surgical option for the advantage of the increased durability of the surgically implanted bioprosthetic valve. Hospital Course: The patient underwent elective AVR + CABG x 1 on May 31, 2022. In surgery, the left heart was mildly hypertrophic with an ejection fraction around 45%. Preoperative SENA showed moderate aortic valve regurgitation and severe stenosis with moderate annular calcium.  Postoperative echo showed ejection fraction around 50% or greater. The prosthetic valve was located at the annular location and showed zero paravalvular leak and a tiny central jet of regurgitation typical for a newly implanted pericardial bioprosthesis. His operative course was uncomplicated. He transferred to ICU for ongoing care. He was extubated the day of surgery. He transitioned to progressive care on POD1. He still had significant incisional pain which improved when roxicodone was started. PT/OT were consulted to assist with discharge planning. On POD 2, as expected, he went into atrial fibrillation and was started on an Amiodarone protocol. He had been on Amiodarone preoperatively for his paroxysmal atrial fibrillation. He convert in and out of atrial fibrillation. Beta blocker dose was increased to 50 mg bid. His preop apixaban was restarted on POD 3. His bladder and bowels were functioning normally. His incisional pain was controlled on current regimen. He was ambulating around the ICU. He was deemed medically ready for discharge on POD 5.     Consults:  IP CONSULT TO CARDIAC REHAB  IP CONSULT TO CASE MANAGEMENT  IP CONSULT TO SOCIAL WORK  IP CONSULT TO DIETITIAN  IP CONSULT TO HOME CARE NEEDS     Significant Diagnostic Studies:   Chest X-ray  EKG    Treatments: IV hydration, antibiotics: Ancef and vancomycin, cardiac meds: lisinopril (generic), metoprolol, furosemide and amiodarone, anticoagulation: ASA, Plavix and apixaban and therapies: PT and OT    LABS:  Recent Labs     06/04/22  0412 06/05/22  0434   WBC 7.7 9.1   HGB 9.1* 9.4*   HCT 27.7* 27.7*    225                                                                  Recent Labs     06/04/22 0412 06/05/22  0434    139   K 4.2 3.9    102   CO2 23 25   BUN 24* 22*   CREATININE 1.2 1.1   GLUCOSE 112* 130*       Condition at discharge: Stable     Disposition:  Home with home healthcare    Physical Exam on discharge day:   /78   Pulse 98   Temp 98.2 °F as: ALDACTONE     TYLENOL/CODEINE #3 300-30 MG per tablet  Generic drug: acetaminophen-codeine           Where to Get Your Medications      You can get these medications from any pharmacy    Bring a paper prescription for each of these medications  · aspirin 325 mg EC tablet  · atorvastatin 40 MG tablet  · gabapentin 300 MG capsule  · lisinopril 2.5 MG tablet  · metoprolol tartrate 50 MG tablet  · oxyCODONE 5 MG immediate release tablet  · potassium chloride 20 MEQ extended release tablet       Allergies   Allergen Reactions    Cymbalta [Duloxetine Hcl] Other (See Comments)     Didn't like the way he felt while taking them \" had very bad thoughts\"    Tramadol Other (See Comments)     Didn't like the way he felt and had \"very bad thoughts\"     Discharge Instructions: Activity: No heavy lifting greater than 5 pounds for 6 weeks post-op. Do not use arms to get up from a seated position. Instead rock in chair to give yourself momentum to sit up. No driving  Diet: cardiac diet  Wound Care: keep wound clean and dry and open to air. Use antibacterial soap and clean washcloth to cleanse incisional wounds daily. Follow up Visits:  Dr Elvie Briggs in 1-2 weeks  Dr Davonte Lee in 2-3 weeks  Dr Chiara Cespedes in 3-4 weeks      Referred to cardiac rehab for Phase II and will follow up as an outpatient.         RENETTA Christensen  6/6/2022  3:09 PM

## 2022-06-15 ENCOUNTER — OFFICE VISIT (OUTPATIENT)
Dept: CARDIOTHORACIC SURGERY | Age: 62
End: 2022-06-15

## 2022-06-15 VITALS
WEIGHT: 307 LBS | HEART RATE: 71 BPM | BODY MASS INDEX: 39.4 KG/M2 | SYSTOLIC BLOOD PRESSURE: 128 MMHG | TEMPERATURE: 97.4 F | OXYGEN SATURATION: 98 % | DIASTOLIC BLOOD PRESSURE: 68 MMHG | HEIGHT: 74 IN

## 2022-06-15 DIAGNOSIS — Z09 FOLLOW-UP SURGERY CARE: Primary | ICD-10-CM

## 2022-06-15 PROCEDURE — 99024 POSTOP FOLLOW-UP VISIT: CPT | Performed by: THORACIC SURGERY (CARDIOTHORACIC VASCULAR SURGERY)

## 2022-06-15 NOTE — PROGRESS NOTES
Mr. Davis returns for his first postoperative visit subsequent to his aortic valve replacement and coronary bypass procedure. He looks great. He tells me he is out walking around comfortably. He is only going about a half a block at a time and is stopping artificially but not because he feels tired or poorly. I encouraged him to walk as far as long as he is comfortable in doing so twice per day unless it is overly hot outside. On exam he looks great. His chest incision is healing nicely. The operative dressing was almost completely off on its own. He has perspired and that is what helped to release the dressing so early. The dressings on his leg however are completely intact and not yet ready to come off. All wounds are healing well. He has some mild edema in the ankle involved with the saphenous vein harvest and none in the opposite leg. Lungs are clear bilaterally. His cardiac auscultation is entirely normal.    Overall Geovanny Lowry is coming along beautifully. I encouraged him, as I mentioned above, to increase his walking is much as he is comfortable. I also told him is peripherally fine to resume driving when he feels comfortable in doing so. He reiterated the need to be careful of use of his arms and both moving his body around and then pushing, pulling and lifting until his breastbone has more fully healed. I have made no change in his current medications. He is going to be following up with Dr. Henok Oleary in the next couple of weeks. I plan to see him for a final visit in 6 weeks. Geovanny Lowry knows to call or return to office at any time prior to this should any new problems, questions or concerns arise for which we can be of help.

## 2022-06-27 ENCOUNTER — OFFICE VISIT (OUTPATIENT)
Dept: CARDIOLOGY CLINIC | Age: 62
End: 2022-06-27
Payer: MEDICARE

## 2022-06-27 VITALS
HEART RATE: 76 BPM | SYSTOLIC BLOOD PRESSURE: 112 MMHG | WEIGHT: 303 LBS | BODY MASS INDEX: 38.89 KG/M2 | HEIGHT: 74 IN | DIASTOLIC BLOOD PRESSURE: 70 MMHG

## 2022-06-27 DIAGNOSIS — E78.00 PURE HYPERCHOLESTEROLEMIA: ICD-10-CM

## 2022-06-27 DIAGNOSIS — I50.20 SYSTOLIC HEART FAILURE, UNSPECIFIED HF CHRONICITY (HCC): ICD-10-CM

## 2022-06-27 DIAGNOSIS — R06.02 SOB (SHORTNESS OF BREATH): Primary | ICD-10-CM

## 2022-06-27 PROCEDURE — 3017F COLORECTAL CA SCREEN DOC REV: CPT | Performed by: NURSE PRACTITIONER

## 2022-06-27 PROCEDURE — G8417 CALC BMI ABV UP PARAM F/U: HCPCS | Performed by: NURSE PRACTITIONER

## 2022-06-27 PROCEDURE — 1036F TOBACCO NON-USER: CPT | Performed by: NURSE PRACTITIONER

## 2022-06-27 PROCEDURE — G8427 DOCREV CUR MEDS BY ELIG CLIN: HCPCS | Performed by: NURSE PRACTITIONER

## 2022-06-27 PROCEDURE — 99214 OFFICE O/P EST MOD 30 MIN: CPT | Performed by: NURSE PRACTITIONER

## 2022-06-27 PROCEDURE — 1111F DSCHRG MED/CURRENT MED MERGE: CPT | Performed by: NURSE PRACTITIONER
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
## 2022-06-27 NOTE — PATIENT INSTRUCTIONS
Fu with Dr. Regina Leonard in approx one month and sooner if any cardiac issues   Blood work & TTE in approx  3 months    Overall the patient is stable from CV standpoint

## 2022-07-20 ENCOUNTER — HOSPITAL ENCOUNTER (OUTPATIENT)
Dept: NON INVASIVE DIAGNOSTICS | Age: 62
Discharge: HOME | End: 2022-07-20
Payer: MEDICARE

## 2022-07-20 DIAGNOSIS — R06.02 SOB (SHORTNESS OF BREATH): ICD-10-CM

## 2022-07-20 LAB
LV EF: 63 %
LVEF MODALITY: NORMAL

## 2022-07-20 PROCEDURE — C8929 TTE W OR WO FOL WCON,DOPPLER: HCPCS

## 2022-07-20 PROCEDURE — 6360000004 HC RX CONTRAST MEDICATION: Performed by: INTERNAL MEDICINE

## 2022-07-20 RX ADMIN — PERFLUTREN 1.65 MG: 6.52 INJECTION, SUSPENSION INTRAVENOUS at 15:54

## 2022-07-21 DIAGNOSIS — R06.02 SOB (SHORTNESS OF BREATH): ICD-10-CM

## 2022-07-21 DIAGNOSIS — I50.20 SYSTOLIC HEART FAILURE, UNSPECIFIED HF CHRONICITY (HCC): ICD-10-CM

## 2022-07-21 DIAGNOSIS — E78.00 PURE HYPERCHOLESTEROLEMIA: ICD-10-CM

## 2022-07-22 LAB
A/G RATIO: 1.3 (ref 1.1–2.2)
ALBUMIN SERPL-MCNC: 4.2 G/DL (ref 3.4–5)
ALP BLD-CCNC: 144 U/L (ref 40–129)
ALT SERPL-CCNC: 15 U/L (ref 10–40)
ANION GAP SERPL CALCULATED.3IONS-SCNC: 13 MMOL/L (ref 3–16)
AST SERPL-CCNC: 21 U/L (ref 15–37)
BILIRUB SERPL-MCNC: 0.5 MG/DL (ref 0–1)
BILIRUBIN DIRECT: <0.2 MG/DL (ref 0–0.3)
BILIRUBIN, INDIRECT: NORMAL MG/DL (ref 0–1)
BUN BLDV-MCNC: 16 MG/DL (ref 7–20)
CALCIUM SERPL-MCNC: 9.2 MG/DL (ref 8.3–10.6)
CHLORIDE BLD-SCNC: 102 MMOL/L (ref 99–110)
CHOLESTEROL, TOTAL: 136 MG/DL (ref 0–199)
CO2: 23 MMOL/L (ref 21–32)
CREAT SERPL-MCNC: 1.4 MG/DL (ref 0.8–1.3)
GFR AFRICAN AMERICAN: >60
GFR NON-AFRICAN AMERICAN: 51
GLUCOSE BLD-MCNC: 108 MG/DL (ref 70–99)
HCT VFR BLD CALC: 36.3 % (ref 40.5–52.5)
HDLC SERPL-MCNC: 44 MG/DL (ref 40–60)
HEMOGLOBIN: 11.9 G/DL (ref 13.5–17.5)
LDL CHOLESTEROL CALCULATED: 69 MG/DL
MAGNESIUM: 2.2 MG/DL (ref 1.8–2.4)
MCH RBC QN AUTO: 27.3 PG (ref 26–34)
MCHC RBC AUTO-ENTMCNC: 32.7 G/DL (ref 31–36)
MCV RBC AUTO: 83.4 FL (ref 80–100)
PDW BLD-RTO: 15.6 % (ref 12.4–15.4)
PLATELET # BLD: 398 K/UL (ref 135–450)
PLATELET SLIDE REVIEW: ADEQUATE
PMV BLD AUTO: 8.8 FL (ref 5–10.5)
POTASSIUM SERPL-SCNC: 4.5 MMOL/L (ref 3.5–5.1)
PRO-BNP: 245 PG/ML (ref 0–124)
RBC # BLD: 4.35 M/UL (ref 4.2–5.9)
SLIDE REVIEW: ABNORMAL
SODIUM BLD-SCNC: 138 MMOL/L (ref 136–145)
TOTAL PROTEIN: 7.4 G/DL (ref 6.4–8.2)
TRIGL SERPL-MCNC: 114 MG/DL (ref 0–150)
TSH REFLEX FT4: 1.62 UIU/ML (ref 0.27–4.2)
VITAMIN D 25-HYDROXY: 28.1 NG/ML
VLDLC SERPL CALC-MCNC: 23 MG/DL
WBC # BLD: 6.7 K/UL (ref 4–11)

## 2022-07-27 ENCOUNTER — OFFICE VISIT (OUTPATIENT)
Dept: CARDIOTHORACIC SURGERY | Age: 62
End: 2022-07-27

## 2022-07-27 VITALS
SYSTOLIC BLOOD PRESSURE: 136 MMHG | HEIGHT: 74 IN | TEMPERATURE: 98.3 F | WEIGHT: 303 LBS | DIASTOLIC BLOOD PRESSURE: 68 MMHG | OXYGEN SATURATION: 98 % | HEART RATE: 75 BPM | BODY MASS INDEX: 38.89 KG/M2

## 2022-07-27 DIAGNOSIS — Z09 FOLLOW-UP SURGERY CARE: Primary | ICD-10-CM

## 2022-07-27 PROCEDURE — 99024 POSTOP FOLLOW-UP VISIT: CPT | Performed by: THORACIC SURGERY (CARDIOTHORACIC VASCULAR SURGERY)
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
## 2022-08-10 ENCOUNTER — OFFICE VISIT (OUTPATIENT)
Dept: CARDIOLOGY CLINIC | Age: 62
End: 2022-08-10
Payer: MEDICARE

## 2022-08-10 VITALS
HEART RATE: 64 BPM | BODY MASS INDEX: 38.94 KG/M2 | HEIGHT: 74 IN | WEIGHT: 303.4 LBS | SYSTOLIC BLOOD PRESSURE: 110 MMHG | DIASTOLIC BLOOD PRESSURE: 60 MMHG

## 2022-08-10 DIAGNOSIS — E78.2 MIXED HYPERLIPIDEMIA: ICD-10-CM

## 2022-08-10 DIAGNOSIS — I35.0 AORTIC STENOSIS WITH TRILEAFLET VALVE: Primary | ICD-10-CM

## 2022-08-10 DIAGNOSIS — I50.21 ACUTE SYSTOLIC HEART FAILURE (HCC): ICD-10-CM

## 2022-08-10 DIAGNOSIS — I48.0 PAROXYSMAL ATRIAL FIBRILLATION (HCC): ICD-10-CM

## 2022-08-10 DIAGNOSIS — I10 ESSENTIAL HYPERTENSION: ICD-10-CM

## 2022-08-10 PROCEDURE — G8427 DOCREV CUR MEDS BY ELIG CLIN: HCPCS | Performed by: INTERNAL MEDICINE

## 2022-08-10 PROCEDURE — 99214 OFFICE O/P EST MOD 30 MIN: CPT | Performed by: INTERNAL MEDICINE

## 2022-08-10 PROCEDURE — G8417 CALC BMI ABV UP PARAM F/U: HCPCS | Performed by: INTERNAL MEDICINE

## 2022-08-10 PROCEDURE — 1036F TOBACCO NON-USER: CPT | Performed by: INTERNAL MEDICINE

## 2022-08-10 PROCEDURE — 3017F COLORECTAL CA SCREEN DOC REV: CPT | Performed by: INTERNAL MEDICINE

## 2022-08-10 PROCEDURE — 93000 ELECTROCARDIOGRAM COMPLETE: CPT | Performed by: INTERNAL MEDICINE

## 2022-08-10 RX ORDER — CLOPIDOGREL BISULFATE 75 MG/1
75 TABLET ORAL DAILY
Qty: 90 TABLET | Refills: 3 | Status: SHIPPED | OUTPATIENT
Start: 2022-08-10 | End: 2022-08-12 | Stop reason: SDUPTHER

## 2022-08-10 RX ORDER — OXYCODONE HYDROCHLORIDE 5 MG/1
5 TABLET ORAL EVERY 4 HOURS PRN
COMMUNITY

## 2022-08-10 ASSESSMENT — ENCOUNTER SYMPTOMS
BLOOD IN STOOL: 0
EYE DISCHARGE: 0
CHEST TIGHTNESS: 0
FACIAL SWELLING: 0
ABDOMINAL PAIN: 0
ABDOMINAL DISTENTION: 0
VOMITING: 0
SHORTNESS OF BREATH: 0
WHEEZING: 0
COLOR CHANGE: 0
COUGH: 0
BACK PAIN: 0

## 2022-08-10 NOTE — ASSESSMENT & PLAN NOTE
Remains in sinus, had left atrial clip. Discontinue amiodarone. Discontinue Eliquis. Start Plavix 75 daily given by pass surgery. Villa Patel

## 2022-08-12 ENCOUNTER — TELEPHONE (OUTPATIENT)
Dept: CARDIOLOGY CLINIC | Age: 62
End: 2022-08-12

## 2022-08-12 RX ORDER — CLOPIDOGREL BISULFATE 75 MG/1
75 TABLET ORAL DAILY
Qty: 90 TABLET | Refills: 3 | Status: SHIPPED | OUTPATIENT
Start: 2022-08-12 | End: 2022-08-12

## 2022-08-12 RX ORDER — CLOPIDOGREL BISULFATE 75 MG/1
75 TABLET ORAL DAILY
Qty: 90 TABLET | Refills: 3 | Status: ON HOLD | OUTPATIENT
Start: 2022-08-12 | End: 2022-10-08 | Stop reason: HOSPADM

## 2022-08-12 NOTE — TELEPHONE ENCOUNTER
Spoke with Gretel Tate, Plavix sent to [facility] since [facility] most likely will not fill since patient is outpatient. Verbalized understanding.

## 2022-08-12 NOTE — TELEPHONE ENCOUNTER
Patients daughter Britney Lanier called stating her dad was seen on 8.10.22 and his medication for Eliquis was switched. Patient uses  pharmacy and  will not fill the prescription. Patient would like the new prescription to be sent to [facility].  Please reach Britney Lanier to let her know the prescription has been sent

## 2022-09-26 ENCOUNTER — TELEPHONE (OUTPATIENT)
Dept: CARDIOLOGY CLINIC | Age: 62
End: 2022-09-26

## 2022-09-26 NOTE — TELEPHONE ENCOUNTER
[facility] Jimenez called for a CC for a right total knee replacement. They also need plavix management. Please advise.      Phone: [phone]  Fax: [phone]

## 2022-09-26 NOTE — TELEPHONE ENCOUNTER
PMH of CABGx 1 , is having knee replacement surgery, last OV 8/10/2022, will need clearance . Should we see him in office prior to the procedure.  Please advise

## 2022-09-26 NOTE — TELEPHONE ENCOUNTER
No need to be seen in the office. Okay to proceed with surgery. Hold Plavix prior to surgery per orthopedic surgery. Resume after surgery when considered appropriate.   Thank you

## 2022-10-05 ENCOUNTER — TELEPHONE (OUTPATIENT)
Dept: CARDIOLOGY CLINIC | Age: 62
End: 2022-10-05

## 2022-10-05 ENCOUNTER — APPOINTMENT (OUTPATIENT)
Dept: GENERAL RADIOLOGY | Age: 62
DRG: 812 | End: 2022-10-05
Payer: MEDICARE

## 2022-10-05 ENCOUNTER — HOSPITAL ENCOUNTER (INPATIENT)
Age: 62
LOS: 3 days | Discharge: HOME | DRG: 812 | End: 2022-10-08
Attending: STUDENT IN AN ORGANIZED HEALTH CARE EDUCATION/TRAINING PROGRAM | Admitting: INTERNAL MEDICINE
Payer: MEDICARE

## 2022-10-05 DIAGNOSIS — Z95.1 HX OF CABG: ICD-10-CM

## 2022-10-05 DIAGNOSIS — D50.9 IRON DEFICIENCY ANEMIA, UNSPECIFIED IRON DEFICIENCY ANEMIA TYPE: Primary | ICD-10-CM

## 2022-10-05 DIAGNOSIS — I50.9 HEART FAILURE, UNSPECIFIED HF CHRONICITY, UNSPECIFIED HEART FAILURE TYPE (HCC): Primary | ICD-10-CM

## 2022-10-05 DIAGNOSIS — D64.9 ANEMIA, UNSPECIFIED TYPE: ICD-10-CM

## 2022-10-05 PROBLEM — R06.09 DYSPNEA ON EXERTION: Status: ACTIVE | Noted: 2022-10-05

## 2022-10-05 LAB
A/G RATIO: 1.5 (ref 1.1–2.2)
ABO/RH: NORMAL
ALBUMIN SERPL-MCNC: 4.5 G/DL (ref 3.4–5)
ALP BLD-CCNC: 108 U/L (ref 40–129)
ALT SERPL-CCNC: 12 U/L (ref 10–40)
ANION GAP SERPL CALCULATED.3IONS-SCNC: 11 MMOL/L (ref 3–16)
ANTIBODY SCREEN: NORMAL
AST SERPL-CCNC: 17 U/L (ref 15–37)
BASOPHILS ABSOLUTE: 0.1 K/UL (ref 0–0.2)
BASOPHILS RELATIVE PERCENT: 0.9 %
BILIRUB SERPL-MCNC: 0.5 MG/DL (ref 0–1)
BUN BLDV-MCNC: 17 MG/DL (ref 7–20)
CALCIUM SERPL-MCNC: 9.2 MG/DL (ref 8.3–10.6)
CHLORIDE BLD-SCNC: 101 MMOL/L (ref 99–110)
CHP ED QC CHECK: YES
CO2: 24 MMOL/L (ref 21–32)
CREAT SERPL-MCNC: 1.3 MG/DL (ref 0.8–1.3)
EKG ATRIAL RATE: 72 BPM
EKG DIAGNOSIS: NORMAL
EKG P AXIS: 64 DEGREES
EKG P-R INTERVAL: 172 MS
EKG Q-T INTERVAL: 408 MS
EKG QRS DURATION: 100 MS
EKG QTC CALCULATION (BAZETT): 446 MS
EKG R AXIS: 44 DEGREES
EKG T AXIS: -15 DEGREES
EKG VENTRICULAR RATE: 72 BPM
EOSINOPHILS ABSOLUTE: 0.2 K/UL (ref 0–0.6)
EOSINOPHILS RELATIVE PERCENT: 3 %
FERRITIN: 30.9 NG/ML (ref 30–400)
GFR AFRICAN AMERICAN: >60
GFR NON-AFRICAN AMERICAN: 56
GLUCOSE BLD-MCNC: 118 MG/DL (ref 70–99)
HCT VFR BLD CALC: 28 % (ref 40.5–52.5)
HEMOCCULT STL QL: NEGATIVE
HEMOGLOBIN: 8.9 G/DL (ref 13.5–17.5)
INFLUENZA A: NOT DETECTED
INFLUENZA B: NOT DETECTED
INR BLD: 0.94 (ref 0.87–1.14)
LACTATE DEHYDROGENASE: <10 U/L (ref 100–190)
LYMPHOCYTES ABSOLUTE: 1.6 K/UL (ref 1–5.1)
LYMPHOCYTES RELATIVE PERCENT: 20.6 %
MCH RBC QN AUTO: 22.3 PG (ref 26–34)
MCHC RBC AUTO-ENTMCNC: 31.8 G/DL (ref 31–36)
MCV RBC AUTO: 70.1 FL (ref 80–100)
MONOCYTES ABSOLUTE: 1.1 K/UL (ref 0–1.3)
MONOCYTES RELATIVE PERCENT: 14 %
NEUTROPHILS ABSOLUTE: 4.6 K/UL (ref 1.7–7.7)
NEUTROPHILS RELATIVE PERCENT: 61.5 %
PDW BLD-RTO: 16.2 % (ref 12.4–15.4)
PLATELET # BLD: 342 K/UL (ref 135–450)
PMV BLD AUTO: 8.1 FL (ref 5–10.5)
POTASSIUM REFLEX MAGNESIUM: 4.1 MMOL/L (ref 3.5–5.1)
PRO-BNP: 181 PG/ML (ref 0–124)
PROTHROMBIN TIME: 12.5 SEC (ref 11.7–14.5)
RBC # BLD: 4 M/UL (ref 4.2–5.9)
SARS-COV-2 RNA, RT PCR: NOT DETECTED
SODIUM BLD-SCNC: 136 MMOL/L (ref 136–145)
TOTAL PROTEIN: 7.5 G/DL (ref 6.4–8.2)
TROPONIN: <0.01 NG/ML
WBC # BLD: 7.6 K/UL (ref 4–11)

## 2022-10-05 PROCEDURE — 82607 VITAMIN B-12: CPT

## 2022-10-05 PROCEDURE — 2580000003 HC RX 258: Performed by: INTERNAL MEDICINE

## 2022-10-05 PROCEDURE — 87636 SARSCOV2 & INF A&B AMP PRB: CPT

## 2022-10-05 PROCEDURE — G0378 HOSPITAL OBSERVATION PER HR: HCPCS

## 2022-10-05 PROCEDURE — 83615 LACTATE (LD) (LDH) ENZYME: CPT

## 2022-10-05 PROCEDURE — 83540 ASSAY OF IRON: CPT

## 2022-10-05 PROCEDURE — 86900 BLOOD TYPING SEROLOGIC ABO: CPT

## 2022-10-05 PROCEDURE — 86850 RBC ANTIBODY SCREEN: CPT

## 2022-10-05 PROCEDURE — 71045 X-RAY EXAM CHEST 1 VIEW: CPT

## 2022-10-05 PROCEDURE — 99285 EMERGENCY DEPT VISIT HI MDM: CPT

## 2022-10-05 PROCEDURE — 96372 THER/PROPH/DIAG INJ SC/IM: CPT

## 2022-10-05 PROCEDURE — 85025 COMPLETE CBC W/AUTO DIFF WBC: CPT

## 2022-10-05 PROCEDURE — 93005 ELECTROCARDIOGRAM TRACING: CPT | Performed by: STUDENT IN AN ORGANIZED HEALTH CARE EDUCATION/TRAINING PROGRAM

## 2022-10-05 PROCEDURE — 83010 ASSAY OF HAPTOGLOBIN QUANT: CPT

## 2022-10-05 PROCEDURE — 6370000000 HC RX 637 (ALT 250 FOR IP): Performed by: STUDENT IN AN ORGANIZED HEALTH CARE EDUCATION/TRAINING PROGRAM

## 2022-10-05 PROCEDURE — 36415 COLL VENOUS BLD VENIPUNCTURE: CPT

## 2022-10-05 PROCEDURE — 86901 BLOOD TYPING SEROLOGIC RH(D): CPT

## 2022-10-05 PROCEDURE — 1200000000 HC SEMI PRIVATE

## 2022-10-05 PROCEDURE — 83880 ASSAY OF NATRIURETIC PEPTIDE: CPT

## 2022-10-05 PROCEDURE — 82746 ASSAY OF FOLIC ACID SERUM: CPT

## 2022-10-05 PROCEDURE — 83550 IRON BINDING TEST: CPT

## 2022-10-05 PROCEDURE — 6360000002 HC RX W HCPCS: Performed by: INTERNAL MEDICINE

## 2022-10-05 PROCEDURE — 80053 COMPREHEN METABOLIC PANEL: CPT

## 2022-10-05 PROCEDURE — 85379 FIBRIN DEGRADATION QUANT: CPT

## 2022-10-05 PROCEDURE — 83921 ORGANIC ACID SINGLE QUANT: CPT

## 2022-10-05 PROCEDURE — 84484 ASSAY OF TROPONIN QUANT: CPT

## 2022-10-05 PROCEDURE — 82728 ASSAY OF FERRITIN: CPT

## 2022-10-05 PROCEDURE — 82306 VITAMIN D 25 HYDROXY: CPT

## 2022-10-05 PROCEDURE — 6370000000 HC RX 637 (ALT 250 FOR IP): Performed by: INTERNAL MEDICINE

## 2022-10-05 PROCEDURE — 85610 PROTHROMBIN TIME: CPT

## 2022-10-05 RX ORDER — LISINOPRIL 2.5 MG/1
2.5 TABLET ORAL DAILY
Status: DISCONTINUED | OUTPATIENT
Start: 2022-10-06 | End: 2022-10-08 | Stop reason: HOSPADM

## 2022-10-05 RX ORDER — ASPIRIN 81 MG/1
81 TABLET, CHEWABLE ORAL DAILY
Status: DISCONTINUED | OUTPATIENT
Start: 2022-10-06 | End: 2022-10-08 | Stop reason: HOSPADM

## 2022-10-05 RX ORDER — ACETAMINOPHEN 650 MG/1
650 SUPPOSITORY RECTAL EVERY 6 HOURS PRN
Status: DISCONTINUED | OUTPATIENT
Start: 2022-10-05 | End: 2022-10-08 | Stop reason: HOSPADM

## 2022-10-05 RX ORDER — SODIUM CHLORIDE 0.9 % (FLUSH) 0.9 %
5-40 SYRINGE (ML) INJECTION EVERY 12 HOURS SCHEDULED
Status: DISCONTINUED | OUTPATIENT
Start: 2022-10-05 | End: 2022-10-08 | Stop reason: HOSPADM

## 2022-10-05 RX ORDER — ENOXAPARIN SODIUM 100 MG/ML
30 INJECTION SUBCUTANEOUS 2 TIMES DAILY
Status: DISCONTINUED | OUTPATIENT
Start: 2022-10-05 | End: 2022-10-06

## 2022-10-05 RX ORDER — ATORVASTATIN CALCIUM 40 MG/1
40 TABLET, FILM COATED ORAL NIGHTLY
Status: DISCONTINUED | OUTPATIENT
Start: 2022-10-05 | End: 2022-10-08 | Stop reason: HOSPADM

## 2022-10-05 RX ORDER — FERROUS SULFATE 325(65) MG
325 TABLET ORAL 2 TIMES DAILY WITH MEALS
Status: DISCONTINUED | OUTPATIENT
Start: 2022-10-05 | End: 2022-10-05

## 2022-10-05 RX ORDER — SODIUM CHLORIDE 0.9 % (FLUSH) 0.9 %
5-40 SYRINGE (ML) INJECTION PRN
Status: DISCONTINUED | OUTPATIENT
Start: 2022-10-05 | End: 2022-10-08 | Stop reason: HOSPADM

## 2022-10-05 RX ORDER — FUROSEMIDE 40 MG/1
40 TABLET ORAL DAILY
Status: DISCONTINUED | OUTPATIENT
Start: 2022-10-06 | End: 2022-10-08 | Stop reason: HOSPADM

## 2022-10-05 RX ORDER — SODIUM CHLORIDE 9 MG/ML
INJECTION, SOLUTION INTRAVENOUS PRN
Status: DISCONTINUED | OUTPATIENT
Start: 2022-10-05 | End: 2022-10-08 | Stop reason: HOSPADM

## 2022-10-05 RX ORDER — POLYETHYLENE GLYCOL 3350 17 G/17G
17 POWDER, FOR SOLUTION ORAL DAILY PRN
Status: DISCONTINUED | OUTPATIENT
Start: 2022-10-05 | End: 2022-10-08 | Stop reason: HOSPADM

## 2022-10-05 RX ORDER — METOPROLOL TARTRATE 50 MG/1
50 TABLET, FILM COATED ORAL 2 TIMES DAILY
Status: DISCONTINUED | OUTPATIENT
Start: 2022-10-05 | End: 2022-10-08 | Stop reason: HOSPADM

## 2022-10-05 RX ORDER — ONDANSETRON 4 MG/1
4 TABLET, ORALLY DISINTEGRATING ORAL EVERY 8 HOURS PRN
Status: DISCONTINUED | OUTPATIENT
Start: 2022-10-05 | End: 2022-10-08 | Stop reason: HOSPADM

## 2022-10-05 RX ORDER — ACETAMINOPHEN 325 MG/1
650 TABLET ORAL EVERY 6 HOURS PRN
Status: DISCONTINUED | OUTPATIENT
Start: 2022-10-05 | End: 2022-10-08 | Stop reason: HOSPADM

## 2022-10-05 RX ORDER — PANTOPRAZOLE SODIUM 40 MG/1
40 TABLET, DELAYED RELEASE ORAL
Status: DISCONTINUED | OUTPATIENT
Start: 2022-10-06 | End: 2022-10-08 | Stop reason: HOSPADM

## 2022-10-05 RX ORDER — ONDANSETRON 2 MG/ML
4 INJECTION INTRAMUSCULAR; INTRAVENOUS EVERY 6 HOURS PRN
Status: DISCONTINUED | OUTPATIENT
Start: 2022-10-05 | End: 2022-10-08 | Stop reason: HOSPADM

## 2022-10-05 RX ADMIN — ATORVASTATIN CALCIUM 40 MG: 40 TABLET, FILM COATED ORAL at 20:56

## 2022-10-05 RX ADMIN — FERROUS SULFATE TAB 325 MG (65 MG ELEMENTAL FE) 325 MG: 325 (65 FE) TAB at 16:30

## 2022-10-05 RX ADMIN — SODIUM CHLORIDE, PRESERVATIVE FREE 10 ML: 5 INJECTION INTRAVENOUS at 21:00

## 2022-10-05 RX ADMIN — METOPROLOL TARTRATE 50 MG: 50 TABLET, FILM COATED ORAL at 20:56

## 2022-10-05 RX ADMIN — ENOXAPARIN SODIUM 30 MG: 100 INJECTION SUBCUTANEOUS at 20:56

## 2022-10-05 ASSESSMENT — ENCOUNTER SYMPTOMS
BLOOD IN STOOL: 0
PHOTOPHOBIA: 0
RECTAL PAIN: 0
BACK PAIN: 0
SHORTNESS OF BREATH: 1
VOMITING: 0
ABDOMINAL PAIN: 0
APNEA: 0
NAUSEA: 0

## 2022-10-05 ASSESSMENT — PAIN SCALES - GENERAL
PAINLEVEL_OUTOF10: 0

## 2022-10-05 ASSESSMENT — PAIN - FUNCTIONAL ASSESSMENT: PAIN_FUNCTIONAL_ASSESSMENT: NONE - DENIES PAIN

## 2022-10-05 NOTE — TELEPHONE ENCOUNTER
SOB    1) What type of symptoms are you having? SOB when walking       How long have you been having these symptoms? No energy and fatique     2) Any swelling? No    If so, where? 3) Any weight gain? No    What is your current weight? What is your normal (dry) weight? Please call Colin Franke the patient's daughter at [phone] to advise.

## 2022-10-05 NOTE — TELEPHONE ENCOUNTER
Spoke with patient's daughter. Patient coloring is gray and pale, no energy the moment he walks and fatigued. Hemoglobin low. Advised him to go to the ED to be evaluated. Hold plavix, aspirin, and lisinopril for now. Verbalized understanding.
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
## 2022-10-05 NOTE — ED NOTES
Report called to Tram Aguayo on 6S. No further questions at this time.       Diamante Clarke RN  10/05/22 8503

## 2022-10-06 ENCOUNTER — APPOINTMENT (OUTPATIENT)
Dept: CT IMAGING | Age: 62
DRG: 812 | End: 2022-10-06
Payer: MEDICARE

## 2022-10-06 ENCOUNTER — APPOINTMENT (OUTPATIENT)
Dept: VASCULAR LAB | Age: 62
DRG: 812 | End: 2022-10-06
Payer: MEDICARE

## 2022-10-06 PROBLEM — D50.9 IRON DEFICIENCY ANEMIA: Status: ACTIVE | Noted: 2022-10-06

## 2022-10-06 LAB
ANION GAP SERPL CALCULATED.3IONS-SCNC: 7 MMOL/L (ref 3–16)
BASOPHILS ABSOLUTE: 0 K/UL (ref 0–0.2)
BASOPHILS RELATIVE PERCENT: 0.7 %
BUN BLDV-MCNC: 17 MG/DL (ref 7–20)
C-REACTIVE PROTEIN: <3 MG/L (ref 0–5.1)
CALCIUM SERPL-MCNC: 9.4 MG/DL (ref 8.3–10.6)
CHLORIDE BLD-SCNC: 102 MMOL/L (ref 99–110)
CO2: 26 MMOL/L (ref 21–32)
CREAT SERPL-MCNC: 1.1 MG/DL (ref 0.8–1.3)
D DIMER: 1.78 UG/ML FEU (ref 0–0.6)
EOSINOPHILS ABSOLUTE: 0.2 K/UL (ref 0–0.6)
EOSINOPHILS RELATIVE PERCENT: 3.5 %
FOLATE: 13.71 NG/ML (ref 4.78–24.2)
GFR AFRICAN AMERICAN: >60
GFR NON-AFRICAN AMERICAN: >60
GLUCOSE BLD-MCNC: 176 MG/DL (ref 70–99)
HAPTOGLOBIN: 183 MG/DL (ref 30–200)
HCT VFR BLD CALC: 25.6 % (ref 40.5–52.5)
HCT VFR BLD CALC: 26.4 % (ref 40.5–52.5)
HEMOGLOBIN: 8.1 G/DL (ref 13.5–17.5)
HEMOGLOBIN: 8.1 G/DL (ref 13.5–17.5)
IRON SATURATION: 21 % (ref 20–50)
IRON: 94 UG/DL (ref 59–158)
LACTIC ACID: 2.1 MMOL/L (ref 0.4–2)
LV EF: 55 %
LVEF MODALITY: NORMAL
LYMPHOCYTES ABSOLUTE: 1.3 K/UL (ref 1–5.1)
LYMPHOCYTES RELATIVE PERCENT: 21.4 %
MCH RBC QN AUTO: 21.6 PG (ref 26–34)
MCH RBC QN AUTO: 22.1 PG (ref 26–34)
MCHC RBC AUTO-ENTMCNC: 30.5 G/DL (ref 31–36)
MCHC RBC AUTO-ENTMCNC: 31.6 G/DL (ref 31–36)
MCV RBC AUTO: 69.7 FL (ref 80–100)
MCV RBC AUTO: 70.7 FL (ref 80–100)
MONOCYTES ABSOLUTE: 0.6 K/UL (ref 0–1.3)
MONOCYTES RELATIVE PERCENT: 10.6 %
NEUTROPHILS ABSOLUTE: 3.9 K/UL (ref 1.7–7.7)
NEUTROPHILS RELATIVE PERCENT: 63.8 %
PDW BLD-RTO: 16.7 % (ref 12.4–15.4)
PDW BLD-RTO: 16.9 % (ref 12.4–15.4)
PLATELET # BLD: 313 K/UL (ref 135–450)
PLATELET # BLD: 317 K/UL (ref 135–450)
PMV BLD AUTO: 8.1 FL (ref 5–10.5)
PMV BLD AUTO: 8.3 FL (ref 5–10.5)
POTASSIUM REFLEX MAGNESIUM: 4.3 MMOL/L (ref 3.5–5.1)
PROCALCITONIN: 0.05 NG/ML (ref 0–0.15)
RBC # BLD: 3.67 M/UL (ref 4.2–5.9)
RBC # BLD: 3.74 M/UL (ref 4.2–5.9)
SODIUM BLD-SCNC: 135 MMOL/L (ref 136–145)
TOTAL IRON BINDING CAPACITY: 438 UG/DL (ref 260–445)
VITAMIN B-12: 1206 PG/ML (ref 211–911)
VITAMIN D 25-HYDROXY: 27 NG/ML
WBC # BLD: 6.1 K/UL (ref 4–11)
WBC # BLD: 6.5 K/UL (ref 4–11)

## 2022-10-06 PROCEDURE — 2580000003 HC RX 258: Performed by: INTERNAL MEDICINE

## 2022-10-06 PROCEDURE — 6370000000 HC RX 637 (ALT 250 FOR IP): Performed by: INTERNAL MEDICINE

## 2022-10-06 PROCEDURE — G0378 HOSPITAL OBSERVATION PER HR: HCPCS

## 2022-10-06 PROCEDURE — 83605 ASSAY OF LACTIC ACID: CPT

## 2022-10-06 PROCEDURE — 36415 COLL VENOUS BLD VENIPUNCTURE: CPT

## 2022-10-06 PROCEDURE — 6360000004 HC RX CONTRAST MEDICATION: Performed by: INTERNAL MEDICINE

## 2022-10-06 PROCEDURE — 99221 1ST HOSP IP/OBS SF/LOW 40: CPT | Performed by: INTERNAL MEDICINE

## 2022-10-06 PROCEDURE — C8929 TTE W OR WO FOL WCON,DOPPLER: HCPCS

## 2022-10-06 PROCEDURE — 71260 CT THORAX DX C+: CPT | Performed by: INTERNAL MEDICINE

## 2022-10-06 PROCEDURE — 99222 1ST HOSP IP/OBS MODERATE 55: CPT | Performed by: INTERNAL MEDICINE

## 2022-10-06 PROCEDURE — 85025 COMPLETE CBC W/AUTO DIFF WBC: CPT

## 2022-10-06 PROCEDURE — 96372 THER/PROPH/DIAG INJ SC/IM: CPT

## 2022-10-06 PROCEDURE — 6360000002 HC RX W HCPCS: Performed by: INTERNAL MEDICINE

## 2022-10-06 PROCEDURE — 85027 COMPLETE CBC AUTOMATED: CPT

## 2022-10-06 PROCEDURE — 1200000000 HC SEMI PRIVATE

## 2022-10-06 PROCEDURE — 80048 BASIC METABOLIC PNL TOTAL CA: CPT

## 2022-10-06 PROCEDURE — 93880 EXTRACRANIAL BILAT STUDY: CPT

## 2022-10-06 PROCEDURE — 84145 PROCALCITONIN (PCT): CPT

## 2022-10-06 PROCEDURE — 86140 C-REACTIVE PROTEIN: CPT

## 2022-10-06 RX ORDER — ENOXAPARIN SODIUM 100 MG/ML
100 INJECTION SUBCUTANEOUS ONCE
Status: COMPLETED | OUTPATIENT
Start: 2022-10-06 | End: 2022-10-06

## 2022-10-06 RX ADMIN — POLYETHYLENE GLYCOL-3350 AND ELECTROLYTES 2000 ML: 236; 6.74; 5.86; 2.97; 22.74 POWDER, FOR SOLUTION ORAL at 18:29

## 2022-10-06 RX ADMIN — PANTOPRAZOLE SODIUM 40 MG: 40 TABLET, DELAYED RELEASE ORAL at 06:09

## 2022-10-06 RX ADMIN — FUROSEMIDE 40 MG: 40 TABLET ORAL at 09:35

## 2022-10-06 RX ADMIN — PERFLUTREN 1.65 MG: 6.52 INJECTION, SUSPENSION INTRAVENOUS at 08:28

## 2022-10-06 RX ADMIN — IOPAMIDOL 80 ML: 755 INJECTION, SOLUTION INTRAVENOUS at 00:48

## 2022-10-06 RX ADMIN — ASPIRIN 81 MG 81 MG: 81 TABLET ORAL at 09:35

## 2022-10-06 RX ADMIN — METOPROLOL TARTRATE 50 MG: 50 TABLET, FILM COATED ORAL at 21:12

## 2022-10-06 RX ADMIN — SODIUM CHLORIDE, PRESERVATIVE FREE 10 ML: 5 INJECTION INTRAVENOUS at 09:36

## 2022-10-06 RX ADMIN — SODIUM CHLORIDE, PRESERVATIVE FREE 10 ML: 5 INJECTION INTRAVENOUS at 21:12

## 2022-10-06 RX ADMIN — METOPROLOL TARTRATE 50 MG: 50 TABLET, FILM COATED ORAL at 09:35

## 2022-10-06 RX ADMIN — LISINOPRIL 2.5 MG: 2.5 TABLET ORAL at 09:35

## 2022-10-06 RX ADMIN — ATORVASTATIN CALCIUM 40 MG: 40 TABLET, FILM COATED ORAL at 21:12

## 2022-10-06 RX ADMIN — ENOXAPARIN SODIUM 100 MG: 100 INJECTION SUBCUTANEOUS at 01:48

## 2022-10-06 ASSESSMENT — PAIN SCALES - GENERAL
PAINLEVEL_OUTOF10: 0
PAINLEVEL_OUTOF10: 0
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
## 2022-10-06 NOTE — PLAN OF CARE
Problem: Discharge Planning  Goal: Discharge to home or other facility with appropriate resources  Outcome: Progressing  Flowsheets (Taken 10/5/2022 2026)  Discharge to home or other facility with appropriate resources:   Identify barriers to discharge with patient and caregiver   Identify discharge learning needs (meds, wound care, etc)   Refer to discharge planning if patient needs post-hospital services based on physician order or complex needs related to functional status, cognitive ability or social support system   Arrange for needed discharge resources and transportation as appropriate   Arrange for interpreters to assist at discharge as needed     Problem: Pain  Goal: Verbalizes/displays adequate comfort level or baseline comfort level  Outcome: Progressing     Problem: ABCDS Injury Assessment  Goal: Absence of physical injury  Outcome: Progressing     Problem: Chronic Conditions and Co-morbidities  Goal: Patient's chronic conditions and co-morbidity symptoms are monitored and maintained or improved  Outcome: Progressing

## 2022-10-06 NOTE — DISCHARGE INSTRUCTIONS
Extra Heart Failure sites:   [email or web address]://[employer].[email or web address]/ --- this is American Heart Association interactive Healthier Living with Heart Failure guidebook. Please copy and paste link into search bar. Use your mouse to scroll through the pages. Lots and lots of info / tips    HF Marcell catalina  --- free smart phone catalina available for [employer] and [employer] [facility] [employer] [facility]. Use your phone to track sodium / fluid intake,  symptoms, weight, etc.    [employer] - website-- [employer] is a dialysis company. All dialysis patients follow a renal diet which IS low sodium!! This website offers free seasonal cookbooks.   Each quarter, they will release 25-30 new recipes with a breakdown of calories, sodium, glucose, etc    [email or web address] -- more free recipes
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
## 2022-10-06 NOTE — PROGRESS NOTES
Patient left unit at 0735 Echo. Patient aox4. Up ad gerardo. Denied pain.   No s/s of distress or discomfort at time of leaving unit

## 2022-10-06 NOTE — PROGRESS NOTES
Hospitalist Progress Note      Name:  Radha Davis /Age/Sex: 1960  (60 y.o. male)   MRN & CSN:  [id number] & [id number] Admission Date/Time: 10/5/2022  2:13 PM   Location:  [address] [place] [address] PCP: Vonnie Guajardo MD         Hospital Day: 2    Assessment and Plan:       Dyspnea on exertion: could be due to anemia, volume overload. CTA chest ruled out pulmonary embolism . He has been off Trena Prayer since  and placed on plavix and says that since then he has been short of breath  XR chest is clear  Continue home dose of lasix  Anemia vega has been started, noted that he has been eating a lot of ice, (pica symptom), check Iron, B12, folate. Check LD, Haptoglobin with hx of AS and bioprosthetic valve in place. Cardiology consulted  Check echocardiogram for further evaluation  Consult GI for evaluation of anemia  Continue ASA but change to baby dose  Hold Plavix until cleared by GI  Monitor CBC daily  Monitor closely on telemetry     Chronic systolic congestive heart failure, last echo showed improvement in EF, continue metoprolol 50 twice daily, lisinopril 2.5 mg once daily, Lasix 40 twice daily. History of aortic valve replacement, bioprosthetic valve   History of CAD, s/p CABG x1  History of paroxysmal atrial fibrillation, s/p left atrial appendage clip, was not previously on apixaban but off since 2022  Hyperlipidemia, continue statin  Hypertension, blood pressure well controlled  History of GERD, continue Protonix       Diet ADULT DIET; Regular; Low Fat/Low Chol/High Fiber/2 gm Na   DVT Prophylaxis [] Lovenox, []  Heparin, [x] SCDs, [] Ambulation   GI Prophylaxis [] PPI,  [] H2 Blocker,  [] Carafate,  [] Diet/Tube Feeds   Code Status Full Code   Disposition Patient requires continued admission due to    MDM [] Low, [] Moderate,[]  High  Patient's risk as above due to      History of Present Illness:      The patient was seen and examined at bedside  Has dyspnea on exertion  CTA chest no PE  No leg swelling   Has anemia   Denies blood in stool   Objective: Intake/Output Summary (Last 24 hours) at 10/6/2022 0946  Last data filed at 10/6/2022 0936  Gross per 24 hour   Intake 190 ml   Output 900 ml   Net -710 ml      Vitals:   Vitals:    10/06/22 0935   BP: 138/84   Pulse: 80   Resp: 18   Temp: 98.1 °F (36.7 °C)   SpO2: 98%     Physical Exam:   GEN Awake.  Alert , not in respiratory distress, not in pain  HEENT: PEERLA, , supple neck,   Chest: air entry equal bilaterally, no wheezing or crepitation  Heart: S1 and S2 heard, no murmur, no gallop or rub, regular rate  Abdomen: soft, ND , Nt, +BS  Extremities: no cyanosis, tenderness or erythema, peripheral pulses audible  Neurology: alert, oriented x3, able to move 4 limbs    Medications:   Medications:    atorvastatin  40 mg Oral Nightly    furosemide  40 mg Oral Daily    lisinopril  2.5 mg Oral Daily    metoprolol tartrate  50 mg Oral BID    pantoprazole  40 mg Oral QAM AC    sodium chloride flush  5-40 mL IntraVENous 2 times per day    aspirin  81 mg Oral Daily      Infusions:    sodium chloride       PRN Meds: sodium chloride flush, 5-40 mL, PRN  sodium chloride, , PRN  ondansetron, 4 mg, Q8H PRN   Or  ondansetron, 4 mg, Q6H PRN  polyethylene glycol, 17 g, Daily PRN  acetaminophen, 650 mg, Q6H PRN   Or  acetaminophen, 650 mg, Q6H PRN          Electronically signed by Santiago Bucio MD on 10/6/2022 at 9:46 AM

## 2022-10-06 NOTE — CONSULTS
[facility]  Cardiology Inpatient Consult Service  Consultation Note        Admit Date:  10/5/2022    Referring Physician: Petar Oates MD    Reason for Consultation/Chief Complaint: SOB    Subjective:   HPI:  Venu Ferguson is a 60 y.o. male w/PMH moderate AI, severe AS s/p AVR with CABG x 1 and TRAVIS clip on 05/31/22, HFrEF with recovery of EF to 60-65% (echo, 06/27/22), atrial fibrillation, HTN, HLD, former smoker (over 40 pack years), obesity, osteoarthritis, GERD, who presented to the ED on 10/05/22 w/a cc of SOB. Patient had a PCP visit w/SOB on 09/28/22 where PCP documented SOB upon exertion within walking 50 ft and w/out any symptoms of CHF. CXR, CBC, and CMP were ordered at that time. Hb was found to be decreased at 8.8 which was a change from patient's baseline. Patient reports that his SOB is associated with dizziness, lightheadedness, and diaphoresis. At the ED patient presented w/VSS and was found to have Hb decreased at 8.9. Troponin were negative and CXR w/low lung volumes, mild bibasilar atelectasis, and normal cardiac mediastinal silhouette status post sternotomy. Patient reported to have recent travel history to [place] w/swelling at his lower extremity where vein was removed for his cardiac surgery and additionally was reported to have CP at the ED.  Patient received ferrous sulfate and was subsequently admitted to the hospital.     Medications:   atorvastatin  40 mg Oral Nightly    furosemide  40 mg Oral Daily    lisinopril  2.5 mg Oral Daily    metoprolol tartrate  50 mg Oral BID    [START ON 10/6/2022] pantoprazole  40 mg Oral QAM AC    sodium chloride flush  5-40 mL IntraVENous 2 times per day    enoxaparin  30 mg SubCUTAneous BID    [START ON 10/6/2022] aspirin  81 mg Oral Daily       IV drips:   sodium chloride         PRN:  perflutren lipid microspheres, sodium chloride flush, sodium chloride, ondansetron **OR** ondansetron, polyethylene glycol, acetaminophen **OR** acetaminophen      Objective:     Vitals:    10/05/22 1900 10/05/22 1930 10/05/22 2009 10/05/22 2011   BP: (!) 114/57 106/65  125/79   Pulse: 73 76  77   Resp: 14 24 22   Temp:    98.1 °F (36.7 °C)   TempSrc:    Oral   SpO2: 100% 100%  99%   Weight:   (!) 311 lb 3.2 oz (141.2 kg)    Height:   6' 2\" (1.88 m)      No intake or output data in the 24 hours ending 10/05/22 2033  No intake/output data recorded. Wt Readings from Last 3 Encounters:   10/05/22 (!) 311 lb 3.2 oz (141.2 kg)   08/10/22 (!) 303 lb 6.4 oz (137.6 kg)   07/27/22 (!) 303 lb (137.4 kg)       Admit Wt: Weight: (!) 309 lb 11.9 oz (140.5 kg)   Todays Wt: Weight: (!) 311 lb 3.2 oz (141.2 kg)    TELEMETRY: Personally interpreted Sinus     Physical Exam:     General:  Awake, alert, NAD  Skin:  Warm and dry  Chest:  Clear to auscultation, respiration normal  Cardiovascular:  RRR S1S2, no murmur appreciated  Abdomen:  Soft, non-distended, non-tender  Extremities:  no edema at bilateral extremities      Objective:  Vital signs: (most recent): Blood pressure 138/84, pulse 80, temperature 98.1 °F (36.7 °C), temperature source Oral, resp. rate 18, height 6' 2\" (1.88 m), weight (!) 304 lb 14.4 oz (138.3 kg), SpO2 98 %. Labs:   Recent Labs     10/05/22  1428      K 4.1   BUN 17   CREATININE 1.3      CO2 24   GLUCOSE 118*   CALCIUM 9.2     Recent Labs     10/05/22  1428   WBC 7.6   HGB 8.9*   HCT 28.0*      MCV 70.1*     No results for input(s): CHOLTOT, TRIG, HDL, CHOLHDL, LDL in the last 72 hours. Invalid input(s): [address] [facility],[address], [address] [address]  Recent Labs     10/05/22  1428   INR 0.94     Recent Labs     10/05/22  1428   TROPONINI <0.01     No results for input(s): BNP in the last 72 hours. No results for input(s): NTPROBNP in the last 72 hours. No results for input(s): TSH in the last 72 hours. Imaging:   I personally reviewed imaging studies including CXR, Stress test, TTE/SENA.       Assessment & Plan:     #Anemia s/p s/p AVR with CABG x 1 and TRAVIS clip on 05/31/22  -w/decreased Hb from baseline and presentation of SOB upon exertion w/dizziness, diaphoresis and without any presentation of known bleeding.  -possibly 2/2 GI bleed   -iron studies, haptoglobin pending  -LD within normal limits  -GI consultation  -Pulm consultation  -no Eliquis or plavix  -aspirin at this time     Maria Eugenia Zimmer MD, PGY-1  Thank you for allowing to us to participate in the care of Michelle Narayanan. Please call our service with questions. All questions and concerns were addressed to the patient/family. Alternatives to my treatment were discussed. The note was completed using EMR. Every effort was made to ensure accuracy; however, inadvertent computerized transcription errors may be present. I have personally reviewed the reports and images of labs, radiological studies, cardiac studies including ECG's and telemetry, current and old medical records. Denny Khan MD, [facility], [facility] [address]    10/5/2022 8:33 PM

## 2022-10-07 ENCOUNTER — ANESTHESIA (OUTPATIENT)
Dept: ENDOSCOPY | Age: 62
DRG: 812 | End: 2022-10-07
Payer: MEDICARE

## 2022-10-07 ENCOUNTER — ANESTHESIA EVENT (OUTPATIENT)
Dept: ENDOSCOPY | Age: 62
DRG: 812 | End: 2022-10-07
Payer: MEDICARE

## 2022-10-07 LAB
A/G RATIO: 1.4 (ref 1.1–2.2)
ALBUMIN SERPL-MCNC: 4.2 G/DL (ref 3.4–5)
ALP BLD-CCNC: 109 U/L (ref 40–129)
ALT SERPL-CCNC: 13 U/L (ref 10–40)
ANION GAP SERPL CALCULATED.3IONS-SCNC: 12 MMOL/L (ref 3–16)
AST SERPL-CCNC: 18 U/L (ref 15–37)
BASOPHILS ABSOLUTE: 0.1 K/UL (ref 0–0.2)
BASOPHILS RELATIVE PERCENT: 0.9 %
BILIRUB SERPL-MCNC: 0.5 MG/DL (ref 0–1)
BUN BLDV-MCNC: 13 MG/DL (ref 7–20)
CALCIUM SERPL-MCNC: 9.2 MG/DL (ref 8.3–10.6)
CHLORIDE BLD-SCNC: 101 MMOL/L (ref 99–110)
CO2: 25 MMOL/L (ref 21–32)
CREAT SERPL-MCNC: 1.1 MG/DL (ref 0.8–1.3)
EOSINOPHILS ABSOLUTE: 0.2 K/UL (ref 0–0.6)
EOSINOPHILS RELATIVE PERCENT: 3.3 %
GFR AFRICAN AMERICAN: >60
GFR NON-AFRICAN AMERICAN: >60
GLUCOSE BLD-MCNC: 101 MG/DL (ref 70–99)
HCT VFR BLD CALC: 25.7 % (ref 40.5–52.5)
HEMOGLOBIN: 8.1 G/DL (ref 13.5–17.5)
LACTIC ACID: 2 MMOL/L (ref 0.4–2)
LYMPHOCYTES ABSOLUTE: 1.4 K/UL (ref 1–5.1)
LYMPHOCYTES RELATIVE PERCENT: 20.7 %
MCH RBC QN AUTO: 22 PG (ref 26–34)
MCHC RBC AUTO-ENTMCNC: 31.6 G/DL (ref 31–36)
MCV RBC AUTO: 69.7 FL (ref 80–100)
MONOCYTES ABSOLUTE: 0.8 K/UL (ref 0–1.3)
MONOCYTES RELATIVE PERCENT: 11.7 %
NEUTROPHILS ABSOLUTE: 4.2 K/UL (ref 1.7–7.7)
NEUTROPHILS RELATIVE PERCENT: 63.4 %
PDW BLD-RTO: 16.3 % (ref 12.4–15.4)
PLATELET # BLD: 300 K/UL (ref 135–450)
PMV BLD AUTO: 8.3 FL (ref 5–10.5)
POTASSIUM REFLEX MAGNESIUM: 4.4 MMOL/L (ref 3.5–5.1)
RBC # BLD: 3.68 M/UL (ref 4.2–5.9)
SODIUM BLD-SCNC: 138 MMOL/L (ref 136–145)
TOTAL PROTEIN: 7.1 G/DL (ref 6.4–8.2)
WBC # BLD: 6.6 K/UL (ref 4–11)

## 2022-10-07 PROCEDURE — G0378 HOSPITAL OBSERVATION PER HR: HCPCS

## 2022-10-07 PROCEDURE — 88305 TISSUE EXAM BY PATHOLOGIST: CPT

## 2022-10-07 PROCEDURE — 6360000002 HC RX W HCPCS: Performed by: INTERNAL MEDICINE

## 2022-10-07 PROCEDURE — 83605 ASSAY OF LACTIC ACID: CPT

## 2022-10-07 PROCEDURE — 3609010600 HC COLONOSCOPY POLYPECTOMY SNARE/COLD BIOPSY: Performed by: INTERNAL MEDICINE

## 2022-10-07 PROCEDURE — 0DB98ZX EXCISION OF DUODENUM, VIA NATURAL OR ARTIFICIAL OPENING ENDOSCOPIC, DIAGNOSTIC: ICD-10-PCS | Performed by: HOSPITALIST

## 2022-10-07 PROCEDURE — 2580000003 HC RX 258: Performed by: ANESTHESIOLOGY

## 2022-10-07 PROCEDURE — 36415 COLL VENOUS BLD VENIPUNCTURE: CPT

## 2022-10-07 PROCEDURE — 6370000000 HC RX 637 (ALT 250 FOR IP): Performed by: INTERNAL MEDICINE

## 2022-10-07 PROCEDURE — 3609013000 HC EGD TRANSORAL CONTROL BLEEDING ANY METHOD: Performed by: INTERNAL MEDICINE

## 2022-10-07 PROCEDURE — 2720000010 HC SURG SUPPLY STERILE: Performed by: INTERNAL MEDICINE

## 2022-10-07 PROCEDURE — 2709999900 HC NON-CHARGEABLE SUPPLY: Performed by: INTERNAL MEDICINE

## 2022-10-07 PROCEDURE — 3700000001 HC ADD 15 MINUTES (ANESTHESIA): Performed by: INTERNAL MEDICINE

## 2022-10-07 PROCEDURE — 7100000011 HC PHASE II RECOVERY - ADDTL 15 MIN: Performed by: INTERNAL MEDICINE

## 2022-10-07 PROCEDURE — 7100000010 HC PHASE II RECOVERY - FIRST 15 MIN: Performed by: INTERNAL MEDICINE

## 2022-10-07 PROCEDURE — 2580000003 HC RX 258: Performed by: INTERNAL MEDICINE

## 2022-10-07 PROCEDURE — 0DBP8ZZ EXCISION OF RECTUM, VIA NATURAL OR ARTIFICIAL OPENING ENDOSCOPIC: ICD-10-PCS | Performed by: INTERNAL MEDICINE

## 2022-10-07 PROCEDURE — 3700000000 HC ANESTHESIA ATTENDED CARE: Performed by: INTERNAL MEDICINE

## 2022-10-07 PROCEDURE — 0W3P8ZZ CONTROL BLEEDING IN GASTROINTESTINAL TRACT, VIA NATURAL OR ARTIFICIAL OPENING ENDOSCOPIC: ICD-10-PCS | Performed by: HOSPITALIST

## 2022-10-07 PROCEDURE — 80053 COMPREHEN METABOLIC PANEL: CPT

## 2022-10-07 PROCEDURE — 85025 COMPLETE CBC W/AUTO DIFF WBC: CPT

## 2022-10-07 PROCEDURE — 96374 THER/PROPH/DIAG INJ IV PUSH: CPT

## 2022-10-07 PROCEDURE — 6360000002 HC RX W HCPCS: Performed by: NURSE ANESTHETIST, CERTIFIED REGISTERED

## 2022-10-07 PROCEDURE — 2500000003 HC RX 250 WO HCPCS: Performed by: NURSE ANESTHETIST, CERTIFIED REGISTERED

## 2022-10-07 PROCEDURE — 3609012400 HC EGD TRANSORAL BIOPSY SINGLE/MULTIPLE: Performed by: INTERNAL MEDICINE

## 2022-10-07 PROCEDURE — 1200000000 HC SEMI PRIVATE

## 2022-10-07 RX ORDER — PROPOFOL 10 MG/ML
INJECTION, EMULSION INTRAVENOUS CONTINUOUS PRN
Status: DISCONTINUED | OUTPATIENT
Start: 2022-10-07 | End: 2022-10-07 | Stop reason: SDUPTHER

## 2022-10-07 RX ORDER — SODIUM CHLORIDE 9 MG/ML
INJECTION, SOLUTION INTRAVENOUS ONCE
Status: COMPLETED | OUTPATIENT
Start: 2022-10-07 | End: 2022-10-07

## 2022-10-07 RX ORDER — SODIUM CHLORIDE, SODIUM LACTATE, POTASSIUM CHLORIDE, CALCIUM CHLORIDE 600; 310; 30; 20 MG/100ML; MG/100ML; MG/100ML; MG/100ML
INJECTION, SOLUTION INTRAVENOUS CONTINUOUS PRN
Status: DISCONTINUED | OUTPATIENT
Start: 2022-10-07 | End: 2022-10-07 | Stop reason: SDUPTHER

## 2022-10-07 RX ORDER — LIDOCAINE HYDROCHLORIDE 20 MG/ML
INJECTION, SOLUTION EPIDURAL; INFILTRATION; INTRACAUDAL; PERINEURAL PRN
Status: DISCONTINUED | OUTPATIENT
Start: 2022-10-07 | End: 2022-10-07 | Stop reason: SDUPTHER

## 2022-10-07 RX ADMIN — PROPOFOL 150 MCG/KG/MIN: 10 INJECTION, EMULSION INTRAVENOUS at 12:17

## 2022-10-07 RX ADMIN — SODIUM CHLORIDE, PRESERVATIVE FREE 10 ML: 5 INJECTION INTRAVENOUS at 07:56

## 2022-10-07 RX ADMIN — SODIUM CHLORIDE, PRESERVATIVE FREE 10 ML: 5 INJECTION INTRAVENOUS at 21:07

## 2022-10-07 RX ADMIN — SODIUM CHLORIDE, SODIUM LACTATE, POTASSIUM CHLORIDE, AND CALCIUM CHLORIDE: .6; .31; .03; .02 INJECTION, SOLUTION INTRAVENOUS at 12:13

## 2022-10-07 RX ADMIN — ATORVASTATIN CALCIUM 40 MG: 40 TABLET, FILM COATED ORAL at 21:04

## 2022-10-07 RX ADMIN — POLYETHYLENE GLYCOL-3350 AND ELECTROLYTES 2000 ML: 236; 6.74; 5.86; 2.97; 22.74 POWDER, FOR SOLUTION ORAL at 00:35

## 2022-10-07 RX ADMIN — METOPROLOL TARTRATE 50 MG: 50 TABLET, FILM COATED ORAL at 21:04

## 2022-10-07 RX ADMIN — ONDANSETRON 4 MG: 2 INJECTION INTRAMUSCULAR; INTRAVENOUS at 00:37

## 2022-10-07 RX ADMIN — FUROSEMIDE 40 MG: 40 TABLET ORAL at 07:55

## 2022-10-07 RX ADMIN — SODIUM CHLORIDE, PRESERVATIVE FREE 10 ML: 5 INJECTION INTRAVENOUS at 00:37

## 2022-10-07 RX ADMIN — LISINOPRIL 2.5 MG: 2.5 TABLET ORAL at 07:55

## 2022-10-07 RX ADMIN — METOPROLOL TARTRATE 50 MG: 50 TABLET, FILM COATED ORAL at 07:55

## 2022-10-07 RX ADMIN — LIDOCAINE HYDROCHLORIDE 100 MG: 20 INJECTION, SOLUTION EPIDURAL; INFILTRATION; INTRACAUDAL; PERINEURAL at 12:17

## 2022-10-07 RX ADMIN — SODIUM CHLORIDE: 9 INJECTION, SOLUTION INTRAVENOUS at 12:00

## 2022-10-07 ASSESSMENT — ENCOUNTER SYMPTOMS
CHEST TIGHTNESS: 0
SHORTNESS OF BREATH: 1
WHEEZING: 0
SHORTNESS OF BREATH: 1
COUGH: 0

## 2022-10-07 ASSESSMENT — PAIN SCALES - GENERAL
PAINLEVEL_OUTOF10: 0

## 2022-10-07 ASSESSMENT — LIFESTYLE VARIABLES: SMOKING_STATUS: 1

## 2022-10-07 ASSESSMENT — PAIN - FUNCTIONAL ASSESSMENT: PAIN_FUNCTIONAL_ASSESSMENT: NONE - DENIES PAIN

## 2022-10-07 NOTE — PROGRESS NOTES
Patient is A&O x4.  RA, sat 98%. No complaints of pain or SOB. Respirations appear to be easy and unlabored. Lungs clear. Respirations easy with no complaints of cough. Cardiac monitor in place, current rhythm NSR. Right upper arm PIV intact and flushed. No complaints of nausea/vomiting/diarrhea. Up ad gerardo to the bathroom/BSC as needed. Patient taking in prep and states that stools are brown but clearing up and loose. Patient flushing stool. Voids per urinal.  Tolerating clear liquid diet. NPO after midnight for AM testing. Plan of care and safety measures reviewed with the patient. Call light in reach. Will continue to monitor.   Electronically signed by Rosio Mari RN on 10/6/2022 at 11:11 PM
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
## 2022-10-07 NOTE — ANESTHESIA POSTPROCEDURE EVALUATION
Department of Anesthesiology  Postprocedure Note    Patient: Chao Lofton  MRN: [id number]  YOB: 1960  Date of evaluation: 10/7/2022      Procedure Summary     Date: 10/07/22 Room / Location: [address] [place] [address] Kerri Pedro  / [facility]    Anesthesia Start: 6353 Anesthesia Stop: 1253    Procedures:       EGD BIOPSY      COLONOSCOPY POLYPECTOMY SNARE/COLD BIOPSY      EGD CONTROL HEMORRHAGE Diagnosis:       Anemia, unspecified type      (anemia)    Surgeons: Sherri Hummel MD Responsible Provider: Liana Cowart DO    Anesthesia Type: MAC ASA Status: 3          Anesthesia Type: No value filed.     Anthony Phase I: Anthony Score: 10    Anthony Phase II:        Anesthesia Post Evaluation    Patient location during evaluation: PACU  Patient participation: complete - patient participated  Level of consciousness: awake and alert  Airway patency: patent  Nausea & Vomiting: no nausea and no vomiting  Cardiovascular status: blood pressure returned to baseline  Respiratory status: acceptable  Hydration status: euvolemic
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
## 2022-10-07 NOTE — OP NOTE
[address]  and [facility]  Esophagogastroduodenoscopy Note        Patient: Matthew Sanchez  : 1960     Procedure: Esophagogastroduodenoscopy with biopsy, argon plasma coagulation    Date:  10/7/2022     Anesthesia: MAC    Indications: Unexplained anemia. Description of Procedure:  Informed consent was obtained from the patient after explanation of indications, benefits and possible risks and complications of the procedure. The procedure was done at bedside. Patient was placed in the left lateral decubitus position and the above IV sedation was administrered. The Olympus videoendoscope was placed in the patient's mouth and under direct visualization passed into the esophagus. The scope was then advanced into the stomach and then into the second portion of the duodenum. Esophagus showed no abnormalities  The stomach showed small hiatal hernia. The duodenum showed 1 nonbleeding AVM in the postbulbar duodenum. This was treated with APC. Biopsies were taken to look for villous atrophy    Retroflexed views of the cardia and fundus showed no other abnormalities. The scope was anteflexed and the stomach was decompressed, and the scope was completely withdrawn. The patient tolerated the procedure well.     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

## 2022-10-07 NOTE — PROGRESS NOTES
Hospitalist Progress Note      Name:  Queen Caity /Age/Sex: 1960  (60 y.o. male)   MRN & CSN:  [id number] & [id number] Admission Date/Time: 10/5/2022  2:13 PM   [facility]  [facility] PCP: Parker Castro MD         Hospital Day: 3    Assessment and Plan:       Dyspnea on exertion: could be due to anemia, volume overload. CTA chest ruled out pulmonary embolism . He has been off Vito Tu since  and placed on plavix and says that since then he has been short of breath  XR chest is clear  Continue home dose of lasix  Consult pulmonary specialist   Anemia vega has been started, noted that he has been eating a lot of ice, (pica symptom), check Iron, B12, folate. Check LD, Haptoglobin with hx of AS and bioprosthetic valve in place. Cardiology consulted  Check echocardiogram show ef 55%  Consult GI for evaluation of anemia  Plan for EGD and colonoscopy today   Continue ASA but change to baby dose  Hold Plavix until cleared by GI  Monitor CBC daily  Monitor closely on telemetry     Chronic systolic congestive heart failure, last echo showed improvement in EF, continue metoprolol 50 twice daily, lisinopril 2.5 mg once daily, Lasix 40 twice daily.   History of aortic valve replacement, bioprosthetic valve   History of CAD, s/p CABG x1  History of paroxysmal atrial fibrillation, s/p left atrial appendage clip, was not previously on apixaban but off since 2022  -No need for further cardiovascular work-up as per cardiology note   Hyperlipidemia, continue statin  Hypertension, blood pressure well controlled  History of GERD, continue Protonix       Diet Diet NPO Exceptions are: Sips of Water with Meds   DVT Prophylaxis [] Lovenox, []  Heparin, [x] SCDs, [] Ambulation   GI Prophylaxis [] PPI,  [] H2 Blocker,  [] Carafate,  [] Diet/Tube Feeds   Code Status Full Code   Disposition Patient requires continued admission due to    MDM [] Low, [] Moderate,[]  High  Patient's risk as above due to History of Present Illness: The patient was seen and examined at bedside   Still reports dyspnea on exertion  CTA chest no PE  Plan for EGD and colonscopy today   Consut pulmonary based on cardiology recommendation   No leg swelling   Has anemia   Denies blood in stool   Objective: Intake/Output Summary (Last 24 hours) at 10/7/2022 0838  Last data filed at 10/7/2022 0034  Gross per 24 hour   Intake 3610 ml   Output 900 ml   Net 2710 ml        Vitals:   Vitals:    10/07/22 0756   BP: (!) 153/88   Pulse: 97   Resp: 18   Temp: 97.8 °F (36.6 °C)   SpO2: 100%     Physical Exam:   GEN Awake.  Alert , not in respiratory distress, not in pain  HEENT: PEERLA, , supple neck,   Chest: air entry equal bilaterally, no wheezing or crepitation, decrease breathing bilaterally   Heart: S1 and S2 heard, no murmur, no gallop or rub, regular rate  Abdomen: soft, ND , Nt, +BS  Extremities: no cyanosis, tenderness or erythema, peripheral pulses audible  Neurology: alert, oriented x3, able to move 4 limbs    Medications:   Medications:    atorvastatin  40 mg Oral Nightly    furosemide  40 mg Oral Daily    lisinopril  2.5 mg Oral Daily    metoprolol tartrate  50 mg Oral BID    pantoprazole  40 mg Oral QAM AC    sodium chloride flush  5-40 mL IntraVENous 2 times per day    aspirin  81 mg Oral Daily      Infusions:    sodium chloride       PRN Meds: sodium chloride flush, 5-40 mL, PRN  sodium chloride, , PRN  ondansetron, 4 mg, Q8H PRN   Or  ondansetron, 4 mg, Q6H PRN  polyethylene glycol, 17 g, Daily PRN  acetaminophen, 650 mg, Q6H PRN   Or  acetaminophen, 650 mg, Q6H PRN        Electronically signed by Jose Ferreira MD on 10/7/2022 at 8:38 AM

## 2022-10-07 NOTE — OP NOTE
[address] [place] [address]  Colonoscopy Note            Patient: Albino Chawla  : 1960     Procedure: Colonoscopy with cold snare polypectomy    Date:  10/7/2022    Surgeon:  Serge Sherwood MD, MD    Anesthesia:  MAC    Indications: Unexplained anemia    Procedure: An informed consent was obtained from the patient after explanation of indications, benefits, possible risks and complications of the procedure. The patient was then taken to the endoscopy suite, placed in the left lateral decubitus position, and the above IV anesthesia was administered. A digital rectal examination was performed and revealed negative without mass, lesions or tenderness. The Olympus CFQ-180-AL video colonoscope was placed in the patient's rectum under digital direction and advanced to the cecum. The cecum was identified by characteristic anatomy and ballottment. The prep was fair. The scope was then withdrawn back through the cecum, ascending, transverse, descending and sigmoid colons. The scope was then withdrawn into the rectum and retroflexed. The scope was straightened, the colon was decompressed and the scope was withdrawn from the patient. The patient tolerated the procedure well and was taken to the PACU in good condition. Findings:    Cecum: Normal  Ascending Colon: Normal  Transverse Colon: Normal  Descending Colon: Normal  Sigmoid Colon: Mild diverticulosis  Rectum: 4 mm sessile polyp removed using a cold snare  Retroflexion showed medium size internal hemorrhoids    Estimated Blood Loss: None      Impression:    Sigmoid diverticulosis  Rectal polyp  Internal hemorrhoids    Recommendations:    Patient to call for biopsy results in 10 days. Repeat Colonoscopy in 5 years.     Serge Sherwood MD, MD   [address]  10/7/2022 Self

## 2022-10-07 NOTE — PROGRESS NOTES
Not waking patient for vitals at this time. Patient has been up and down all night to bathroom due to prep. HR and RR stable. Patient sleeping comfortably. No needs at this time. Patient resting comfortably in bed. Call light in reach. Will continue to monitor.    Electronically signed by Glen Shepherd RN on 10/7/2022 at 5:05 AM

## 2022-10-07 NOTE — PROGRESS NOTES
Patient complaining of nausea, medicated with zofran IVP. Will continue to monitor.   Electronically signed by Annia Oconnell RN on 10/7/2022 at 3:11 AM

## 2022-10-08 VITALS
TEMPERATURE: 97.8 F | HEART RATE: 84 BPM | BODY MASS INDEX: 39.13 KG/M2 | OXYGEN SATURATION: 99 % | RESPIRATION RATE: 18 BRPM | HEIGHT: 74 IN | DIASTOLIC BLOOD PRESSURE: 78 MMHG | SYSTOLIC BLOOD PRESSURE: 137 MMHG | WEIGHT: 304.9 LBS

## 2022-10-08 LAB
A/G RATIO: 1.1 (ref 1.1–2.2)
ALBUMIN SERPL-MCNC: 3.6 G/DL (ref 3.4–5)
ALP BLD-CCNC: 107 U/L (ref 40–129)
ALT SERPL-CCNC: 13 U/L (ref 10–40)
ANION GAP SERPL CALCULATED.3IONS-SCNC: 8 MMOL/L (ref 3–16)
AST SERPL-CCNC: 16 U/L (ref 15–37)
BASOPHILS ABSOLUTE: 0 K/UL (ref 0–0.2)
BASOPHILS RELATIVE PERCENT: 0.6 %
BILIRUB SERPL-MCNC: 0.3 MG/DL (ref 0–1)
BUN BLDV-MCNC: 18 MG/DL (ref 7–20)
CALCIUM SERPL-MCNC: 8.9 MG/DL (ref 8.3–10.6)
CHLORIDE BLD-SCNC: 102 MMOL/L (ref 99–110)
CO2: 25 MMOL/L (ref 21–32)
CREAT SERPL-MCNC: 1.2 MG/DL (ref 0.8–1.3)
EOSINOPHILS ABSOLUTE: 0.2 K/UL (ref 0–0.6)
EOSINOPHILS RELATIVE PERCENT: 2.6 %
GFR AFRICAN AMERICAN: >60
GFR NON-AFRICAN AMERICAN: >60
GLUCOSE BLD-MCNC: 121 MG/DL (ref 70–99)
HCT VFR BLD CALC: 25.5 % (ref 40.5–52.5)
HEMOGLOBIN: 7.9 G/DL (ref 13.5–17.5)
LYMPHOCYTES ABSOLUTE: 1.5 K/UL (ref 1–5.1)
LYMPHOCYTES RELATIVE PERCENT: 18.2 %
MCH RBC QN AUTO: 22 PG (ref 26–34)
MCHC RBC AUTO-ENTMCNC: 31.2 G/DL (ref 31–36)
MCV RBC AUTO: 70.6 FL (ref 80–100)
MONOCYTES ABSOLUTE: 0.9 K/UL (ref 0–1.3)
MONOCYTES RELATIVE PERCENT: 10.8 %
NEUTROPHILS ABSOLUTE: 5.7 K/UL (ref 1.7–7.7)
NEUTROPHILS RELATIVE PERCENT: 67.8 %
PDW BLD-RTO: 16.6 % (ref 12.4–15.4)
PLATELET # BLD: 301 K/UL (ref 135–450)
PMV BLD AUTO: 8.3 FL (ref 5–10.5)
POTASSIUM REFLEX MAGNESIUM: 4.1 MMOL/L (ref 3.5–5.1)
RBC # BLD: 3.61 M/UL (ref 4.2–5.9)
SODIUM BLD-SCNC: 135 MMOL/L (ref 136–145)
TOTAL PROTEIN: 6.8 G/DL (ref 6.4–8.2)
WBC # BLD: 8.4 K/UL (ref 4–11)

## 2022-10-08 PROCEDURE — 36415 COLL VENOUS BLD VENIPUNCTURE: CPT

## 2022-10-08 PROCEDURE — 85025 COMPLETE CBC W/AUTO DIFF WBC: CPT

## 2022-10-08 PROCEDURE — G0378 HOSPITAL OBSERVATION PER HR: HCPCS

## 2022-10-08 PROCEDURE — 6370000000 HC RX 637 (ALT 250 FOR IP): Performed by: INTERNAL MEDICINE

## 2022-10-08 PROCEDURE — 80053 COMPREHEN METABOLIC PANEL: CPT

## 2022-10-08 PROCEDURE — 99232 SBSQ HOSP IP/OBS MODERATE 35: CPT | Performed by: INTERNAL MEDICINE

## 2022-10-08 PROCEDURE — 2580000003 HC RX 258: Performed by: INTERNAL MEDICINE

## 2022-10-08 RX ORDER — FERROUS SULFATE 325(65) MG
325 TABLET ORAL
Status: DISCONTINUED | OUTPATIENT
Start: 2022-10-09 | End: 2022-10-08 | Stop reason: HOSPADM

## 2022-10-08 RX ORDER — LANOLIN ALCOHOL/MO/W.PET/CERES
325 CREAM (GRAM) TOPICAL
Qty: 15 TABLET | Refills: 0 | Status: SHIPPED | OUTPATIENT
Start: 2022-10-09 | End: 2023-02-08 | Stop reason: ALTCHOICE

## 2022-10-08 RX ADMIN — LISINOPRIL 2.5 MG: 2.5 TABLET ORAL at 09:19

## 2022-10-08 RX ADMIN — ASPIRIN 81 MG 81 MG: 81 TABLET ORAL at 09:19

## 2022-10-08 RX ADMIN — FUROSEMIDE 40 MG: 40 TABLET ORAL at 09:19

## 2022-10-08 RX ADMIN — SODIUM CHLORIDE, PRESERVATIVE FREE 10 ML: 5 INJECTION INTRAVENOUS at 09:19

## 2022-10-08 RX ADMIN — METOPROLOL TARTRATE 50 MG: 50 TABLET, FILM COATED ORAL at 09:19

## 2022-10-08 RX ADMIN — PANTOPRAZOLE SODIUM 40 MG: 40 TABLET, DELAYED RELEASE ORAL at 09:19

## 2022-10-08 ASSESSMENT — PAIN SCALES - GENERAL
PAINLEVEL_OUTOF10: 0
PAINLEVEL_OUTOF10: 0

## 2022-10-08 NOTE — PROGRESS NOTES
Tele removed from pt per pt request.  Patient due for discharge today and order expires later in day. Vitals stable.   Electronically signed by Nathan Ruiz RN on 10/8/2022 at 2:14 AM

## 2022-10-08 NOTE — DISCHARGE SUMMARY
Discharge Summary    Name:  Kasi Trotter /Age/Sex: 1960  (60 y.o. male)   MRN & CSN:  [id number] & [id number] Admission Date/Time: 10/5/2022  2:13 PM   Attending:  Maria Del Rosario Moncada MD Discharging Physician: Maria Del Rosario Moncada MD     Hospital Course:   Kasi Trotter is a 60 y.o.  male  who presents with Dyspnea on exertion    60 y.o. male with PMH   - w hx of AS, s/p 22 -- Aortic valve replacement with 25-mm Pereyra Inspiris Resilia Bovine pericardial bioprosthesis; coronary artery bypass grafting x1, TRAVIS AtriClip  - hx of atrial fibrillation Apixaban and amiodarone until 08/10/22(stopped as was in NSR and has TRAVIS clip) and and placed on Plavix  - He has been short of breath, worse with exertion x 1 month, seen at cardiology office and admiited directly to hospital .  Was found with acute anemia with hemoglobin around 8, cardiology and gastroenterology was consulted, patient underwent EGD and colonoscopy negative finding, GI recommend to start the patient on iron supplement on follow-up as outpatient if the anemia persists he may need capsule endoscopy. Cardiologist also see the patient I recommend no further work-up on recommend to stop Plavix  Continue on aspirin statin, furosemide metoprolol and lisinopril. Cardiologist cleared the patient to be discharged home  Echocardiogram showed ejection fraction 55%  CTA chest negative for PE  Venous Doppler negative for DVT    Chronic systolic congestive heart failure, last echo showed improvement in EF, continue metoprolol 50 twice daily, lisinopril 2.5 mg once daily, Lasix 40 twice daily.   History of aortic valve replacement, bioprosthetic valve   History of CAD, s/p CABG x1  History of paroxysmal atrial fibrillation, s/p left atrial appendage clip, was not previously on apixaban but off since 2022  -No need for further cardiovascular work-up as per cardiology note   Hyperlipidemia, continue statin  Hypertension, blood pressure well controlled  History of GERD, continue Protonix      The patient expressed appropriate understanding of and agreement with the discharge recommendations, medications, and plan. Consults this admission:  IP CONSULT TO CARDIOLOGY  IP CONSULT TO CARDIOLOGY  IP CONSULT TO HOSPITALIST  IP CONSULT TO GI  IP CONSULT TO GI  IP CONSULT TO PULMONOLOGY    Discharge Instruction:   Follow up appointments:   Primary care physician:  within 1  weeks    Diet:  cardiac diet   Activity: activity as tolerated  Disposition: Discharged to:   [x]Home, []HHC, []SNF, []Acute Rehab, []Hospice   Condition on discharge: Stable    Discharge Medications:        Medication List        ASK your doctor about these medications      aspirin 325 MG EC tablet  Take 1 tablet by mouth daily     atorvastatin 40 MG tablet  Commonly known as: LIPITOR  Take 1 tablet by mouth nightly     clopidogrel 75 MG tablet  Commonly known as: PLAVIX  Take 1 tablet by mouth in the morning. furosemide 40 MG tablet  Commonly known as: LASIX  Take 1 tablet by mouth daily     gabapentin 300 MG capsule  Commonly known as: NEURONTIN  Take 1 capsule by mouth 3 times daily for 30 days. lisinopril 2.5 MG tablet  Commonly known as: PRINIVIL;ZESTRIL  Take 1 tablet by mouth daily     metoprolol tartrate 50 MG tablet  Commonly known as: LOPRESSOR  Take 1 tablet by mouth 2 times daily     oxyCODONE 5 MG immediate release tablet  Commonly known as: ROXICODONE     pantoprazole 40 MG tablet  Commonly known as: PROTONIX     therapeutic multivitamin-minerals tablet              Objective Findings at Discharge:   /78   Pulse 84   Temp 97.8 °F (36.6 °C) (Oral)   Resp 18   Ht 6' 2\" (1.88 m)   Wt (!) 304 lb 14.4 oz (138.3 kg)   SpO2 99%   BMI 39.15 kg/m²            PHYSICAL EXAM   GEN    Awake.  Alert , not in respiratory distress, not in pain  HEENT: PEERLA, , supple neck,   Chest: air entry equal bilaterally, no wheezing or crepitation, decrease breathing bilaterally   Heart: S1 and S2 heard, no murmur, no gallop or rub, regular rate  Abdomen: soft, ND , Nt, +BS  Extremities: no cyanosis, tenderness or erythema, peripheral pulses audible  Neurology: alert, oriented x3, able to move 4 limbs    BMP/CBC  Recent Labs     10/06/22  1119 10/06/22  1311 10/07/22  0629 10/07/22  0630 10/08/22  0713   *  --  138  --  135*   K 4.3  --  4.4  --  4.1     --  101  --  102   CO2 26  --  25  --  25   BUN 17  --  13  --  18   CREATININE 1.1  --  1.1  --  1.2   WBC 6.1 6.5  --  6.6 8.4   HCT 26.4* 25.6*  --  25.7* 25.5*    317  --  300 301       IMAGING:      Discharge Time of 35 minutes    Electronically signed by Keyanna Yuen MD on 10/8/2022 at 10:41 AM

## 2022-10-08 NOTE — DISCHARGE INSTR - COC
Follow up appointments:   Primary care physician:  within 1  weeks     Diet:  cardiac diet   Activity: activity as tolerated

## 2022-10-08 NOTE — PROGRESS NOTES
[facility]  GI Progress Note          Alyssa Roberts is a 60 y.o. male patient. 1. Iron deficiency anemia, unspecified iron deficiency anemia type    2. Hx of CABG    3. Anemia, unspecified type        Admit Date: 10/5/2022    Subjective:       No further bleeding. Hb stable. Being discharged today. ROS:  Cardiovascular ROS: no chest pain or dyspnea on exertion  Gastrointestinal ROS: no abdominal pain, change in bowel habits, or black or bloody stools  Respiratory ROS: no cough, shortness of breath, or wheezing    Scheduled Meds:   atorvastatin  40 mg Oral Nightly    furosemide  40 mg Oral Daily    lisinopril  2.5 mg Oral Daily    metoprolol tartrate  50 mg Oral BID    pantoprazole  40 mg Oral QAM AC    sodium chloride flush  5-40 mL IntraVENous 2 times per day    aspirin  81 mg Oral Daily       Continuous Infusions:   sodium chloride         PRN Meds:  sodium chloride flush, sodium chloride, ondansetron **OR** ondansetron, polyethylene glycol, acetaminophen **OR** acetaminophen      Objective:       Patient Vitals for the past 24 hrs:   BP Temp Temp src Pulse Resp SpO2   10/08/22 0615 -- -- -- -- 18 --   10/08/22 0027 -- -- -- 81 18 --   10/07/22 2036 118/72 98 °F (36.7 °C) Oral 92 18 97 %   10/07/22 1702 103/63 97.5 °F (36.4 °C) Axillary 80 18 98 %   10/07/22 1354 131/81 97.2 °F (36.2 °C) Axillary 79 20 100 %   10/07/22 1338 121/75 -- -- 64 20 100 %   10/07/22 1320 117/72 -- -- 65 20 99 %   10/07/22 1310 114/75 -- -- 65 20 100 %   10/07/22 1300 (!) 105/94 97.1 °F (36.2 °C) Temporal 70 16 95 %       Exam:  VITALS:  /72   Pulse 81   Temp 98 °F (36.7 °C) (Oral)   Resp 18   Ht 6' 2\" (1.88 m)   Wt (!) 304 lb 14.4 oz (138.3 kg)   SpO2 97%   BMI 39.15 kg/m²   TEMPERATURE:  Current - Temp: 98 °F (36.7 °C);  Max - Temp  Av.5 °F (36.4 °C)  Min: 97.1 °F (36.2 °C)  Max: 98 °F (36.7 °C)    NAD  General appearance: alert, appears stated age, cooperative and no distress  Head: Normocephalic, without obvious abnormality, atraumatic  Neck: supple, symmetrical, trachea midline and thyroid not enlarged, symmetric, no tenderness/mass/nodules  CVS:  RRR, Nl s1s2  Lungs CTA Bilaterally, normal effort  Abdomen: soft, non-tender; bowel sounds normal; no masses,  no organomegaly  AAOx3, No asterixis or encephalopathy  Extremities: No edema. Recent Labs     10/06/22  1311 10/07/22  0630 10/08/22  0713   WBC 6.5 6.6 8.4   HGB 8.1* 8.1* 7.9*   HCT 25.6* 25.7* 25.5*   MCV 69.7* 69.7* 70.6*    300 301     Recent Labs     10/05/22  1428 10/06/22  1119 10/07/22  0629    135* 138   K 4.1 4.3 4.4    102 101   CO2 24 26 25   BUN 17 17 13   CREATININE 1.3 1.1 1.1     Recent Labs     10/05/22  1428 10/07/22  0629   AST 17 18   ALT 12 13   BILITOT 0.5 0.5   ALKPHOS 108 109     No results for input(s): LIPASE, AMYLASE in the last 72 hours. Recent Labs     10/05/22  1428 10/07/22  0629   PROT 7.5 7.1   INR 0.94  --      No results for input(s): PTT in the last 72 hours. No results for input(s): OCCULTBLD in the last 72 hours. Radiology review: New studies    Assessment:       Principal Problem:    Dyspnea on exertion  Active Problems:    Iron deficiency anemia  Resolved Problems:    * No resolved hospital problems. *      Anemia, hemoglobin stable. Negative EGD and colonoscopy. Recommendations:       Okay to be discharged home. If anemia persists despite iron supplementation, he may need a wireless capsule study. Follow-up as an outpatient if that becomes necessary.     Kris Eduardo MD  10/8/2022  9:11 AM

## 2022-10-08 NOTE — PROGRESS NOTES
Patient is due for discharge today. Not waking patient at this time for vitals. HR and RR stable. Patient appears to be sleeping comfortably. No needs at this time. Patient resting comfortably in bed. Call light in reach. Will continue to monitor.    Electronically signed by Al Maharaj RN on 10/8/2022 at 12:28 AM

## 2022-10-08 NOTE — PROGRESS NOTES
Discharge instructions explained to patient. Patient had no questions. IV removed.  Patient discharged via wheelchair

## 2022-10-08 NOTE — PLAN OF CARE
Problem: Discharge Planning  Goal: Discharge to home or other facility with appropriate resources  Outcome: Completed     Problem: Pain  Goal: Verbalizes/displays adequate comfort level or baseline comfort level  Outcome: Completed     Problem: ABCDS Injury Assessment  Goal: Absence of physical injury  Outcome: Completed     Problem: Chronic Conditions and Co-morbidities  Goal: Patient's chronic conditions and co-morbidity symptoms are monitored and maintained or improved  Outcome: Completed

## 2022-10-08 NOTE — PROGRESS NOTES
Patient is A&O x4.  RA, sat 98%. No complaints of pain or SOB. Respirations appear to be easy and unlabored. Lungs clear. Respirations easy with no complaints of cough. Cardiac monitor in place, current rhythm NSR. Right upper arm PIV intact and flushed. No complaints of nausea/vomiting/diarrhea. Up ad gerardo to the bathroom as needed. Tolerating regular diet. Plan of care and safety measures reviewed with the patient. Call light in reach. Will continue to monitor.   Electronically signed by Kavon Hogue RN on 10/7/2022 at 11:10 PM

## 2022-10-08 NOTE — PROGRESS NOTES
[facility]  Cardiology Inpatient Consult Service  Daily Progress Note        Admit Date:  10/5/2022    Referring Physician: Viki Lorenzana MD    Reason for Consultation/Chief Complaint:   Shortness of breath/GRANT as/CAD single-vessel CABG/A. fib a clip/bioprosthetic AVR    Subjective: Interval history:  ASIA    Medications:   atorvastatin  40 mg Oral Nightly    furosemide  40 mg Oral Daily    lisinopril  2.5 mg Oral Daily    metoprolol tartrate  50 mg Oral BID    pantoprazole  40 mg Oral QAM AC    sodium chloride flush  5-40 mL IntraVENous 2 times per day    aspirin  81 mg Oral Daily       IV drips:   sodium chloride         PRN:  sodium chloride flush, sodium chloride, ondansetron **OR** ondansetron, polyethylene glycol, acetaminophen **OR** acetaminophen      Objective:     Vitals:    10/07/22 1702 10/07/22 2036 10/08/22 0027 10/08/22 0615   BP: 103/63 118/72     Pulse: 80 92 81    Resp: 18 18 18 18   Temp: 97.5 °F (36.4 °C) 98 °F (36.7 °C)     TempSrc: Axillary Oral     SpO2: 98% 97%     Weight:       Height:           Intake/Output Summary (Last 24 hours) at 10/8/2022 0842  Last data filed at 10/8/2022 0615  Gross per 24 hour   Intake 1680 ml   Output --   Net 1680 ml     I/O last 3 completed shifts:   In: 8764 [P.O.:4080; I.V.:600]  Out: 900 [Urine:900]  Wt Readings from Last 3 Encounters:   10/06/22 (!) 304 lb 14.4 oz (138.3 kg)   08/10/22 (!) 303 lb 6.4 oz (137.6 kg)   07/27/22 (!) 303 lb (137.4 kg)       Admit Wt: Weight: (!) 309 lb 11.9 oz (140.5 kg)   Todays Wt: Weight: (!) 304 lb 14.4 oz (138.3 kg)    TELEMETRY: Personally interpreted Sinus     Physical Exam:     General:  Awake, alert, NAD  Skin:  Warm and dry  Neck:  -JVP  Chest:  Clear to auscultation, respiration normal  Cardiovascular:  RRR S1S2  Abdomen:  Soft -  Extremities:  - edema      Objective:  Vital signs: (most recent): Blood pressure 118/72, pulse 81, temperature 98 °F (36.7 °C), temperature source Oral, resp. rate 18, height 6' 2\" (1.88 m), weight (!) 304 lb 14.4 oz (138.3 kg), SpO2 97 %. Labs:   Recent Labs     10/05/22  1428 10/06/22  1119 10/07/22  0629    135* 138   K 4.1 4.3 4.4   BUN 17 17 13   CREATININE 1.3 1.1 1.1    102 101   CO2 24 26 25   GLUCOSE 118* 176* 101*   CALCIUM 9.2 9.4 9.2     Recent Labs     10/06/22  1311 10/07/22  0630 10/08/22  0713   WBC 6.5 6.6 8.4   HGB 8.1* 8.1* 7.9*   HCT 25.6* 25.7* 25.5*    300 301   MCV 69.7* 69.7* 70.6*     No results for input(s): CHOLTOT, TRIG, HDL, CHOLHDL, LDL in the last 72 hours. Invalid input(s): [address] [facility],[address], [address]  Recent Labs     10/05/22  1428   INR 0.94     Recent Labs     10/05/22  1428   TROPONINI <0.01     No results for input(s): BNP in the last 72 hours. No results for input(s): NTPROBNP in the last 72 hours. No results for input(s): TSH in the last 72 hours. Imaging:   I personally reviewed imaging studies including CXR, Stress test, TTE/SENA. Assessment & Plan:     Shortness of breath/GRANT as/CAD single-vessel CABG/A. fib a clip/bioprosthetic AVR  -Symptoms likely related to anemia. Patient will call biopsy results.  -Echocardiogram unchanged.  -Continue aspirin, no need for anticoagulation, last atrial clip  -Okay to discharge home  -Continue aspirin, furosemide, metoprolol, lisinopril. Thank you for allowing to us to participate in the care of Queen Caity. Please call our service with questions. All questions and concerns were addressed to the patient/family. Alternatives to my treatment were discussed. The note was completed using EMR. Every effort was made to ensure accuracy; however, inadvertent computerized transcription errors may be present. I have personally reviewed the reports and images of labs, radiological studies, cardiac studies including ECG's and telemetry, current and old medical records. Denny Miguel MD, [facility] [place], [address] [place]    10/8/2022 8:42 AM

## 2022-10-12 LAB — METHYLMALONIC ACID: 0.19 UMOL/L (ref 0–0.4)

## 2023-02-07 ASSESSMENT — ENCOUNTER SYMPTOMS
CHEST TIGHTNESS: 0
VOMITING: 0
WHEEZING: 0
COLOR CHANGE: 0
COUGH: 0
ABDOMINAL PAIN: 0
EYE DISCHARGE: 0
BACK PAIN: 0
BLOOD IN STOOL: 0
ABDOMINAL DISTENTION: 0
FACIAL SWELLING: 0
SHORTNESS OF BREATH: 0
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
## 2023-02-08 ENCOUNTER — OFFICE VISIT (OUTPATIENT)
Dept: CARDIOLOGY CLINIC | Age: 63
End: 2023-02-08
Payer: MEDICARE

## 2023-02-08 VITALS
OXYGEN SATURATION: 93 % | HEART RATE: 90 BPM | WEIGHT: 303.4 LBS | BODY MASS INDEX: 38.95 KG/M2 | DIASTOLIC BLOOD PRESSURE: 70 MMHG | SYSTOLIC BLOOD PRESSURE: 120 MMHG

## 2023-02-08 DIAGNOSIS — I25.10 CORONARY ARTERY DISEASE INVOLVING NATIVE CORONARY ARTERY OF NATIVE HEART WITHOUT ANGINA PECTORIS: ICD-10-CM

## 2023-02-08 DIAGNOSIS — I35.0 NONRHEUMATIC AORTIC VALVE STENOSIS: ICD-10-CM

## 2023-02-08 DIAGNOSIS — E78.2 MIXED HYPERLIPIDEMIA: Primary | ICD-10-CM

## 2023-02-08 DIAGNOSIS — I48.0 PAROXYSMAL ATRIAL FIBRILLATION (HCC): ICD-10-CM

## 2023-02-08 DIAGNOSIS — I10 ESSENTIAL HYPERTENSION: ICD-10-CM

## 2023-02-08 PROCEDURE — G8417 CALC BMI ABV UP PARAM F/U: HCPCS | Performed by: INTERNAL MEDICINE

## 2023-02-08 PROCEDURE — 99214 OFFICE O/P EST MOD 30 MIN: CPT | Performed by: INTERNAL MEDICINE

## 2023-02-08 PROCEDURE — 3017F COLORECTAL CA SCREEN DOC REV: CPT | Performed by: INTERNAL MEDICINE

## 2023-02-08 PROCEDURE — G8484 FLU IMMUNIZE NO ADMIN: HCPCS | Performed by: INTERNAL MEDICINE

## 2023-02-08 PROCEDURE — 3074F SYST BP LT 130 MM HG: CPT | Performed by: INTERNAL MEDICINE

## 2023-02-08 PROCEDURE — 3078F DIAST BP <80 MM HG: CPT | Performed by: INTERNAL MEDICINE

## 2023-02-08 PROCEDURE — G8427 DOCREV CUR MEDS BY ELIG CLIN: HCPCS | Performed by: INTERNAL MEDICINE

## 2023-02-08 PROCEDURE — 1036F TOBACCO NON-USER: CPT | Performed by: INTERNAL MEDICINE

## 2023-02-08 NOTE — ASSESSMENT & PLAN NOTE
Recheck lipids  Continue Lipitor 40  Goal LDL of 55  May need to add [facility] [place]    Lab Results   Component Value Date    LDLCALC 69 07/21/2022

## 2023-02-08 NOTE — RT PROTOCOL NOTE
Called relayed message pt needs to make follow up appt         Bertha Camacho    [facility]      RT Nebulizer Bronchodilator Protocol Note    There is a bronchodilator order in the chart from a provider indicating to follow the RT Bronchodilator Protocol and there is an Initiate RT Bronchodilator Protocol order as well (see protocol at bottom of note). CXR Findings:  XR CHEST PORTABLE    Result Date: 5/31/2022  Coarse consolidation throughout the left lung. Linear atelectasis or scarring the right lung base. No pneumothorax. Cardiomegaly status post sternotomy. Lines and tubes in standard position. Penn-Amarilys catheter tip is in the pulmonary outflow tract. The findings from the last RT Protocol Assessment were as follows:  Smoking: Smoker 15 pack years or more  Respiratory Pattern: Regular pattern and RR 12-20 bpm  Breath Sounds: Clear breath sounds  Cough: Strong, spontaneous, non-productive  Indication for Bronchodilator Therapy:    Bronchodilator Assessment Score: 1    Aerosolized bronchodilator medication orders have been revised according to the RT Nebulizer Bronchodilator Protocol below. Respiratory Therapist to perform RT Therapy Protocol Assessment initially then follow the protocol. Repeat RT Therapy Protocol Assessment PRN for score 0-3 or on second treatment, BID, and PRN for scores above 3. No Indications - adjust the frequency to every 6 hours PRN wheezing or bronchospasm, if no treatments needed after 48 hours then discontinue using Per Protocol order mode. If indication present, adjust the RT bronchodilator orders based on the Bronchodilator Assessment Score as indicated below. If a patient is on this medication at home then do not decrease Frequency below that used at home. 0-3 - enter or revise RT bronchodilator order(s) to equivalent RT Bronchodilator order with Frequency of every 4 hours PRN for wheezing or increased work of breathing using Per Protocol order mode.        4-6 - enter or revise RT Bronchodilator order(s) to two equivalent RT bronchodilator orders with one order with BID Frequency and one order with Frequency of every 4 hours PRN wheezing or increased work of breathing using Per Protocol order mode. 7-10 - enter or revise RT Bronchodilator order(s) to two equivalent RT bronchodilator orders with one order with TID Frequency and one order with Frequency of every 4 hours PRN wheezing or increased work of breathing using Per Protocol order mode. 11-13 - enter or revise RT Bronchodilator order(s) to one equivalent RT bronchodilator order with QID Frequency and an Albuterol order with Frequency of every 4 hours PRN wheezing or increased work of breathing using Per Protocol order mode. Greater than 13 - enter or revise RT Bronchodilator order(s) to one equivalent RT bronchodilator order with every 4 hours Frequency and an Albuterol order with Frequency of every 2 hours PRN wheezing or increased work of breathing using Per Protocol order mode. RT to enter RT Home Evaluation for COPD & MDI Assessment order using Per Protocol order mode.     Electronically signed by Laquita Alvarado RCP on 5/31/2022 at 6:15 PM

## 2023-03-02 NOTE — PROGRESS NOTES
Pt back on 6S. Otezla Counseling: The side effects of Otezla were discussed with the patient, including but not limited to worsening or new depression, weight loss, diarrhea, nausea, upper respiratory tract infection, and headache. Patient instructed to call the office should any adverse effect occur.  The patient verbalized understanding of the proper use and possible adverse effects of Otezla.  All the patient's questions and concerns were addressed.

## 2023-11-07 ASSESSMENT — ENCOUNTER SYMPTOMS
FACIAL SWELLING: 0
SHORTNESS OF BREATH: 0
COUGH: 0
COLOR CHANGE: 0
ABDOMINAL DISTENTION: 0
EYE DISCHARGE: 0
BLOOD IN STOOL: 0
ABDOMINAL PAIN: 0
BACK PAIN: 0
CHEST TIGHTNESS: 0
WHEEZING: 0
VOMITING: 0
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
## 2023-11-09 ENCOUNTER — OFFICE VISIT (OUTPATIENT)
Dept: CARDIOLOGY CLINIC | Age: 63
End: 2023-11-09
Payer: MEDICARE

## 2023-11-09 VITALS
HEART RATE: 105 BPM | WEIGHT: 300 LBS | BODY MASS INDEX: 38.52 KG/M2 | DIASTOLIC BLOOD PRESSURE: 76 MMHG | SYSTOLIC BLOOD PRESSURE: 110 MMHG

## 2023-11-09 DIAGNOSIS — Z01.810 PREOP CARDIOVASCULAR EXAM: Primary | ICD-10-CM

## 2023-11-09 DIAGNOSIS — I35.0 NONRHEUMATIC AORTIC VALVE STENOSIS: ICD-10-CM

## 2023-11-09 DIAGNOSIS — I48.0 PAROXYSMAL ATRIAL FIBRILLATION (HCC): ICD-10-CM

## 2023-11-09 DIAGNOSIS — I10 ESSENTIAL HYPERTENSION: ICD-10-CM

## 2023-11-09 DIAGNOSIS — I25.10 CORONARY ARTERY DISEASE INVOLVING NATIVE CORONARY ARTERY OF NATIVE HEART WITHOUT ANGINA PECTORIS: ICD-10-CM

## 2023-11-09 DIAGNOSIS — E78.2 MIXED HYPERLIPIDEMIA: ICD-10-CM

## 2023-11-09 PROCEDURE — 3017F COLORECTAL CA SCREEN DOC REV: CPT | Performed by: INTERNAL MEDICINE

## 2023-11-09 PROCEDURE — 99214 OFFICE O/P EST MOD 30 MIN: CPT | Performed by: INTERNAL MEDICINE

## 2023-11-09 PROCEDURE — 1036F TOBACCO NON-USER: CPT | Performed by: INTERNAL MEDICINE

## 2023-11-09 PROCEDURE — G8484 FLU IMMUNIZE NO ADMIN: HCPCS | Performed by: INTERNAL MEDICINE

## 2023-11-09 PROCEDURE — 3074F SYST BP LT 130 MM HG: CPT | Performed by: INTERNAL MEDICINE

## 2023-11-09 PROCEDURE — 3078F DIAST BP <80 MM HG: CPT | Performed by: INTERNAL MEDICINE

## 2023-11-09 PROCEDURE — 93000 ELECTROCARDIOGRAM COMPLETE: CPT | Performed by: INTERNAL MEDICINE

## 2023-11-09 PROCEDURE — G8417 CALC BMI ABV UP PARAM F/U: HCPCS | Performed by: INTERNAL MEDICINE

## 2023-11-09 PROCEDURE — G8427 DOCREV CUR MEDS BY ELIG CLIN: HCPCS | Performed by: INTERNAL MEDICINE

## 2023-11-09 NOTE — ASSESSMENT & PLAN NOTE
Ideally would like to recheck although heart rate elevated, heart rate is better  Continue aspirin for  Last echo valve working properly.

## 2023-11-09 NOTE — ASSESSMENT & PLAN NOTE
She is in A-fib/flutter today. Has atrial clip, not on anticoagulation.   Instructed to take extra dose of metop today and martin, then back to bid

## 2023-11-16 ENCOUNTER — OFFICE VISIT (OUTPATIENT)
Dept: CARDIOLOGY CLINIC | Age: 63
End: 2023-11-16
Payer: MEDICARE

## 2023-11-16 VITALS
BODY MASS INDEX: 38.65 KG/M2 | SYSTOLIC BLOOD PRESSURE: 118 MMHG | HEART RATE: 56 BPM | DIASTOLIC BLOOD PRESSURE: 76 MMHG | WEIGHT: 301 LBS | OXYGEN SATURATION: 96 %

## 2023-11-16 DIAGNOSIS — E78.2 MIXED HYPERLIPIDEMIA: ICD-10-CM

## 2023-11-16 DIAGNOSIS — I50.22 CHRONIC HFREF (HEART FAILURE WITH REDUCED EJECTION FRACTION) (HCC): ICD-10-CM

## 2023-11-16 DIAGNOSIS — I35.0 NONRHEUMATIC AORTIC VALVE STENOSIS: Primary | ICD-10-CM

## 2023-11-16 DIAGNOSIS — Z95.1 HX OF CABG: ICD-10-CM

## 2023-11-16 DIAGNOSIS — I25.10 CAD IN NATIVE ARTERY: ICD-10-CM

## 2023-11-16 DIAGNOSIS — I48.91 ATRIAL FIBRILLATION WITH RVR (HCC): ICD-10-CM

## 2023-11-16 DIAGNOSIS — R00.0 TACHYCARDIA: ICD-10-CM

## 2023-11-16 DIAGNOSIS — Z95.2 S/P AVR: ICD-10-CM

## 2023-11-16 DIAGNOSIS — I10 PRIMARY HYPERTENSION: ICD-10-CM

## 2023-11-16 PROCEDURE — G8484 FLU IMMUNIZE NO ADMIN: HCPCS | Performed by: NURSE PRACTITIONER

## 2023-11-16 PROCEDURE — 3074F SYST BP LT 130 MM HG: CPT | Performed by: NURSE PRACTITIONER

## 2023-11-16 PROCEDURE — G8427 DOCREV CUR MEDS BY ELIG CLIN: HCPCS | Performed by: NURSE PRACTITIONER

## 2023-11-16 PROCEDURE — 3078F DIAST BP <80 MM HG: CPT | Performed by: NURSE PRACTITIONER

## 2023-11-16 PROCEDURE — G8417 CALC BMI ABV UP PARAM F/U: HCPCS | Performed by: NURSE PRACTITIONER

## 2023-11-16 PROCEDURE — 3017F COLORECTAL CA SCREEN DOC REV: CPT | Performed by: NURSE PRACTITIONER

## 2023-11-16 PROCEDURE — 99215 OFFICE O/P EST HI 40 MIN: CPT | Performed by: NURSE PRACTITIONER

## 2023-11-16 PROCEDURE — 1036F TOBACCO NON-USER: CPT | Performed by: NURSE PRACTITIONER
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
## 2024-01-03 ENCOUNTER — HOSPITAL ENCOUNTER (OUTPATIENT)
Dept: NON INVASIVE DIAGNOSTICS | Age: 64
Discharge: HOME | End: 2024-01-03
Payer: MEDICARE

## 2024-01-03 DIAGNOSIS — I35.0 NONRHEUMATIC AORTIC VALVE STENOSIS: ICD-10-CM

## 2024-01-03 PROCEDURE — C8929 TTE W OR WO FOL WCON,DOPPLER: HCPCS

## 2024-01-04 ENCOUNTER — TELEPHONE (OUTPATIENT)
Dept: CARDIOLOGY CLINIC | Age: 64
End: 2024-01-04

## 2024-02-05 ASSESSMENT — ENCOUNTER SYMPTOMS
SHORTNESS OF BREATH: 0
EYE DISCHARGE: 0
ABDOMINAL DISTENTION: 0
VOMITING: 0
COLOR CHANGE: 0
COUGH: 0
FACIAL SWELLING: 0
ABDOMINAL PAIN: 0
BLOOD IN STOOL: 0
CHEST TIGHTNESS: 0
BACK PAIN: 0
WHEEZING: 0
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
## 2024-02-07 ENCOUNTER — OFFICE VISIT (OUTPATIENT)
Dept: CARDIOLOGY CLINIC | Age: 64
End: 2024-02-07
Payer: MEDICARE

## 2024-02-07 VITALS
WEIGHT: 307.4 LBS | BODY MASS INDEX: 39.47 KG/M2 | DIASTOLIC BLOOD PRESSURE: 70 MMHG | HEART RATE: 96 BPM | SYSTOLIC BLOOD PRESSURE: 110 MMHG

## 2024-02-07 DIAGNOSIS — I10 ESSENTIAL HYPERTENSION: ICD-10-CM

## 2024-02-07 DIAGNOSIS — I35.0 AORTIC STENOSIS WITH TRILEAFLET VALVE: Primary | ICD-10-CM

## 2024-02-07 DIAGNOSIS — I48.0 PAROXYSMAL ATRIAL FIBRILLATION (HCC): ICD-10-CM

## 2024-02-07 DIAGNOSIS — I25.10 CORONARY ARTERY DISEASE INVOLVING NATIVE CORONARY ARTERY OF NATIVE HEART WITHOUT ANGINA PECTORIS: ICD-10-CM

## 2024-02-07 DIAGNOSIS — I35.0 NONRHEUMATIC AORTIC VALVE STENOSIS: ICD-10-CM

## 2024-02-07 PROCEDURE — 99214 OFFICE O/P EST MOD 30 MIN: CPT | Performed by: INTERNAL MEDICINE

## 2024-02-07 PROCEDURE — G8427 DOCREV CUR MEDS BY ELIG CLIN: HCPCS | Performed by: INTERNAL MEDICINE

## 2024-02-07 PROCEDURE — 3017F COLORECTAL CA SCREEN DOC REV: CPT | Performed by: INTERNAL MEDICINE

## 2024-02-07 PROCEDURE — 93000 ELECTROCARDIOGRAM COMPLETE: CPT | Performed by: INTERNAL MEDICINE

## 2024-02-07 PROCEDURE — 1124F ACP DISCUSS-NO DSCNMKR DOCD: CPT | Performed by: INTERNAL MEDICINE

## 2024-02-07 PROCEDURE — 3074F SYST BP LT 130 MM HG: CPT | Performed by: INTERNAL MEDICINE

## 2024-02-07 PROCEDURE — 3078F DIAST BP <80 MM HG: CPT | Performed by: INTERNAL MEDICINE

## 2024-02-07 PROCEDURE — 1036F TOBACCO NON-USER: CPT | Performed by: INTERNAL MEDICINE

## 2024-02-07 PROCEDURE — G8484 FLU IMMUNIZE NO ADMIN: HCPCS | Performed by: INTERNAL MEDICINE

## 2024-02-07 PROCEDURE — G8417 CALC BMI ABV UP PARAM F/U: HCPCS | Performed by: INTERNAL MEDICINE

## 2024-02-07 RX ORDER — ASPIRIN 81 MG/1
81 TABLET ORAL DAILY
Qty: 90 TABLET | Refills: 3 | Status: SHIPPED | OUTPATIENT
Start: 2024-02-07

## 2024-02-07 NOTE — ASSESSMENT & PLAN NOTE
Rate is better although will refer to EP.  Continue metoprolol.  Not on anticoagulation, has atrial clip

## 2024-03-05 NOTE — PROGRESS NOTES
[facility]   Cardiac Electrophysiology Consultation   Date: 3/14/2024  Reason for Consultation:   Consult Requesting Physician: No att. providers found     Chief Complaint:   Chief Complaint   Patient presents with    New Patient     Afib         HPI: Perfecto Enciso is a 64 y.o. male referred by Dr. Sorto for AF. PMH signficant for HTN, HLD, CAD, AS, s/p AVR with CABG and TRAVIS clip (5/2022), AF (dx'ed 4/2022) after p/w SOB, s/p SENA/DCCV (4/8/22), s/p LHC showed mild to moderate CAD, EF 40-45% (per echo, 4/2022), EF improved to 55 (10/2022), recurrence of AF/AFL RVR (11/2023), metoprolol increased and f/u OV with NP reported HR in the 50's, but no ECG was done. ECG (2/7/24) showed AF/AFL CVR. Echo (1/2024) showed EF 50% with LAE and grade II DD.    Interval History:   Today, he is here for management of AF, presenting in AFL CVR. He does not recall feeling any symptoms prior to the DCCV in 2022 and does not recall feeling any differently following the DCCV. He does complain of a decline in his functional capacity and GRANT, but he feels that he is deconditioned. He is recovering from knee surgery in 12/2023. Prior to his knee replacement, he was a  using a ladder. Now, he is not particularly active outside of his daily activities. He sleeps well and wife denies that he snores significantly.     He reports that his esophagus was \"punctured\" following his CABG. Review of EGD (10/2022) reports esophagus with no abnormalities and AVM in the duodenum; he had APC.      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 Sorto  AS, s/p AVR  HTN, stable on lisinopril, Lopressor  HLD, on statin  LAE    Plan:  Due to symptomatic AF/AFL with CMP and LAE, recommend rhythm management with combination of AAD and AF ablation.  Plan to start Amio for 3

## 2024-03-14 ENCOUNTER — OFFICE VISIT (OUTPATIENT)
Dept: CARDIOLOGY CLINIC | Age: 64
End: 2024-03-14
Payer: MEDICARE

## 2024-03-14 ENCOUNTER — TELEPHONE (OUTPATIENT)
Dept: CARDIOLOGY CLINIC | Age: 64
End: 2024-03-14

## 2024-03-14 VITALS
WEIGHT: 305 LBS | DIASTOLIC BLOOD PRESSURE: 82 MMHG | BODY MASS INDEX: 39.16 KG/M2 | HEART RATE: 83 BPM | SYSTOLIC BLOOD PRESSURE: 120 MMHG

## 2024-03-14 DIAGNOSIS — I48.92 ATRIAL FLUTTER, UNSPECIFIED TYPE (HCC): ICD-10-CM

## 2024-03-14 DIAGNOSIS — I50.20 HFREF (HEART FAILURE WITH REDUCED EJECTION FRACTION) (HCC): ICD-10-CM

## 2024-03-14 DIAGNOSIS — I35.0 NONRHEUMATIC AORTIC VALVE STENOSIS: ICD-10-CM

## 2024-03-14 DIAGNOSIS — E78.2 MIXED HYPERLIPIDEMIA: ICD-10-CM

## 2024-03-14 DIAGNOSIS — I25.10 CORONARY ARTERY DISEASE INVOLVING NATIVE CORONARY ARTERY OF NATIVE HEART WITHOUT ANGINA PECTORIS: ICD-10-CM

## 2024-03-14 DIAGNOSIS — I48.19 PERSISTENT ATRIAL FIBRILLATION (HCC): Primary | ICD-10-CM

## 2024-03-14 DIAGNOSIS — I10 ESSENTIAL HYPERTENSION: ICD-10-CM

## 2024-03-14 PROCEDURE — 1124F ACP DISCUSS-NO DSCNMKR DOCD: CPT | Performed by: INTERNAL MEDICINE

## 2024-03-14 PROCEDURE — G8427 DOCREV CUR MEDS BY ELIG CLIN: HCPCS | Performed by: INTERNAL MEDICINE

## 2024-03-14 PROCEDURE — 93000 ELECTROCARDIOGRAM COMPLETE: CPT | Performed by: INTERNAL MEDICINE

## 2024-03-14 PROCEDURE — G8417 CALC BMI ABV UP PARAM F/U: HCPCS | Performed by: INTERNAL MEDICINE

## 2024-03-14 PROCEDURE — 3079F DIAST BP 80-89 MM HG: CPT | Performed by: INTERNAL MEDICINE

## 2024-03-14 PROCEDURE — 1036F TOBACCO NON-USER: CPT | Performed by: INTERNAL MEDICINE

## 2024-03-14 PROCEDURE — G8484 FLU IMMUNIZE NO ADMIN: HCPCS | Performed by: INTERNAL MEDICINE

## 2024-03-14 PROCEDURE — 3017F COLORECTAL CA SCREEN DOC REV: CPT | Performed by: INTERNAL MEDICINE

## 2024-03-14 PROCEDURE — 99205 OFFICE O/P NEW HI 60 MIN: CPT | Performed by: INTERNAL MEDICINE

## 2024-03-14 PROCEDURE — 3074F SYST BP LT 130 MM HG: CPT | Performed by: INTERNAL MEDICINE

## 2024-03-14 NOTE — PATIENT INSTRUCTIONS
Electrophysiology Study and Atrial Fibrillation (AFib) Ablation    Date of Procedure:     Time of Arrival:     Cardiologist performing procedure: Dr. Crocker    You will get a call from our  with the date and time.    Do not eat or drink anything after midnight the night before the procedure.      You may brush your teeth and rinse the morning of the procedure.    Do NOT stop taking aspirin or Eliquis (any blood thinners) leading up to the procedure.  However, do not take any blood thinners the morning of the procedure.    Take all your other routine medications the morning of the procedure with the following exceptions:  If you take a water pill, please HOLD on the day of the procedure.  If you take a blood thinner, please HOLD on the day of the procedure.  Hold any other medications as instructed above.    Do not apply any lotion, powder, or deodorant the morning of the procedure.    Please bring a list of your medications to the hospital with you.    CT scan of the chest is due within a week of the procedure. Someone will call you with the date/time of the CT scan.       Lab work is due within a week of the procedure (can be done the same day as the CT scan). You do not need to be fasting for these labs. This can be done at the [facility] lab at [address]    You must have someone available to drive you home the same day.  It is recommended that you do not drive for 48 hours after the procedure.  You will also need someone with you at home the night of the procedure.    If you are unable to make this appointment, please call [facility], at [phone].

## 2024-03-14 NOTE — TELEPHONE ENCOUNTER
Please schedule pt for AF/AFL ablation with [facility] at [place] in at least 4 weeks. EP form started. Instructions reviewed with pt during OV today. Please schedule CTA at [facility]. Please call Fidelia (daughter) to schedule [phone].

## 2024-03-18 ENCOUNTER — TELEPHONE (OUTPATIENT)
Dept: CARDIOLOGY CLINIC | Age: 64
End: 2024-03-18

## 2024-03-18 NOTE — TELEPHONE ENCOUNTER
Pt's daughter Fidelia is the person that will be the contact for Ablation and appts    [phone]    They were confused as to whether or not they should be rescheduling the CTA that was cancelled for 03.19.2024. Pt and daughter were advised to wait to hear from  for ablation to complete any and all scheduling.

## 2024-03-18 NOTE — TELEPHONE ENCOUNTER
The patient's spouse Kate called requesting to speak with Neisha CORDOBA. Kate states she has a few questions about the CTA that was ordered for the patient, she did not go into details of what the questions are. [phone]

## 2024-03-18 NOTE — TELEPHONE ENCOUNTER
Spoke with wife, Kate. Answered questions about CTA. It can be done at [facility] within 1 week of the ablation (labs same day). They are anxious to get the AF/AFL ablation scheduled.

## 2024-03-21 NOTE — TELEPHONE ENCOUNTER
Spoke with the pt wife and him and he would like to do procedure over at [place]. I told them looks like she may have some late April dates avail. I will have her call to schedule ablation and CTA.I am sending over to Evelia to schedule in [place].

## 2024-03-22 NOTE — TELEPHONE ENCOUNTER
Procedure:  AF/AFL Abl  Doctor:  Dr. Crocker  Date:  4/30/24  Time:  8am  Arrival:  6:30am  Reps:  Tamiko  Anesthesia:  Yes    CTA   4/23/24 @ 9AM      Spoke with patient's daughter Fidelia. Please have patient arrive to the main entrance of [facility] ([address] [place] [id number]) and check in with the registration desk.  They will be directed to the Cath Lab.

## 2024-03-25 ENCOUNTER — PREP FOR PROCEDURE (OUTPATIENT)
Dept: CARDIOLOGY CLINIC | Age: 64
End: 2024-03-25

## 2024-03-31 RX ORDER — SODIUM CHLORIDE 0.9 % (FLUSH) 0.9 %
5-40 SYRINGE (ML) INJECTION PRN
Status: CANCELLED | OUTPATIENT
Start: 2024-04-23

## 2024-03-31 RX ORDER — SODIUM CHLORIDE 9 MG/ML
INJECTION, SOLUTION INTRAVENOUS PRN
Status: CANCELLED | OUTPATIENT
Start: 2024-04-23

## 2024-03-31 RX ORDER — SODIUM CHLORIDE 0.9 % (FLUSH) 0.9 %
5-40 SYRINGE (ML) INJECTION EVERY 12 HOURS SCHEDULED
Status: CANCELLED | OUTPATIENT
Start: 2024-04-23

## 2024-04-10 ENCOUNTER — APPOINTMENT (OUTPATIENT)
Dept: GENERAL RADIOLOGY | Age: 64
DRG: 312 | End: 2024-04-10
Payer: MEDICARE

## 2024-04-10 ENCOUNTER — HOSPITAL ENCOUNTER (INPATIENT)
Age: 64
LOS: 1 days | Discharge: HOME | DRG: 312 | End: 2024-04-11
Attending: STUDENT IN AN ORGANIZED HEALTH CARE EDUCATION/TRAINING PROGRAM | Admitting: INTERNAL MEDICINE
Payer: MEDICARE

## 2024-04-10 ENCOUNTER — APPOINTMENT (OUTPATIENT)
Dept: CT IMAGING | Age: 64
DRG: 312 | End: 2024-04-10
Payer: MEDICARE

## 2024-04-10 DIAGNOSIS — R55 NEAR SYNCOPE: Primary | ICD-10-CM

## 2024-04-10 DIAGNOSIS — R51.9 NONINTRACTABLE HEADACHE, UNSPECIFIED CHRONICITY PATTERN, UNSPECIFIED HEADACHE TYPE: ICD-10-CM

## 2024-04-10 DIAGNOSIS — I48.91 ATRIAL FIBRILLATION, UNSPECIFIED TYPE (HCC): ICD-10-CM

## 2024-04-10 LAB
ANION GAP SERPL CALCULATED.3IONS-SCNC: 11 MMOL/L (ref 3–16)
ANTI-XA UNFRAC HEPARIN: 0.97 IU/ML (ref 0.3–0.7)
BASOPHILS # BLD: 0.1 K/UL (ref 0–0.2)
BASOPHILS NFR BLD: 0.6 %
BUN SERPL-MCNC: 17 MG/DL (ref 7–20)
CALCIUM SERPL-MCNC: 8.9 MG/DL (ref 8.3–10.6)
CHLORIDE SERPL-SCNC: 99 MMOL/L (ref 99–110)
CO2 SERPL-SCNC: 25 MMOL/L (ref 21–32)
CREAT SERPL-MCNC: 0.9 MG/DL (ref 0.8–1.3)
DEPRECATED RDW RBC AUTO: 14.7 % (ref 12.4–15.4)
EOSINOPHIL # BLD: 0.3 K/UL (ref 0–0.6)
EOSINOPHIL NFR BLD: 3.4 %
GFR SERPLBLD CREATININE-BSD FMLA CKD-EPI: >90 ML/MIN/{1.73_M2}
GLUCOSE BLD-MCNC: 154 MG/DL (ref 70–99)
GLUCOSE SERPL-MCNC: 132 MG/DL (ref 70–99)
HCT VFR BLD AUTO: 40.5 % (ref 40.5–52.5)
HGB BLD-MCNC: 13.6 G/DL (ref 13.5–17.5)
LYMPHOCYTES # BLD: 2.2 K/UL (ref 1–5.1)
LYMPHOCYTES NFR BLD: 23.8 %
MCH RBC QN AUTO: 29.7 PG (ref 26–34)
MCHC RBC AUTO-ENTMCNC: 33.6 G/DL (ref 31–36)
MCV RBC AUTO: 88.6 FL (ref 80–100)
MONOCYTES # BLD: 1 K/UL (ref 0–1.3)
MONOCYTES NFR BLD: 10.8 %
NEUTROPHILS # BLD: 5.6 K/UL (ref 1.7–7.7)
NEUTROPHILS NFR BLD: 61.4 %
NT-PROBNP SERPL-MCNC: 192 PG/ML (ref 0–124)
PERFORMED ON: ABNORMAL
PLATELET # BLD AUTO: 244 K/UL (ref 135–450)
PMV BLD AUTO: 9 FL (ref 5–10.5)
POTASSIUM SERPL-SCNC: 3.7 MMOL/L (ref 3.5–5.1)
RBC # BLD AUTO: 4.58 M/UL (ref 4.2–5.9)
SODIUM SERPL-SCNC: 135 MMOL/L (ref 136–145)
TROPONIN, HIGH SENSITIVITY: 12 NG/L (ref 0–22)
TROPONIN, HIGH SENSITIVITY: 16 NG/L (ref 0–22)
WBC # BLD AUTO: 9.2 K/UL (ref 4–11)

## 2024-04-10 PROCEDURE — 80048 BASIC METABOLIC PNL TOTAL CA: CPT

## 2024-04-10 PROCEDURE — 70450 CT HEAD/BRAIN W/O DYE: CPT

## 2024-04-10 PROCEDURE — 6370000000 HC RX 637 (ALT 250 FOR IP)

## 2024-04-10 PROCEDURE — 1200000000 HC SEMI PRIVATE

## 2024-04-10 PROCEDURE — 93005 ELECTROCARDIOGRAM TRACING: CPT | Performed by: INTERNAL MEDICINE

## 2024-04-10 PROCEDURE — 71045 X-RAY EXAM CHEST 1 VIEW: CPT

## 2024-04-10 PROCEDURE — 85520 HEPARIN ASSAY: CPT

## 2024-04-10 PROCEDURE — 83880 ASSAY OF NATRIURETIC PEPTIDE: CPT

## 2024-04-10 PROCEDURE — 84484 ASSAY OF TROPONIN QUANT: CPT

## 2024-04-10 PROCEDURE — 99285 EMERGENCY DEPT VISIT HI MDM: CPT

## 2024-04-10 PROCEDURE — 85025 COMPLETE CBC W/AUTO DIFF WBC: CPT

## 2024-04-10 PROCEDURE — 36415 COLL VENOUS BLD VENIPUNCTURE: CPT

## 2024-04-10 RX ORDER — ACETAMINOPHEN 325 MG/1
650 TABLET ORAL EVERY 6 HOURS PRN
Status: DISCONTINUED | OUTPATIENT
Start: 2024-04-10 | End: 2024-04-11 | Stop reason: HOSPADM

## 2024-04-10 RX ORDER — SODIUM CHLORIDE 0.9 % (FLUSH) 0.9 %
5-40 SYRINGE (ML) INJECTION EVERY 12 HOURS SCHEDULED
Status: DISCONTINUED | OUTPATIENT
Start: 2024-04-10 | End: 2024-04-11 | Stop reason: HOSPADM

## 2024-04-10 RX ORDER — ONDANSETRON 2 MG/ML
4 INJECTION INTRAMUSCULAR; INTRAVENOUS EVERY 6 HOURS PRN
Status: DISCONTINUED | OUTPATIENT
Start: 2024-04-10 | End: 2024-04-11 | Stop reason: HOSPADM

## 2024-04-10 RX ORDER — MAGNESIUM SULFATE IN WATER 40 MG/ML
2000 INJECTION, SOLUTION INTRAVENOUS PRN
Status: DISCONTINUED | OUTPATIENT
Start: 2024-04-10 | End: 2024-04-11 | Stop reason: HOSPADM

## 2024-04-10 RX ORDER — POTASSIUM CHLORIDE 20 MEQ/1
40 TABLET, EXTENDED RELEASE ORAL PRN
Status: DISCONTINUED | OUTPATIENT
Start: 2024-04-10 | End: 2024-04-11 | Stop reason: HOSPADM

## 2024-04-10 RX ORDER — ACETAMINOPHEN 650 MG/1
650 SUPPOSITORY RECTAL EVERY 6 HOURS PRN
Status: DISCONTINUED | OUTPATIENT
Start: 2024-04-10 | End: 2024-04-11 | Stop reason: HOSPADM

## 2024-04-10 RX ORDER — ACETAMINOPHEN 325 MG/1
1000 TABLET ORAL ONCE
Status: COMPLETED | OUTPATIENT
Start: 2024-04-10 | End: 2024-04-10

## 2024-04-10 RX ORDER — SODIUM CHLORIDE 9 MG/ML
INJECTION, SOLUTION INTRAVENOUS PRN
Status: DISCONTINUED | OUTPATIENT
Start: 2024-04-10 | End: 2024-04-11 | Stop reason: HOSPADM

## 2024-04-10 RX ORDER — SODIUM CHLORIDE 0.9 % (FLUSH) 0.9 %
5-40 SYRINGE (ML) INJECTION PRN
Status: DISCONTINUED | OUTPATIENT
Start: 2024-04-10 | End: 2024-04-11 | Stop reason: HOSPADM

## 2024-04-10 RX ORDER — POLYETHYLENE GLYCOL 3350 17 G/17G
17 POWDER, FOR SOLUTION ORAL DAILY PRN
Status: DISCONTINUED | OUTPATIENT
Start: 2024-04-10 | End: 2024-04-11 | Stop reason: HOSPADM

## 2024-04-10 RX ORDER — POTASSIUM CHLORIDE 7.45 MG/ML
10 INJECTION INTRAVENOUS PRN
Status: DISCONTINUED | OUTPATIENT
Start: 2024-04-10 | End: 2024-04-11 | Stop reason: HOSPADM

## 2024-04-10 RX ORDER — ONDANSETRON 4 MG/1
4 TABLET, ORALLY DISINTEGRATING ORAL EVERY 8 HOURS PRN
Status: DISCONTINUED | OUTPATIENT
Start: 2024-04-10 | End: 2024-04-11 | Stop reason: HOSPADM

## 2024-04-10 RX ADMIN — ACETAMINOPHEN 975 MG: 325 TABLET ORAL at 22:18

## 2024-04-10 ASSESSMENT — ENCOUNTER SYMPTOMS
EYES NEGATIVE: 1
ALLERGIC/IMMUNOLOGIC NEGATIVE: 1
ABDOMINAL PAIN: 0
RESPIRATORY NEGATIVE: 1
ABDOMINAL DISTENTION: 1

## 2024-04-10 ASSESSMENT — LIFESTYLE VARIABLES
HOW OFTEN DO YOU HAVE A DRINK CONTAINING ALCOHOL: MONTHLY OR LESS
HOW MANY STANDARD DRINKS CONTAINING ALCOHOL DO YOU HAVE ON A TYPICAL DAY: PATIENT DOES NOT DRINK

## 2024-04-10 ASSESSMENT — PAIN DESCRIPTION - ORIENTATION: ORIENTATION: LEFT

## 2024-04-10 ASSESSMENT — PAIN DESCRIPTION - LOCATION: LOCATION: HEAD

## 2024-04-10 ASSESSMENT — PAIN - FUNCTIONAL ASSESSMENT: PAIN_FUNCTIONAL_ASSESSMENT: 0-10

## 2024-04-10 ASSESSMENT — PAIN SCALES - GENERAL: PAINLEVEL_OUTOF10: 6

## 2024-04-10 NOTE — ED PROVIDER NOTES
ED Attending Attestation Note     Date of evaluation: 4/10/2024    This patient was seen by the resident.  I have seen and examined the patient, agree with the workup, evaluation, management and diagnosis. The care plan has been discussed.  I have reviewed the ECG and concur with the resident's interpretation.  My assessment reveals a 64-year-old male followed by cardiology for atrial fibrillation and has a history of hypertension, hyperlipidemia, CAD, aortic stenosis status post aortic valve replacement with CABG and TRAVIS clip May 2022 status post left heart cath showing mid to moderate CAD on Eliquis and metoprolol who presented to the hospital for evaluation of tachycardia.  Previous records show the patient saw electrophysiology on March 14 and plan was for combination of antiarrhythmic agent and atrial fibrillation ablation with plans to start amiodarone for 3 months postprocedure but this has not happened yet with the plan for the procedure to be later this month.  Prehospital EKG revealed a rhythm concerning for possible atrial fibrillation versus atrial flutter with a rate of 113 with no evidence of any ischemia. EKG appears to be atrial fibrillation here. Daughters arrived and stated the patient over the past couple days has been having episodes where he gets diaphoretic and pale and becomes lightheaded and does not seem to be responding.  He denies having any chest pain or shortness of breath and today had an episode where he had some slurring of his speech and was not responding to the family trying to talk to him.  There is evidence of an increasing frequency.  On assessment the patient states that he is asymptomatic and is HDS with no FND. 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     Hudson Schneider MD  04/10/24 6910

## 2024-04-10 NOTE — ED PROVIDER NOTES
THE [facility]  EMERGENCY DEPARTMENT ENCOUNTER          EM RESIDENT NOTE       Date of evaluation: 4/10/2024    Chief Complaint     Headache (Pt states he has had a left sided headache for 2 days, dizziness, and slurred speech since today at 1900. )      History of Present Illness     Perfecto Enciso is a 64 y.o. male who presents with acute onset slow start headache 2 days ago that progressed to feeling light headed today, feeling cool/cold, blurry vision and a sensation of near syncope. Patient did not pass out. He is on eliquis for afib with a planned ablation coming up. As per patient during the episode his felt like it was racing. He denies abdominal pain, fever, chills, nausea, vomiting. Patient brought in by EMS. No interventions en route. No bloody stools or dysuria. No orthopnea. Shortness of breath with exertion. Endorses medication compliance.     Collateral from family   For 2 days the patient has had intermittent episodes of whole body drenching with sweat. He experiences associated light headedness and appears very pale. Today they were prompted to call EMS when he started to experience slurring of his speech.     MEDICAL DECISION MAKING / ASSESSMENT / PLAN     INITIAL VITALS: BP: (!) 180/110, Temp: 98.3 °F (36.8 °C), Pulse: (!) 113, Respirations: 20, SpO2: 99 %    Perfecto Enciso is a 64 y.o. male presenting with 2 days of headache that progressed to near syncope today.  Differential includes but not limited to tachydysrhythmia, stroke, ICH, HF    Patient presented via EMS on NC and was susequently taken off. He is now on room air saturating above 96%.    To further evaluate this patient I will obtain CBC, renal, BNP, chest xray, EKG, anti-xa, PE    Tylenol for symptomatic management of headache.     Initial EKG obtained shows atrial flutter vs atrial fibrillation. Unclear is there are PVCs or incomplete heart block. Comparison to prior EKGs shows that the patient has had a similar rhythm

## 2024-04-11 VITALS
BODY MASS INDEX: 35.58 KG/M2 | OXYGEN SATURATION: 96 % | SYSTOLIC BLOOD PRESSURE: 153 MMHG | RESPIRATION RATE: 18 BRPM | TEMPERATURE: 98.1 F | DIASTOLIC BLOOD PRESSURE: 90 MMHG | HEIGHT: 74 IN | WEIGHT: 277.2 LBS | HEART RATE: 90 BPM

## 2024-04-11 PROBLEM — Z95.2 S/P AVR (AORTIC VALVE REPLACEMENT): Status: ACTIVE | Noted: 2024-04-11

## 2024-04-11 LAB
ANION GAP SERPL CALCULATED.3IONS-SCNC: 10 MMOL/L (ref 3–16)
BASOPHILS # BLD: 0.1 K/UL (ref 0–0.2)
BASOPHILS NFR BLD: 0.9 %
BUN SERPL-MCNC: 16 MG/DL (ref 7–20)
CALCIUM SERPL-MCNC: 8.9 MG/DL (ref 8.3–10.6)
CHLORIDE SERPL-SCNC: 105 MMOL/L (ref 99–110)
CO2 SERPL-SCNC: 24 MMOL/L (ref 21–32)
CREAT SERPL-MCNC: 0.8 MG/DL (ref 0.8–1.3)
DEPRECATED RDW RBC AUTO: 14.2 % (ref 12.4–15.4)
EKG ATRIAL RATE: 300 BPM
EKG DIAGNOSIS: NORMAL
EKG Q-T INTERVAL: 342 MS
EKG QRS DURATION: 102 MS
EKG QTC CALCULATION (BAZETT): 465 MS
EKG R AXIS: 52 DEGREES
EKG T AXIS: 15 DEGREES
EKG VENTRICULAR RATE: 111 BPM
EOSINOPHIL # BLD: 0.3 K/UL (ref 0–0.6)
EOSINOPHIL NFR BLD: 3.1 %
GFR SERPLBLD CREATININE-BSD FMLA CKD-EPI: >90 ML/MIN/{1.73_M2}
GLUCOSE SERPL-MCNC: 129 MG/DL (ref 70–99)
HCT VFR BLD AUTO: 40 % (ref 40.5–52.5)
HGB BLD-MCNC: 13.2 G/DL (ref 13.5–17.5)
LYMPHOCYTES # BLD: 2.1 K/UL (ref 1–5.1)
LYMPHOCYTES NFR BLD: 26.1 %
MCH RBC QN AUTO: 28.7 PG (ref 26–34)
MCHC RBC AUTO-ENTMCNC: 32.9 G/DL (ref 31–36)
MCV RBC AUTO: 87.3 FL (ref 80–100)
MONOCYTES # BLD: 0.9 K/UL (ref 0–1.3)
MONOCYTES NFR BLD: 11 %
NEUTROPHILS # BLD: 4.7 K/UL (ref 1.7–7.7)
NEUTROPHILS NFR BLD: 58.9 %
PLATELET # BLD AUTO: 211 K/UL (ref 135–450)
PMV BLD AUTO: 8.9 FL (ref 5–10.5)
POTASSIUM SERPL-SCNC: 3.8 MMOL/L (ref 3.5–5.1)
RBC # BLD AUTO: 4.58 M/UL (ref 4.2–5.9)
SODIUM SERPL-SCNC: 139 MMOL/L (ref 136–145)
TSH SERPL DL<=0.005 MIU/L-ACNC: 1.13 UIU/ML (ref 0.27–4.2)
WBC # BLD AUTO: 8 K/UL (ref 4–11)

## 2024-04-11 PROCEDURE — 99223 1ST HOSP IP/OBS HIGH 75: CPT | Performed by: INTERNAL MEDICINE

## 2024-04-11 PROCEDURE — 80048 BASIC METABOLIC PNL TOTAL CA: CPT

## 2024-04-11 PROCEDURE — 36415 COLL VENOUS BLD VENIPUNCTURE: CPT

## 2024-04-11 PROCEDURE — 2580000003 HC RX 258: Performed by: INTERNAL MEDICINE

## 2024-04-11 PROCEDURE — 84443 ASSAY THYROID STIM HORMONE: CPT

## 2024-04-11 PROCEDURE — 85025 COMPLETE CBC W/AUTO DIFF WBC: CPT

## 2024-04-11 PROCEDURE — 6370000000 HC RX 637 (ALT 250 FOR IP): Performed by: INTERNAL MEDICINE

## 2024-04-11 RX ORDER — PANTOPRAZOLE SODIUM 40 MG/1
40 TABLET, DELAYED RELEASE ORAL
Status: DISCONTINUED | OUTPATIENT
Start: 2024-04-11 | End: 2024-04-11 | Stop reason: HOSPADM

## 2024-04-11 RX ORDER — ASPIRIN 81 MG/1
81 TABLET ORAL DAILY
Status: DISCONTINUED | OUTPATIENT
Start: 2024-04-11 | End: 2024-04-11 | Stop reason: HOSPADM

## 2024-04-11 RX ORDER — M-VIT,TX,IRON,MINS/CALC/FOLIC 27MG-0.4MG
1 TABLET ORAL DAILY
Status: DISCONTINUED | OUTPATIENT
Start: 2024-04-11 | End: 2024-04-11 | Stop reason: HOSPADM

## 2024-04-11 RX ORDER — ATORVASTATIN CALCIUM 40 MG/1
40 TABLET, FILM COATED ORAL NIGHTLY
Status: DISCONTINUED | OUTPATIENT
Start: 2024-04-11 | End: 2024-04-11 | Stop reason: HOSPADM

## 2024-04-11 RX ORDER — METOPROLOL TARTRATE 50 MG/1
50 TABLET, FILM COATED ORAL 2 TIMES DAILY
Status: DISCONTINUED | OUTPATIENT
Start: 2024-04-11 | End: 2024-04-11 | Stop reason: HOSPADM

## 2024-04-11 RX ORDER — FUROSEMIDE 40 MG/1
40 TABLET ORAL DAILY PRN
Qty: 30 TABLET | Refills: 1
Start: 2024-04-11

## 2024-04-11 RX ORDER — FUROSEMIDE 40 MG/1
40 TABLET ORAL DAILY
Status: DISCONTINUED | OUTPATIENT
Start: 2024-04-11 | End: 2024-04-11 | Stop reason: HOSPADM

## 2024-04-11 RX ORDER — LISINOPRIL 2.5 MG/1
2.5 TABLET ORAL DAILY
Status: DISCONTINUED | OUTPATIENT
Start: 2024-04-11 | End: 2024-04-11

## 2024-04-11 RX ADMIN — FUROSEMIDE 40 MG: 40 TABLET ORAL at 11:17

## 2024-04-11 RX ADMIN — METOPROLOL TARTRATE 50 MG: 50 TABLET, FILM COATED ORAL at 11:17

## 2024-04-11 RX ADMIN — SODIUM CHLORIDE, PRESERVATIVE FREE 10 ML: 5 INJECTION INTRAVENOUS at 11:18

## 2024-04-11 RX ADMIN — APIXABAN 5 MG: 5 TABLET, FILM COATED ORAL at 01:37

## 2024-04-11 RX ADMIN — METOPROLOL TARTRATE 50 MG: 50 TABLET, FILM COATED ORAL at 01:36

## 2024-04-11 RX ADMIN — Medication 1 TABLET: at 11:17

## 2024-04-11 RX ADMIN — APIXABAN 5 MG: 5 TABLET, FILM COATED ORAL at 11:41

## 2024-04-11 RX ADMIN — PANTOPRAZOLE SODIUM 40 MG: 40 TABLET, DELAYED RELEASE ORAL at 11:17

## 2024-04-11 RX ADMIN — ASPIRIN 81 MG: 81 TABLET, COATED ORAL at 11:17

## 2024-04-11 RX ADMIN — PANTOPRAZOLE SODIUM 40 MG: 40 TABLET, DELAYED RELEASE ORAL at 06:38

## 2024-04-11 RX ADMIN — SODIUM CHLORIDE, PRESERVATIVE FREE 10 ML: 5 INJECTION INTRAVENOUS at 01:37

## 2024-04-11 NOTE — PROGRESS NOTES
4 Eyes Skin Assessment     NAME:  Perfecto Ecniso  YOB: 1960  MEDICAL RECORD NUMBER:  [id number]    The patient is being assessed for  Admission    I agree that at least one RN has performed a thorough Head to Toe Skin Assessment on the patient. ALL assessment sites listed below have been assessed.      Areas assessed by both nurses:    Head, Face, Ears, Shoulders, Back, Chest, Arms, Elbows, Hands, Sacrum. Buttock, Coccyx, Ischium, and Legs. Feet and Heels        Does the Patient have a Wound? No noted wound(s)       Phi Prevention initiated by RN: Yes  Wound Care Orders initiated by RN: No    Pressure Injury (Stage 3,4, Unstageable, DTI, NWPT, and Complex wounds) if present, place Wound referral order by RN under : No    New Ostomies, if present place, Ostomy referral order under : No     Nurse 1 eSignature: Electronically signed by Winsome Harris RN on 4/11/24 at 6:12 AM EDT    **SHARE this note so that the co-signing nurse can place an eSignature**    Nurse 2 eSignature: Electronically signed by Shelton Sheppard RN on 4/11/24 at 6:13 AM EDT
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
## 2024-04-11 NOTE — CARE COORDINATION
is available. (payment due at time of pick-up or delivery - cash, check, or card accepted)     Able to afford home medications/ co-pay costs: Yes    ADLS:  Current PT AM-PAC Score:   /24  Current OT AM-PAC Score:   /24    DISCHARGE Disposition: {CM Discharge Disposition:04016}    LOC at discharge: Not Applicable  ONEAL Completed: No    Notification completed in HENS/PAS?:  Not Applicable    IMM Completed:   No         Transportation:  Transportation PLAN for discharge: family   Mode of Transport: Private Car  Reason for medical transport: Not Applicable  Name of Transport Company: Not Applicable  Time of Transport: when family available    Transport form completed: No    Home Care:  Home Care ordered at discharge: {RESPONSES; Yes/No/Not Indicated:83116}  Home Care Agency: { Home Care Providers:80162}  Orders faxed: {Yes/No:95130}    Durable Medical Equipment:  DME Provider: RENE  Equipment obtained during hospitalization: NA    Home Oxygen and Respiratory Equipment:  Oxygen needed at discharge?: No  Home Oxygen Company: Not Applicable  Portable tank available for discharge?: Not Indicated    Dialysis:  Dialysis patient: No    Dialysis Center:  Not Applicable    Hospice Services:  Location: Not Applicable  Agency: Not Applicable    Consents signed: Not Indicated    Referrals made at DISCHARGE for outpatient continued care:  Not Applicable    Additional CM Notes:     CM  confirmed  d/c hoe today:  Patient to follow up out pt a instructed. Appt  as  noted  below.     New Rx: Paper Rx  provided.     furosemide    APR 23 CTA PULMONARY W CONTRAST 9:00 AM  Arrive by 8:30 AM  [facility] CT Scan  [address]  [facility] [address]  [phone]  PREPS: * NPO 4 hours prior to the procedure * Bring films if taken at another facility         COVID Result:    Lab Results   Component Value Date/Time    COVID19 NOT DETECTED 10/05/2022 02:51 PM       The Plan for Transition of Care is related to the following
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
## 2024-04-11 NOTE — FLOWSHEET NOTE
Admitted pt from ED, alert oriented x4, on room air, ambulatory but at risk for fall. Oriented to staff, room set-up, call system and bed control. Vital signs checked and recorded. Tele attached. Snacks provided. Made pt comfortable on bed.   04/11/24 0101   Vitals   Temp 97.6 °F (36.4 °C)   Temp Source Oral   Pulse 87   Heart Rate Source Monitor   Respirations 16   /75   MAP (Calculated) 90   BP Location Right upper arm   BP Upper/Lower Upper   BP Method Automatic   Patient Position Semi fowlers   Cardiac Rhythm Sinus arrythmia  (with PAC's)   Pain Assessment   Pain Assessment None - Denies Pain   Oxygen Therapy   SpO2 95 %   O2 Device None (Room air)

## 2024-04-11 NOTE — PLAN OF CARE
Problem: Discharge Planning  Goal: Discharge to home or other facility with appropriate resources  Outcome: Progressing     Problem: Pain  Goal: Verbalizes/displays adequate comfort level or baseline comfort level  Outcome: Progressing  Flowsheets (Taken 4/11/2024 0413)  Verbalizes/displays adequate comfort level or baseline comfort level:   Encourage patient to monitor pain and request assistance   Assess pain using appropriate pain scale   Administer analgesics based on type and severity of pain and evaluate response   Implement non-pharmacological measures as appropriate and evaluate response   Consider cultural and social influences on pain and pain management   Notify Licensed Independent Practitioner if interventions unsuccessful or patient reports new pain     Problem: Safety - Adult  Goal: Free from fall injury  Outcome: Progressing  Note: High risk for fall.  Maintain all the safety measures.  Call light within reach.  Bed maintain at it's lowest height, locked and alarm on     Problem: Cardiovascular - Adult  Goal: Maintains optimal cardiac output and hemodynamic stability  Outcome: Progressing  Flowsheets (Taken 4/11/2024 0413)  Maintains optimal cardiac output and hemodynamic stability:   Monitor blood pressure and heart rate   Monitor urine output and notify Licensed Independent Practitioner for values outside of normal range   Assess for signs of decreased cardiac output   Administer fluid and/or volume expanders as ordered   Administer vasoactive medications as ordered     Problem: Neurosensory - Adult  Goal: Achieves stable or improved neurological status  Flowsheets (Taken 4/11/2024 0413)  Achieves stable or improved neurological status:   Assess for and report changes in neurological status   Initiate measures to prevent increased intracranial pressure   Maintain blood pressure and fluid volume within ordered parameters to optimize cerebral perfusion and minimize risk of hemorrhage   Monitor

## 2024-04-11 NOTE — DISCHARGE INSTRUCTIONS
Extra Heart Failure sites:     https://[employer].com/publication/?n=008845   --- this is American Heart Association interactive Healthier Living with Heart Failure guidebook.  Please click hyperlink or copy / paste link into search bar. Use your mouse to scroll through the pages.  Lots of information about weight monitoring, diet tips, activity, meds, etc    HF [place] flo  -- this is a free smart phone flo available for iPhone and Android download.  Use your phone to track sodium / fluid intake, zone tool symptom tracking, weights, medications, etc. Click on this hyperlink  HF [place] Flo   for QR code for easy download.       DASH (Dietary Approach to Stop Hypertension) diet --  https://www.nhlbi.nih.gov/education/dash-eating-plan -- this diet is a flexible eating plan that promotes heart healthy eating style.  Click on hyperlink or copy / paste link into search bar.  Lots of low sodium recipes and tips.    https://www.[employer]/recipes  -- more free recipes

## 2024-04-11 NOTE — H&P
V2.0  History and Physical      Name:  Perfecto Enciso /Age/Sex: 1960  (64 y.o. male)   MRN & CSN:  [id number] & [id number] Encounter Date/Time: 4/10/2024 10:13 PM EDT   [facility] PCP: Sergei Burrows MD       Hospital Day: 1    Assessment and Plan:   Perfecto Enciso is a 64 y.o. male with a pmh of hypertension, hyperlipidemia, persistent atrial flutter/A-fib (history of TRAVIS clip), CAD (status post CABG), combined systolic and diastolic heart failure (EF 50% in ), aortic stenosis-s/p AVR, diabetes type 2, osteoarthritis of the knees and obesity presenting with frequent episodes of dizziness, diaphoresis, palpitation and altered mental status for 1 week.        Hospital Problems             Last Modified POA    * (Principal) Near syncope 4/10/2024 Yes       1.  Recurrent episodes of dizziness/altered mental status: This may be due to transient cardiac arrhythmia or nonconvulsive seizure.  Monitor on telemetry.  Check for orthostatic hypotension.  Check TSH.  Consult cardiology and neurology.    2.  Persistent atrial flutter: Status post TRAVIS clip.  Currently on Eliquis.  Continue on metoprolol for rate control.  According to clinic notes, cardiology plan elective ablation and initiation of amiodarone soon.    3.  CAD: Status post CABG.  Stable.  Continue metoprolol, aspirin and Lipitor.    4.  Combined systolic and diastolic heart failure: EF 50% in .  Compensated.  Continue metoprolol, Lasix and lisinopril.    Chronic problems include hypertension, hyperlipidemia, AS (status post AVR), diabetes type 2, GERD, osteoarthritis of the knees and obesity.    DVT prophylaxis: On Eliquis    Disposition:   Current Living situation: He lives with his wife.  He ambulates freely.  Expected Disposition: Home  Estimated D/C: 3 days    Diet No diet orders on file   DVT Prophylaxis [] Lovenox, []  Heparin, [] SCDs, [] Ambulation,  [x] Eliquis, [] Xarelto, [] Coumadin   Code Status Prior   Surrogate
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
## 2024-04-11 NOTE — PROGRESS NOTES
Patient was discharged prior to neurology team staffing and prior to my visit to him preventing ability to perform consultation with history and examination.    Ayden Drake D.O.  [facility]    A copy of this note was provided for the attending.

## 2024-04-11 NOTE — PLAN OF CARE
Problem: Discharge Planning  Goal: Discharge to home or other facility with appropriate resources  4/11/2024 1400 by Brigette Barillas RN  Outcome: Adequate for Discharge  4/11/2024 0413 by Winsome Harris RN  Outcome: Progressing     Problem: Pain  Goal: Verbalizes/displays adequate comfort level or baseline comfort level  4/11/2024 1400 by Brigette Barillas RN  Outcome: Adequate for Discharge  4/11/2024 0413 by Winsome Harris RN  Outcome: Progressing  Flowsheets (Taken 4/11/2024 0413)  Verbalizes/displays adequate comfort level or baseline comfort level:   Encourage patient to monitor pain and request assistance   Assess pain using appropriate pain scale   Administer analgesics based on type and severity of pain and evaluate response   Implement non-pharmacological measures as appropriate and evaluate response   Consider cultural and social influences on pain and pain management   Notify Licensed Independent Practitioner if interventions unsuccessful or patient reports new pain     Problem: Safety - Adult  Goal: Free from fall injury  4/11/2024 1400 by Brigette Barillas RN  Outcome: Adequate for Discharge  4/11/2024 0413 by Winsome Harris RN  Outcome: Progressing  Note: High risk for fall.  Maintain all the safety measures.  Call light within reach.  Bed maintain at it's lowest height, locked and alarm on     Problem: Neurosensory - Adult  Goal: Achieves stable or improved neurological status  4/11/2024 1400 by Brigette Barillas RN  Outcome: Adequate for Discharge  4/11/2024 0413 by Winsome Harris RN  Flowsheets (Taken 4/11/2024 0413)  Achieves stable or improved neurological status:   Assess for and report changes in neurological status   Initiate measures to prevent increased intracranial pressure   Maintain blood pressure and fluid volume within ordered parameters to optimize cerebral perfusion and minimize risk of hemorrhage   Monitor temperature, glucose, and sodium. Initiate appropriate

## 2024-04-11 NOTE — PROGRESS NOTES
V2.0    [facility] Progress Note      Name:  Perfecto Enciso /Age/Sex: 1960  (64 y.o. male)   MRN & CSN:  [id number] & [id number] Encounter Date/Time: 2024 9:27 AM EDT   Location:  6313/6313-01 PCP: Sergei Burrows MD     Attending:Yanique Sarkar MD       Hospital Day: 2    Assessment and Recommendations   Perfecto Enciso is a 64 y.o. male with pmh of with a past medical history of hypertension hyperlipidemia, A-fib, coronary artery disease, heart failure, aortic stenosis s/p aortic valve replacement, diabetes who presented with dizziness diaphoresis palpitation when patient ambulates along with episodes of staring.  Patient does not lose consciousness.  Symptoms concerning for orthostatic hypotension.  Cardiology was consulted.  They recommend holding lisinopril.  Patient takes Lasix at home for swelling.  Will switch Lasix to as needed.  Patient counseled to use CLARISSE hose.  Patient will follow-up with his PCP to monitor his blood pressure.    Plan:     Recurrent episodes of dizziness with palpitation and diaphoresis-occurring when patient ambulates along with episodes of staring for few seconds.  -Given this occurs when patient ambulates concern for orthostatic hypotension  -Over here orthostatic was done which was negative  -Patient is on lisinopril and Lasix which have been held  -Cardiology consulted  -Unlikely seizure    Persistent atrial flutter  -Patient going for ablation on   -Continue beta-blocker    Coronary artery disease status post CABG  -Continue home medication    Heart failure preserved EF  -Lasix switched to as needed    History of aortic stenosis s/p AVR    I spent > 55  minutes in the care of this patient.  Over 50% of that time was in face-to-face counseling regarding disease process, diagnostic testing, preventative measures, and answering patient and family questions.     Diet ADULT DIET; Regular; Low Fat/Low Chol/High Fiber/AIDA   DVT Prophylaxis [] Lovenox, []  Heparin,

## 2024-04-12 NOTE — DISCHARGE SUMMARY
V2.0  Discharge Summary    Name:  Perfecto Enciso /Age/Sex: 1960 (64 y.o. male)   Admit Date: 4/10/2024  Discharge Date: 24    MRN & CSN:  [id number] & [id number] Encounter Date and Time 24 7:53 AM EDT    Attending:  No att. providers found Discharging Provider: Yanique Sarkar MD       Hospital Course:     Brief HPI: Perfecto Enciso is a 64 y.o. male with pmh of with a past medical history of hypertension hyperlipidemia, A-fib, coronary artery disease, heart failure, aortic stenosis s/p aortic valve replacement, diabetes who presented with dizziness diaphoresis palpitation when patient ambulates along with episodes of staring.  Patient does not lose consciousness.  Symptoms concerning for orthostatic hypotension.  Cardiology was consulted.  They recommend holding lisinopril.  Patient takes Lasix at home for swelling.  Will switch Lasix to as needed.  Patient counseled to use CLARISSE hose.  Patient will follow-up with his PCP to monitor his blood pressure.     Plan:      Recurrent episodes of dizziness with palpitation and diaphoresis-occurring when patient ambulates along with episodes of staring for few seconds.  -Given this occurs when patient ambulates concern for orthostatic hypotension  -Over here orthostatic was done which was negative  -Patient is on lisinopril and Lasix which have been held  -Cardiology consulted  -Unlikely seizure     Persistent atrial flutter  -Patient going for ablation on   -Continue beta-blocker     Coronary artery disease status post CABG  -Continue home medication     Heart failure preserved EF  -Lasix switched to as needed     History of aortic stenosis s/p AVR  The patient expressed appropriate understanding of, and agreement with the discharge recommendations, medications, and plan.     Consults this admission:  IP CONSULT TO CARDIOLOGY  IP CONSULT TO NEUROLOGY    Discharge Diagnosis:   Near syncope        Discharge Instruction:   Follow up appointments:

## 2024-04-22 ENCOUNTER — TELEPHONE (OUTPATIENT)
Dept: CARDIOLOGY CLINIC | Age: 64
End: 2024-04-22

## 2024-04-22 NOTE — TELEPHONE ENCOUNTER
LVM asking pt to call back.  Need to confirm date/time and preop instructions for upcoming procedure with Dr. Crawford.

## 2024-04-23 ENCOUNTER — HOSPITAL ENCOUNTER (OUTPATIENT)
Dept: CT IMAGING | Age: 64
Discharge: HOME | End: 2024-04-23
Payer: MEDICARE

## 2024-04-23 DIAGNOSIS — I48.19 PERSISTENT ATRIAL FIBRILLATION (HCC): ICD-10-CM

## 2024-04-23 LAB
ANION GAP SERPL CALCULATED.3IONS-SCNC: 14 MMOL/L (ref 3–16)
BUN SERPL-MCNC: 20 MG/DL (ref 7–20)
CALCIUM SERPL-MCNC: 9.6 MG/DL (ref 8.3–10.6)
CHLORIDE SERPL-SCNC: 103 MMOL/L (ref 99–110)
CO2 SERPL-SCNC: 21 MMOL/L (ref 21–32)
CREAT SERPL-MCNC: 0.9 MG/DL (ref 0.8–1.3)
DEPRECATED RDW RBC AUTO: 15 % (ref 12.4–15.4)
GFR SERPLBLD CREATININE-BSD FMLA CKD-EPI: >90 ML/MIN/{1.73_M2}
GLUCOSE SERPL-MCNC: 116 MG/DL (ref 70–99)
HCT VFR BLD AUTO: 43.6 % (ref 40.5–52.5)
HGB BLD-MCNC: 14.5 G/DL (ref 13.5–17.5)
INR PPP: 1.07 (ref 0.85–1.15)
MCH RBC QN AUTO: 29.4 PG (ref 26–34)
MCHC RBC AUTO-ENTMCNC: 33.2 G/DL (ref 31–36)
MCV RBC AUTO: 88.4 FL (ref 80–100)
PLATELET # BLD AUTO: 250 K/UL (ref 135–450)
PMV BLD AUTO: 9.6 FL (ref 5–10.5)
POTASSIUM SERPL-SCNC: 4.6 MMOL/L (ref 3.5–5.1)
PROTHROMBIN TIME: 14.2 SEC (ref 11.9–14.9)
RBC # BLD AUTO: 4.93 M/UL (ref 4.2–5.9)
SODIUM SERPL-SCNC: 138 MMOL/L (ref 136–145)
WBC # BLD AUTO: 7.6 K/UL (ref 4–11)

## 2024-04-23 PROCEDURE — 6360000004 HC RX CONTRAST MEDICATION

## 2024-04-23 PROCEDURE — 71275 CT ANGIOGRAPHY CHEST: CPT

## 2024-04-23 RX ADMIN — IOPAMIDOL 75 ML: 755 INJECTION, SOLUTION INTRAVENOUS at 08:44

## 2024-04-30 ENCOUNTER — HOSPITAL ENCOUNTER (OUTPATIENT)
Dept: CARDIAC CATH/INVASIVE PROCEDURES | Age: 64
Discharge: HOME | End: 2024-04-30
Attending: INTERNAL MEDICINE | Admitting: INTERNAL MEDICINE
Payer: MEDICARE

## 2024-04-30 ENCOUNTER — ANESTHESIA (OUTPATIENT)
Dept: CARDIAC CATH/INVASIVE PROCEDURES | Age: 64
End: 2024-04-30
Payer: MEDICARE

## 2024-04-30 ENCOUNTER — ANESTHESIA EVENT (OUTPATIENT)
Dept: CARDIAC CATH/INVASIVE PROCEDURES | Age: 64
End: 2024-04-30
Payer: MEDICARE

## 2024-04-30 VITALS
OXYGEN SATURATION: 96 % | HEART RATE: 93 BPM | BODY MASS INDEX: 39.36 KG/M2 | RESPIRATION RATE: 19 BRPM | TEMPERATURE: 97.9 F | WEIGHT: 306.7 LBS | HEIGHT: 74 IN | SYSTOLIC BLOOD PRESSURE: 124 MMHG | DIASTOLIC BLOOD PRESSURE: 84 MMHG

## 2024-04-30 PROBLEM — I48.3 TYPICAL ATRIAL FLUTTER (HCC): Status: ACTIVE | Noted: 2024-04-30

## 2024-04-30 LAB
ABO + RH BLD: NORMAL
ANION GAP SERPL CALCULATED.3IONS-SCNC: 12 MMOL/L (ref 3–16)
BLD GP AB SCN SERPL QL: NORMAL
BUN SERPL-MCNC: 20 MG/DL (ref 7–20)
CALCIUM SERPL-MCNC: 9.3 MG/DL (ref 8.3–10.6)
CHLORIDE SERPL-SCNC: 103 MMOL/L (ref 99–110)
CO2 SERPL-SCNC: 23 MMOL/L (ref 21–32)
CREAT SERPL-MCNC: 1 MG/DL (ref 0.8–1.3)
DEPRECATED RDW RBC AUTO: 14.8 % (ref 12.4–15.4)
GFR SERPLBLD CREATININE-BSD FMLA CKD-EPI: 83 ML/MIN/{1.73_M2}
GLUCOSE SERPL-MCNC: 124 MG/DL (ref 70–99)
HCT VFR BLD AUTO: 42.1 % (ref 40.5–52.5)
HGB BLD-MCNC: 14.1 G/DL (ref 13.5–17.5)
MCH RBC QN AUTO: 29.3 PG (ref 26–34)
MCHC RBC AUTO-ENTMCNC: 33.4 G/DL (ref 31–36)
MCV RBC AUTO: 87.7 FL (ref 80–100)
PLATELET # BLD AUTO: 273 K/UL (ref 135–450)
PMV BLD AUTO: 8.9 FL (ref 5–10.5)
POC ACT LR: 258 SEC
POC ACT LR: 262 SEC
POC ACT LR: 277 SEC
POC ACT LR: 334 SEC
POC ACT LR: 347 SEC
POC ACT LR: 357 SEC
POC ACT LR: 357 SEC
POTASSIUM SERPL-SCNC: 4.1 MMOL/L (ref 3.5–5.1)
RBC # BLD AUTO: 4.8 M/UL (ref 4.2–5.9)
SODIUM SERPL-SCNC: 138 MMOL/L (ref 136–145)
WBC # BLD AUTO: 9.4 K/UL (ref 4–11)

## 2024-04-30 PROCEDURE — 93005 ELECTROCARDIOGRAM TRACING: CPT | Performed by: INTERNAL MEDICINE

## 2024-04-30 PROCEDURE — C1759 CATH, INTRA ECHOCARDIOGRAPHY: HCPCS | Performed by: INTERNAL MEDICINE

## 2024-04-30 PROCEDURE — 3700000001 HC ADD 15 MINUTES (ANESTHESIA): Performed by: ANESTHESIOLOGY

## 2024-04-30 PROCEDURE — 93656 COMPRE EP EVAL ABLTJ ATR FIB: CPT

## 2024-04-30 PROCEDURE — 93657 TX L/R ATRIAL FIB ADDL: CPT

## 2024-04-30 PROCEDURE — 93655 ICAR CATH ABLTJ DSCRT ARRHYT: CPT

## 2024-04-30 PROCEDURE — 93622 COMP EP EVAL L VENTR PAC&REC: CPT | Performed by: INTERNAL MEDICINE

## 2024-04-30 PROCEDURE — 2500000003 HC RX 250 WO HCPCS

## 2024-04-30 PROCEDURE — 85027 COMPLETE CBC AUTOMATED: CPT

## 2024-04-30 PROCEDURE — 85347 COAGULATION TIME ACTIVATED: CPT

## 2024-04-30 PROCEDURE — 6360000002 HC RX W HCPCS

## 2024-04-30 PROCEDURE — 93622 COMP EP EVAL L VENTR PAC&REC: CPT

## 2024-04-30 PROCEDURE — 3700000000 HC ANESTHESIA ATTENDED CARE: Performed by: ANESTHESIOLOGY

## 2024-04-30 PROCEDURE — 93656 COMPRE EP EVAL ABLTJ ATR FIB: CPT | Performed by: INTERNAL MEDICINE

## 2024-04-30 PROCEDURE — C1894 INTRO/SHEATH, NON-LASER: HCPCS | Performed by: INTERNAL MEDICINE

## 2024-04-30 PROCEDURE — 2709999900 HC NON-CHARGEABLE SUPPLY: Performed by: INTERNAL MEDICINE

## 2024-04-30 PROCEDURE — C1760 CLOSURE DEV, VASC: HCPCS | Performed by: INTERNAL MEDICINE

## 2024-04-30 PROCEDURE — 80048 BASIC METABOLIC PNL TOTAL CA: CPT

## 2024-04-30 PROCEDURE — 86850 RBC ANTIBODY SCREEN: CPT

## 2024-04-30 PROCEDURE — C1732 CATH, EP, DIAG/ABL, 3D/VECT: HCPCS | Performed by: INTERNAL MEDICINE

## 2024-04-30 PROCEDURE — 86901 BLOOD TYPING SEROLOGIC RH(D): CPT

## 2024-04-30 PROCEDURE — 86900 BLOOD TYPING SEROLOGIC ABO: CPT

## 2024-04-30 PROCEDURE — 93657 TX L/R ATRIAL FIB ADDL: CPT | Performed by: INTERNAL MEDICINE

## 2024-04-30 PROCEDURE — G0269 OCCLUSIVE DEVICE IN VEIN ART: HCPCS

## 2024-04-30 PROCEDURE — C1766 INTRO/SHEATH,STRBLE,NON-PEEL: HCPCS | Performed by: INTERNAL MEDICINE

## 2024-04-30 PROCEDURE — 93623 PRGRMD STIMJ&PACG IV RX NFS: CPT | Performed by: INTERNAL MEDICINE

## 2024-04-30 PROCEDURE — 93655 ICAR CATH ABLTJ DSCRT ARRHYT: CPT | Performed by: INTERNAL MEDICINE

## 2024-04-30 PROCEDURE — 2580000003 HC RX 258: Performed by: INTERNAL MEDICINE

## 2024-04-30 PROCEDURE — 7100000001 HC PACU RECOVERY - ADDTL 15 MIN: Performed by: ANESTHESIOLOGY

## 2024-04-30 PROCEDURE — 7100000000 HC PACU RECOVERY - FIRST 15 MIN: Performed by: ANESTHESIOLOGY

## 2024-04-30 PROCEDURE — 2720000010 HC SURG SUPPLY STERILE: Performed by: INTERNAL MEDICINE

## 2024-04-30 RX ORDER — SODIUM CHLORIDE 0.9 % (FLUSH) 0.9 %
5-40 SYRINGE (ML) INJECTION PRN
Status: DISCONTINUED | OUTPATIENT
Start: 2024-04-30 | End: 2024-04-30 | Stop reason: HOSPADM

## 2024-04-30 RX ORDER — ROCURONIUM BROMIDE 10 MG/ML
INJECTION, SOLUTION INTRAVENOUS PRN
Status: DISCONTINUED | OUTPATIENT
Start: 2024-04-30 | End: 2024-04-30 | Stop reason: SDUPTHER

## 2024-04-30 RX ORDER — ONDANSETRON 2 MG/ML
INJECTION INTRAMUSCULAR; INTRAVENOUS PRN
Status: DISCONTINUED | OUTPATIENT
Start: 2024-04-30 | End: 2024-04-30 | Stop reason: SDUPTHER

## 2024-04-30 RX ORDER — FENTANYL CITRATE 50 UG/ML
INJECTION, SOLUTION INTRAMUSCULAR; INTRAVENOUS PRN
Status: DISCONTINUED | OUTPATIENT
Start: 2024-04-30 | End: 2024-04-30 | Stop reason: SDUPTHER

## 2024-04-30 RX ORDER — SODIUM CHLORIDE 0.9 % (FLUSH) 0.9 %
5-40 SYRINGE (ML) INJECTION EVERY 12 HOURS SCHEDULED
Status: DISCONTINUED | OUTPATIENT
Start: 2024-04-30 | End: 2024-04-30 | Stop reason: HOSPADM

## 2024-04-30 RX ORDER — OXYCODONE HYDROCHLORIDE 5 MG/1
5 TABLET ORAL
Status: DISCONTINUED | OUTPATIENT
Start: 2024-04-30 | End: 2024-04-30 | Stop reason: HOSPADM

## 2024-04-30 RX ORDER — PROPOFOL 10 MG/ML
INJECTION, EMULSION INTRAVENOUS CONTINUOUS PRN
Status: DISCONTINUED | OUTPATIENT
Start: 2024-04-30 | End: 2024-04-30 | Stop reason: SDUPTHER

## 2024-04-30 RX ORDER — MEPERIDINE HYDROCHLORIDE 50 MG/ML
12.5 INJECTION INTRAMUSCULAR; INTRAVENOUS; SUBCUTANEOUS EVERY 5 MIN PRN
Status: DISCONTINUED | OUTPATIENT
Start: 2024-04-30 | End: 2024-04-30 | Stop reason: HOSPADM

## 2024-04-30 RX ORDER — ESMOLOL HYDROCHLORIDE 10 MG/ML
INJECTION INTRAVENOUS PRN
Status: DISCONTINUED | OUTPATIENT
Start: 2024-04-30 | End: 2024-04-30 | Stop reason: SDUPTHER

## 2024-04-30 RX ORDER — SODIUM CHLORIDE 9 MG/ML
INJECTION, SOLUTION INTRAVENOUS PRN
Status: DISCONTINUED | OUTPATIENT
Start: 2024-04-30 | End: 2024-04-30 | Stop reason: HOSPADM

## 2024-04-30 RX ORDER — ACETAMINOPHEN 325 MG/1
650 TABLET ORAL EVERY 4 HOURS PRN
Status: DISCONTINUED | OUTPATIENT
Start: 2024-04-30 | End: 2024-04-30 | Stop reason: HOSPADM

## 2024-04-30 RX ORDER — POLYETHYLENE GLYCOL 3350 17 G/17G
17 POWDER, FOR SOLUTION ORAL DAILY PRN
COMMUNITY

## 2024-04-30 RX ORDER — SUCCINYLCHOLINE CHLORIDE 20 MG/ML
INJECTION INTRAMUSCULAR; INTRAVENOUS PRN
Status: DISCONTINUED | OUTPATIENT
Start: 2024-04-30 | End: 2024-04-30 | Stop reason: SDUPTHER

## 2024-04-30 RX ORDER — IPRATROPIUM BROMIDE AND ALBUTEROL SULFATE 2.5; .5 MG/3ML; MG/3ML
1 SOLUTION RESPIRATORY (INHALATION)
Status: DISCONTINUED | OUTPATIENT
Start: 2024-04-30 | End: 2024-04-30 | Stop reason: HOSPADM

## 2024-04-30 RX ORDER — PROPOFOL 10 MG/ML
INJECTION, EMULSION INTRAVENOUS PRN
Status: DISCONTINUED | OUTPATIENT
Start: 2024-04-30 | End: 2024-04-30 | Stop reason: SDUPTHER

## 2024-04-30 RX ORDER — ONDANSETRON 2 MG/ML
4 INJECTION INTRAMUSCULAR; INTRAVENOUS
Status: DISCONTINUED | OUTPATIENT
Start: 2024-04-30 | End: 2024-04-30 | Stop reason: HOSPADM

## 2024-04-30 RX ORDER — MIDAZOLAM HYDROCHLORIDE 1 MG/ML
INJECTION INTRAMUSCULAR; INTRAVENOUS PRN
Status: DISCONTINUED | OUTPATIENT
Start: 2024-04-30 | End: 2024-04-30 | Stop reason: SDUPTHER

## 2024-04-30 RX ORDER — NALOXONE HYDROCHLORIDE 0.4 MG/ML
INJECTION, SOLUTION INTRAMUSCULAR; INTRAVENOUS; SUBCUTANEOUS PRN
Status: DISCONTINUED | OUTPATIENT
Start: 2024-04-30 | End: 2024-04-30 | Stop reason: HOSPADM

## 2024-04-30 RX ORDER — FUROSEMIDE 10 MG/ML
INJECTION INTRAMUSCULAR; INTRAVENOUS PRN
Status: DISCONTINUED | OUTPATIENT
Start: 2024-04-30 | End: 2024-04-30 | Stop reason: SDUPTHER

## 2024-04-30 RX ORDER — OXYCODONE HYDROCHLORIDE 5 MG/1
10 TABLET ORAL PRN
Status: DISCONTINUED | OUTPATIENT
Start: 2024-04-30 | End: 2024-04-30 | Stop reason: HOSPADM

## 2024-04-30 RX ORDER — PHENYLEPHRINE HCL IN 0.9% NACL 1 MG/10 ML
SYRINGE (ML) INTRAVENOUS PRN
Status: DISCONTINUED | OUTPATIENT
Start: 2024-04-30 | End: 2024-04-30 | Stop reason: SDUPTHER

## 2024-04-30 RX ORDER — PROTAMINE SULFATE 10 MG/ML
INJECTION, SOLUTION INTRAVENOUS PRN
Status: DISCONTINUED | OUTPATIENT
Start: 2024-04-30 | End: 2024-04-30 | Stop reason: SDUPTHER

## 2024-04-30 RX ORDER — PROCHLORPERAZINE EDISYLATE 5 MG/ML
5 INJECTION INTRAMUSCULAR; INTRAVENOUS
Status: DISCONTINUED | OUTPATIENT
Start: 2024-04-30 | End: 2024-04-30 | Stop reason: HOSPADM

## 2024-04-30 RX ORDER — LIDOCAINE HYDROCHLORIDE 20 MG/ML
INJECTION, SOLUTION EPIDURAL; INFILTRATION; INTRACAUDAL; PERINEURAL PRN
Status: DISCONTINUED | OUTPATIENT
Start: 2024-04-30 | End: 2024-04-30 | Stop reason: SDUPTHER

## 2024-04-30 RX ADMIN — PROPOFOL 50 MCG/KG/MIN: 10 INJECTION, EMULSION INTRAVENOUS at 07:57

## 2024-04-30 RX ADMIN — Medication 200 MCG: at 09:00

## 2024-04-30 RX ADMIN — Medication 200 MCG: at 09:43

## 2024-04-30 RX ADMIN — ESMOLOL HYDROCHLORIDE 30 MG: 100 INJECTION, SOLUTION INTRAVENOUS at 07:56

## 2024-04-30 RX ADMIN — MIDAZOLAM 2 MG: 1 INJECTION INTRAMUSCULAR; INTRAVENOUS at 07:49

## 2024-04-30 RX ADMIN — ROCURONIUM BROMIDE 20 MG: 50 INJECTION, SOLUTION INTRAVENOUS at 09:38

## 2024-04-30 RX ADMIN — PROPOFOL 180 MG: 10 INJECTION, EMULSION INTRAVENOUS at 07:56

## 2024-04-30 RX ADMIN — LIDOCAINE HYDROCHLORIDE 100 MG: 20 INJECTION, SOLUTION EPIDURAL; INFILTRATION; INTRACAUDAL at 07:56

## 2024-04-30 RX ADMIN — Medication 200 MCG: at 08:14

## 2024-04-30 RX ADMIN — ESMOLOL HYDROCHLORIDE 30 MG: 100 INJECTION, SOLUTION INTRAVENOUS at 08:05

## 2024-04-30 RX ADMIN — ROCURONIUM BROMIDE 45 MG: 50 INJECTION, SOLUTION INTRAVENOUS at 08:01

## 2024-04-30 RX ADMIN — SUCCINYLCHOLINE CHLORIDE 100 MG: 20 INJECTION, SOLUTION INTRAMUSCULAR; INTRAVENOUS at 07:56

## 2024-04-30 RX ADMIN — SODIUM CHLORIDE: 9 INJECTION, SOLUTION INTRAVENOUS at 07:44

## 2024-04-30 RX ADMIN — SUGAMMADEX 200 MG: 100 INJECTION, SOLUTION INTRAVENOUS at 10:17

## 2024-04-30 RX ADMIN — ONDANSETRON 4 MG: 2 INJECTION INTRAMUSCULAR; INTRAVENOUS at 08:00

## 2024-04-30 RX ADMIN — SODIUM CHLORIDE: 9 INJECTION, SOLUTION INTRAVENOUS at 09:38

## 2024-04-30 RX ADMIN — FENTANYL CITRATE 50 MCG: 50 INJECTION, SOLUTION INTRAMUSCULAR; INTRAVENOUS at 08:03

## 2024-04-30 RX ADMIN — PROPOFOL 50 MG: 10 INJECTION, EMULSION INTRAVENOUS at 08:48

## 2024-04-30 RX ADMIN — Medication 200 MCG: at 09:53

## 2024-04-30 RX ADMIN — Medication 200 MCG: at 09:28

## 2024-04-30 RX ADMIN — FUROSEMIDE 20 MG: 10 INJECTION, SOLUTION INTRAMUSCULAR; INTRAVENOUS at 10:03

## 2024-04-30 RX ADMIN — FENTANYL CITRATE 50 MCG: 50 INJECTION, SOLUTION INTRAMUSCULAR; INTRAVENOUS at 07:54

## 2024-04-30 RX ADMIN — PROTAMINE SULFATE 30 MG: 10 INJECTION, SOLUTION INTRAVENOUS at 10:03

## 2024-04-30 RX ADMIN — ROCURONIUM BROMIDE 50 MG: 50 INJECTION, SOLUTION INTRAVENOUS at 08:48

## 2024-04-30 RX ADMIN — ROCURONIUM BROMIDE 5 MG: 50 INJECTION, SOLUTION INTRAVENOUS at 07:56

## 2024-04-30 ASSESSMENT — PAIN SCALES - GENERAL: PAINLEVEL_OUTOF10: 4

## 2024-04-30 NOTE — PROGRESS NOTES
Pt brought to PACU. Report obtained from OR RN and anesthesia. Pt placed on monitor and O2 at  4L

## 2024-04-30 NOTE — PROGRESS NOTES
Discharge instructions discussed with patient and his family. Patient up to restroom at this time. Right groin site remains soft and dry, dressing intact. Will continue to monitor.

## 2024-04-30 NOTE — DISCHARGE INSTRUCTIONS
[facility]  Electrophysiology    Dr. Geovany De León, CNP  Anna Ron, CNP  Neisha Hunt, RN  [phone]    Cardiac Catheter Ablation  Discharge Instructions      WHAT YOU SHOULD KNOW:   Your heart has a special electrical system built into it that controls your heart rhythm. Sometimes there is a problem with this electrical system in the heart muscle. This problem may cause an arrhythmia, or abnormal heart rhythm. If medicine does not correct the problem, or if you do not wish to take medicines long-term, you may need a cardiac ablation. An ablation may also be called a catheter ablation, or a radiofrequency ablation.    An ablation procedure is usually done at the same time as an electrophysiology study. This test is used to \"map out\" the electrical pathways in your heart that control your heart rhythm. This test helps your doctor find the exact spot where the ablation needs to be done. During an ablation, energy is sent through a special catheter to the area of your heart that has the electrical problem. This energy causes a tiny area of the heart muscle to scar, stopping the electrical problem and allowing your heart to beat regularly. Ask your caregiver for more information about your heart problem, and tests and treatments that may be done for it.       AFTER YOU LEAVE:     Home care:    For the next 24 hours please hold pressure to your groin when you cough, sneeze, or change positions.    Activity: After your ablation, rest for one to two days. Avoid using stairs for a few days. When you must use stairs, step up with the leg that was not used for the ablation. Straighten this leg to move the other leg up to the next step without putting stress on it.    No strenuous activity for 1 week, no lifting greater than 10lbs for 1 week.     Do not strain while having a bowel movement. If you are constipated, take an over-the-counter stool softener such as Metamucil or Citracel.

## 2024-04-30 NOTE — INTERVAL H&P NOTE
Update History & Physical    The patient's History and Physical of April 10, was reviewed with the patient and I examined the patient. There was no change. The surgical site was confirmed by the patient and me.     Plan: The risks, benefits, expected outcome, and alternative to the recommended procedure have been discussed with the patient. Patient understands and wants to proceed with the procedure.     Electronically signed by Geovany Green MD on 4/30/2024 at 7:44 AM

## 2024-04-30 NOTE — ANESTHESIA POSTPROCEDURE EVALUATION
Department of Anesthesiology  Postprocedure Note    Patient: Perfecto Enciso  MRN: [id number]  YOB: 1960  Date of evaluation: 4/30/2024    Procedure Summary       Date: 04/30/24 Room / Location: [facility] Cardiac Cath Lab    Anesthesia Start: 0744 Anesthesia Stop: 1028    Procedure: ABLATION Diagnosis: Other persistent atrial fibrillation    Scheduled Providers: Isacc Humphries MD Responsible Provider: Isacc Humphries MD    Anesthesia Type: general ASA Status: 3            Anesthesia Type: No value filed.    Anthony Phase I: Anthony Score: 10    Anthony Phase II:      Anesthesia Post Evaluation    Patient location during evaluation: PACU  Patient participation: complete - patient participated  Level of consciousness: awake and alert  Airway patency: patent  Nausea & Vomiting: no nausea and no vomiting  Cardiovascular status: hemodynamically stable  Respiratory status: acceptable  Hydration status: euvolemic  Pain management: adequate    No notable events documented.

## 2024-04-30 NOTE — PROCEDURES
inferior, right superior, and right inferior) had PV fascicles, the triggers for the atrial fibrillation. Then wide area circumferential ablation for the left sided pulmonary veins along with duke ablation were performed which resulted in electrical isolation of these pulmonary veins and eradication of the PV fascicles. Similarly, wide area circumferential ablations for the right sided pulmonary veins along with duke ablation were performed which resulted in electrical isolation of these pulmonary veins and eradication of the PV fascicles.  Using Carto-smart touch module, care was taken to maintain the contact force between 8gms and 20gms during all the ablative lesions. Entrance block developed during RF energy delivery.  While ablating close to right inferior pulmonary vein, close to the posterior wall, he degenerated into atrial fibrillation.  Further ablation was continued in this region that terminated his atrial fibrillation to normal sinus rhythm.    Following this, additional radiofrequency ablation targeting CAFE potentials outside the pulmonary veins along the inter atrial septum and SVC side of the septum were performed as a separate mechanism due to extensive fragmentation in sinus rhythm and CFAE potential during Afib.     Entrance and exit block were demonstrated in all 4 pulmonary veins. Maximum output (20mA) pacing in the L antrum and R antrum were performed and showed dissociation from the rest of the left atrium. This was checked circumferentially around the antrums and verified many times.     Adenosine was used in all 4 pulmonary veins to rule out any dormant conduction.  During adenosine infusion, ablation catheter was placed into the left ventricle and pacing from the left ventricular was performed to obtain VA block. With adenosine, there is hemodynamic response and there is no evidence of document conduction. IV Isuprel up to a maximum of 10mcg/min was infused post ablation, for 10

## 2024-04-30 NOTE — PROGRESS NOTES
Patient completed the 4 hour holding period with no evidence of bleeding or swelling at site. Dressing remained intact. VSS on room air. Up to restroom. Tolerated peanut butter crackers and fluids. Declined ordering a full meal, preferred snacks until he got home. Patient to take Eliquis this evening as directed. IV's removed after getting dressed. Belongings gathered. Family members arrived with vehicle to front lobby. Patient wheeled to front entrance via wheelchair.

## 2024-04-30 NOTE — ANESTHESIA PRE PROCEDURE
MD at [facility] ENDOSCOPY       Social History:    Social History     Tobacco Use   • Smoking status: Former     Current packs/day: 1.50     Average packs/day: 1.5 packs/day for 3.3 years (4.9 ttl pk-yrs)     Types: Cigarettes     Start date: 1/20/2021   • Smokeless tobacco: Never   Substance Use Topics   • Alcohol use: Not Currently                                Counseling given: Not Answered      Vital Signs (Current):   Vitals:    04/30/24 0639   Weight: (!) 139.1 kg (306 lb 11.2 oz)   Height: 1.88 m (6' 2\")                                              BP Readings from Last 3 Encounters:   04/11/24 (!) 153/90   03/14/24 120/82   02/07/24 110/70       NPO Status:                                                                                 BMI:   Wt Readings from Last 3 Encounters:   04/30/24 (!) 139.1 kg (306 lb 11.2 oz)   04/10/24 125.7 kg (277 lb 3.2 oz)   03/14/24 (!) 138.3 kg (305 lb)     Body mass index is 39.38 kg/m².    CBC:   Lab Results   Component Value Date/Time    WBC 7.6 04/23/2024 09:45 AM    RBC 4.93 04/23/2024 09:45 AM    HGB 14.5 04/23/2024 09:45 AM    HCT 43.6 04/23/2024 09:45 AM    MCV 88.4 04/23/2024 09:45 AM    RDW 15.0 04/23/2024 09:45 AM     04/23/2024 09:45 AM       CMP:   Lab Results   Component Value Date/Time     04/23/2024 09:45 AM    K 4.6 04/23/2024 09:45 AM    K 3.8 04/11/2024 05:08 AM     04/23/2024 09:45 AM    CO2 21 04/23/2024 09:45 AM    BUN 20 04/23/2024 09:45 AM    CREATININE 0.9 04/23/2024 09:45 AM    GFRAA >60 10/08/2022 07:13 AM    AGRATIO 1.1 10/08/2022 07:13 AM    LABGLOM >90 04/23/2024 09:45 AM    GLUCOSE 116 04/23/2024 09:45 AM    PROT 6.8 10/08/2022 07:13 AM    PROT 8.0 01/03/2013 01:20 PM    CALCIUM 9.6 04/23/2024 09:45 AM    BILITOT 0.3 10/08/2022 07:13 AM    ALKPHOS 107 10/08/2022 07:13 AM    AST 16 10/08/2022 07:13 AM    ALT 13 10/08/2022 07:13 AM       POC Tests: No results for input(s): \"POCGLU\", \"POCNA\", \"POCK\", \"POCCL\", \"POCBUN\", \"POCHEMO\",

## 2024-05-01 ENCOUNTER — TELEPHONE (OUTPATIENT)
Dept: CARDIOLOGY CLINIC | Age: 64
End: 2024-05-01

## 2024-05-01 LAB
EKG ATRIAL RATE: 300 BPM
EKG DIAGNOSIS: NORMAL
EKG P AXIS: -86 DEGREES
EKG Q-T INTERVAL: 364 MS
EKG QRS DURATION: 84 MS
EKG QTC CALCULATION (BAZETT): 422 MS
EKG R AXIS: 42 DEGREES
EKG T AXIS: 10 DEGREES
EKG VENTRICULAR RATE: 81 BPM

## 2024-05-01 PROCEDURE — 93010 ELECTROCARDIOGRAM REPORT: CPT | Performed by: INTERNAL MEDICINE

## 2024-05-01 NOTE — TELEPHONE ENCOUNTER
Post Ablation Follow up Phone Call    Date of Procedure: 4/30/24    Procedure: AF ablation    Date of Call: 5/1/24    Catheter Ablation  Sore throat or difficulty swallowing? (This is normal for a couple of days post procedure - if it continues, we want to know).    Ongoing CP? (This is normal for a couple of days post procedure - if it continues, we want to know)    Ongoing SOB? Any s/s of heart failure? Swelling in feet ankles or abdomen? Unable to lie flat in bed? SOB with exertion or all the time?    How does the groin look? Any swelling, bleeding or drainage? (A small lump is ok.)    Remind no heavy lifting >10# for 7 days post procedure.     Make sure they miss NO dose of blood thinners if they are on one.    Make sure they filled any medications that were prescribed (Protonix, Lasix, flecainide etc.)    Remind of follow up appointment.

## 2024-05-07 ENCOUNTER — OFFICE VISIT (OUTPATIENT)
Dept: CARDIOLOGY CLINIC | Age: 64
End: 2024-05-07
Payer: MEDICARE

## 2024-05-07 VITALS
DIASTOLIC BLOOD PRESSURE: 70 MMHG | HEART RATE: 90 BPM | SYSTOLIC BLOOD PRESSURE: 118 MMHG | BODY MASS INDEX: 38.77 KG/M2 | WEIGHT: 302 LBS

## 2024-05-07 DIAGNOSIS — I25.10 CORONARY ARTERY DISEASE INVOLVING NATIVE CORONARY ARTERY OF NATIVE HEART WITHOUT ANGINA PECTORIS: ICD-10-CM

## 2024-05-07 DIAGNOSIS — I48.0 PAROXYSMAL ATRIAL FIBRILLATION (HCC): Primary | ICD-10-CM

## 2024-05-07 DIAGNOSIS — Z79.01 ON CONTINUOUS ORAL ANTICOAGULATION: ICD-10-CM

## 2024-05-07 DIAGNOSIS — I10 ESSENTIAL HYPERTENSION: ICD-10-CM

## 2024-05-07 DIAGNOSIS — I50.32 CHRONIC DIASTOLIC CHF (CONGESTIVE HEART FAILURE) (HCC): ICD-10-CM

## 2024-05-07 DIAGNOSIS — Z98.890 HISTORY OF LEFT ATRIAL APPENDAGE CLOSURE: ICD-10-CM

## 2024-05-07 DIAGNOSIS — I48.3 TYPICAL ATRIAL FLUTTER (HCC): ICD-10-CM

## 2024-05-07 PROCEDURE — G8427 DOCREV CUR MEDS BY ELIG CLIN: HCPCS

## 2024-05-07 PROCEDURE — 3078F DIAST BP <80 MM HG: CPT

## 2024-05-07 PROCEDURE — G8417 CALC BMI ABV UP PARAM F/U: HCPCS

## 2024-05-07 PROCEDURE — 93000 ELECTROCARDIOGRAM COMPLETE: CPT

## 2024-05-07 PROCEDURE — 3017F COLORECTAL CA SCREEN DOC REV: CPT

## 2024-05-07 PROCEDURE — 1124F ACP DISCUSS-NO DSCNMKR DOCD: CPT

## 2024-05-07 PROCEDURE — 3074F SYST BP LT 130 MM HG: CPT

## 2024-05-07 PROCEDURE — 99215 OFFICE O/P EST HI 40 MIN: CPT

## 2024-05-07 PROCEDURE — 1111F DSCHRG MED/CURRENT MED MERGE: CPT

## 2024-05-07 PROCEDURE — 1036F TOBACCO NON-USER: CPT

## 2024-05-07 NOTE — PROGRESS NOTES
[facility]   Electrophysiology  Office Visit  Date: 5/7/2024    Chief Complaint   Patient presents with    Atrial Fibrillation    Atrial Flutter    Hypertension    Coronary Artery Disease    Cardiomyopathy       Primary cardiologist: Dr. Sorto    Cardiac HX: Perfecto Enciso is a 64 y.o. man with h/o HTN, HLD, CAD, AS, p/w SOB found to have AF (4/2022), s/p SENA/DCCV to NSR (4/8/2022, Dr. Sorto), s/p LHC (4/2022) showed mild to mod CAD, Echo (4/2022) EF 40-45%, s/p AVR w/ CABG and TRAVIS clip (5/2022, Dr. Hammond),  Echo (10/2022) EF 55%, recurrence AF/AFL RVR (11/2023), metoprolol increased, Echo (1/2024) EF 50%, LAE, grade II DD, s/p RFA/PVI of AF/tAFL/PACs (4/30/2024, Dr. Crocker).    Interval History/HPI: Patient is here for follow-up for atrial fibrillation, typical atrial flutter, PACs, HTN, and CMP.  Patient originally diagnosed with atrial fibrillation in April 2022 when he presented with shortness of breath and was found to be in AF.  Patient underwent SENA/DCCV to NSR on April 8, 2022 with Dr. Sorto.  SENA showed moderate to severe aortic stenosis.  Patient then underwent left heart cath in April 2022 with Dr. Sebastian that showed mild to moderate CAD.  Echo at that time showed an EF of 40 to 45%.  Patient underwent AVR, CABG and TRAVIS clip in May 2022 with Dr. Hammond.  Echo done in October 2022 showed EF at 55%.  Patient with recurrence of AF/AFL RVR in November 2023.  At that time his metoprolol was increased.  Echo done in January 2024 showed an EF of 50%, left atrial enlargement, grade 2 diastolic dysfunction.  Patient underwent RFA/PVI of AF/tAFL/PACs (4/30/2024, Dr. Crocker).  Today he presents in NSR, with a heart rate of 90. He has not felt any breakthrough of his atrial fibrillation.  He states that he feels much better since the ablation.  Prior to the ablation when being in AF he had shortness of breath with a flight of stairs and now he states that he is able to climb the stairs in his house

## 2024-06-25 NOTE — PROGRESS NOTES
[facility]   Cardiac Electrophysiology Consultation   Date: 7/8/2024  Reason for Consultation:   Consult Requesting Physician: No att. providers found     Chief Complaint:   Chief Complaint   Patient presents with    Follow-up     3 Month follow up       HPI: Perfecto Enciso is a 64 y.o. male with PMH signficant for HTN, HLD, CAD, AS, s/p AVR with CABG and TRAVIS clip (5/2022), AF (dx'ed 4/2022) after p/w SOB, s/p SENA/DCCV (4/8/22), s/p LHC showed mild to moderate CAD, EF 40-45% (per echo, 4/2022), EF improved to 55 (10/2022), recurrence of AF/AFL RVR (11/2023), metoprolol increased and f/u OV with NP reported HR in the 50's, but no ECG was done. ECG (2/7/24) showed AF/AFL CVR. Echo (1/2024) showed EF 50% with LAE and grade II DD. S/p RFA/PVI of AF/tAFL/focal PACs (4/30/24, myself).    Interval History:   Today, he is here for f/u post ablation, presenting in SR. He feels better since the ablation. He is just now getting back to walking. Denies complaints of palpitations, dizziness, CP, SOB, orthopnea, BLE swelling, or syncope.      Assessment:  Persistent AF, on Eliquis, ASA, Lopressor  S/p AF ablation (4/2024)  S/p TRAVIS clip  CMP / combined systolic and diastolic HF, likely arrhythmia induced, EF 50%, on Lasix, Lopressor  CAD, s/p CABG, on ASA, BB, statin, follows Dr. Sorto  AS, s/p AVR  HTN, stable on lisinopril, Lopressor  HLD, on statin  LAE    Plan:  Stop Eliquis (have Watchman implant)  Continue ASA, Lopressor  Optimize risk factors for AF including treatment of DAYNA, BP control, weight loss, healthy diet, routine exercise, and stress management.  Follow up in 6 months with EP NP (goal of 20 lb weight loss in 6 months)    Atrial Fibrillation:    BMI    :   Body mass index is 40.19 kg/m².    Duration   :   4/2022    Symptoms   :   GRANT, easy fatigability, decline in functional capacity    Previous DCCV :   4/8/22    Previous AAD             :   none    Beta blocker  :   Lopressor    ACE / ARB  :

## 2024-07-08 ENCOUNTER — OFFICE VISIT (OUTPATIENT)
Dept: CARDIOLOGY CLINIC | Age: 64
End: 2024-07-08
Payer: MEDICARE

## 2024-07-08 VITALS
WEIGHT: 313 LBS | SYSTOLIC BLOOD PRESSURE: 118 MMHG | BODY MASS INDEX: 40.19 KG/M2 | HEART RATE: 86 BPM | DIASTOLIC BLOOD PRESSURE: 62 MMHG

## 2024-07-08 DIAGNOSIS — I25.10 CORONARY ARTERY DISEASE INVOLVING NATIVE CORONARY ARTERY OF NATIVE HEART WITHOUT ANGINA PECTORIS: ICD-10-CM

## 2024-07-08 DIAGNOSIS — Z98.890 HISTORY OF LEFT ATRIAL APPENDAGE CLOSURE: ICD-10-CM

## 2024-07-08 DIAGNOSIS — I50.32 CHRONIC DIASTOLIC CHF (CONGESTIVE HEART FAILURE) (HCC): ICD-10-CM

## 2024-07-08 DIAGNOSIS — I10 ESSENTIAL HYPERTENSION: ICD-10-CM

## 2024-07-08 DIAGNOSIS — I50.20 HFREF (HEART FAILURE WITH REDUCED EJECTION FRACTION) (HCC): ICD-10-CM

## 2024-07-08 DIAGNOSIS — I35.0 AORTIC STENOSIS WITH TRILEAFLET VALVE: ICD-10-CM

## 2024-07-08 DIAGNOSIS — E78.2 MIXED HYPERLIPIDEMIA: ICD-10-CM

## 2024-07-08 DIAGNOSIS — I49.1 PAC (PREMATURE ATRIAL CONTRACTION): ICD-10-CM

## 2024-07-08 DIAGNOSIS — I48.3 TYPICAL ATRIAL FLUTTER (HCC): ICD-10-CM

## 2024-07-08 DIAGNOSIS — I48.19 PERSISTENT ATRIAL FIBRILLATION (HCC): Primary | ICD-10-CM

## 2024-07-08 PROCEDURE — 3074F SYST BP LT 130 MM HG: CPT | Performed by: INTERNAL MEDICINE

## 2024-07-08 PROCEDURE — 93000 ELECTROCARDIOGRAM COMPLETE: CPT | Performed by: INTERNAL MEDICINE

## 2024-07-08 PROCEDURE — 1124F ACP DISCUSS-NO DSCNMKR DOCD: CPT | Performed by: INTERNAL MEDICINE

## 2024-07-08 PROCEDURE — 3017F COLORECTAL CA SCREEN DOC REV: CPT | Performed by: INTERNAL MEDICINE

## 2024-07-08 PROCEDURE — G8427 DOCREV CUR MEDS BY ELIG CLIN: HCPCS | Performed by: INTERNAL MEDICINE

## 2024-07-08 PROCEDURE — 99214 OFFICE O/P EST MOD 30 MIN: CPT | Performed by: INTERNAL MEDICINE

## 2024-07-08 PROCEDURE — 3078F DIAST BP <80 MM HG: CPT | Performed by: INTERNAL MEDICINE

## 2024-07-08 PROCEDURE — 1036F TOBACCO NON-USER: CPT | Performed by: INTERNAL MEDICINE

## 2024-07-08 PROCEDURE — G8417 CALC BMI ABV UP PARAM F/U: HCPCS | Performed by: INTERNAL MEDICINE

## 2024-12-17 NOTE — PROGRESS NOTES
inadvertent computerized transcription errors may be present.     Patient received education regarding their diagnosis, treatment and medications while in the office today.      Freddy De León [facility]    I  have spent 45 minutes in care of the patient including direct face to face time, chart preparation, reviewing diagnostic testing, other provider notes and coordinating patient care.

## 2025-01-09 ENCOUNTER — OFFICE VISIT (OUTPATIENT)
Dept: CARDIOLOGY CLINIC | Age: 65
End: 2025-01-09
Payer: MEDICARE

## 2025-01-09 ENCOUNTER — TELEPHONE (OUTPATIENT)
Dept: CARDIOLOGY CLINIC | Age: 65
End: 2025-01-09

## 2025-01-09 VITALS
SYSTOLIC BLOOD PRESSURE: 122 MMHG | WEIGHT: 315 LBS | DIASTOLIC BLOOD PRESSURE: 70 MMHG | HEART RATE: 84 BPM | BODY MASS INDEX: 42.34 KG/M2

## 2025-01-09 DIAGNOSIS — I48.0 PAROXYSMAL ATRIAL FIBRILLATION (HCC): Primary | ICD-10-CM

## 2025-01-09 DIAGNOSIS — I42.8 NICM (NONISCHEMIC CARDIOMYOPATHY) (HCC): ICD-10-CM

## 2025-01-09 DIAGNOSIS — I48.3 TYPICAL ATRIAL FLUTTER (HCC): ICD-10-CM

## 2025-01-09 DIAGNOSIS — I48.4 ATYPICAL ATRIAL FLUTTER (HCC): ICD-10-CM

## 2025-01-09 DIAGNOSIS — R06.02 SHORTNESS OF BREATH: ICD-10-CM

## 2025-01-09 DIAGNOSIS — I50.22 CHRONIC SYSTOLIC CONGESTIVE HEART FAILURE (HCC): ICD-10-CM

## 2025-01-09 DIAGNOSIS — I48.0 PAROXYSMAL ATRIAL FIBRILLATION (HCC): ICD-10-CM

## 2025-01-09 LAB
ANION GAP SERPL CALCULATED.3IONS-SCNC: 13 MMOL/L (ref 3–16)
BUN SERPL-MCNC: 22 MG/DL (ref 7–20)
CALCIUM SERPL-MCNC: 9.7 MG/DL (ref 8.3–10.6)
CHLORIDE SERPL-SCNC: 100 MMOL/L (ref 99–110)
CO2 SERPL-SCNC: 23 MMOL/L (ref 21–32)
CREAT SERPL-MCNC: 1 MG/DL (ref 0.8–1.3)
DEPRECATED RDW RBC AUTO: 15.7 % (ref 12.4–15.4)
GFR SERPLBLD CREATININE-BSD FMLA CKD-EPI: 83 ML/MIN/{1.73_M2}
GLUCOSE SERPL-MCNC: 106 MG/DL (ref 70–99)
HCT VFR BLD AUTO: 43.9 % (ref 40.5–52.5)
HGB BLD-MCNC: 14.5 G/DL (ref 13.5–17.5)
MAGNESIUM SERPL-MCNC: 2.28 MG/DL (ref 1.8–2.4)
MCH RBC QN AUTO: 29 PG (ref 26–34)
MCHC RBC AUTO-ENTMCNC: 33.1 G/DL (ref 31–36)
MCV RBC AUTO: 87.6 FL (ref 80–100)
NT-PROBNP SERPL-MCNC: 57 PG/ML (ref 0–124)
PLATELET # BLD AUTO: 282 K/UL (ref 135–450)
PMV BLD AUTO: 9.6 FL (ref 5–10.5)
POTASSIUM SERPL-SCNC: 4.5 MMOL/L (ref 3.5–5.1)
RBC # BLD AUTO: 5.01 M/UL (ref 4.2–5.9)
SODIUM SERPL-SCNC: 136 MMOL/L (ref 136–145)
WBC # BLD AUTO: 9.3 K/UL (ref 4–11)

## 2025-01-09 PROCEDURE — 3074F SYST BP LT 130 MM HG: CPT | Performed by: NURSE PRACTITIONER

## 2025-01-09 PROCEDURE — 3017F COLORECTAL CA SCREEN DOC REV: CPT | Performed by: NURSE PRACTITIONER

## 2025-01-09 PROCEDURE — 99215 OFFICE O/P EST HI 40 MIN: CPT | Performed by: NURSE PRACTITIONER

## 2025-01-09 PROCEDURE — 1036F TOBACCO NON-USER: CPT | Performed by: NURSE PRACTITIONER

## 2025-01-09 PROCEDURE — 1124F ACP DISCUSS-NO DSCNMKR DOCD: CPT | Performed by: NURSE PRACTITIONER

## 2025-01-09 PROCEDURE — G8427 DOCREV CUR MEDS BY ELIG CLIN: HCPCS | Performed by: NURSE PRACTITIONER

## 2025-01-09 PROCEDURE — 93000 ELECTROCARDIOGRAM COMPLETE: CPT | Performed by: NURSE PRACTITIONER

## 2025-01-09 PROCEDURE — G8417 CALC BMI ABV UP PARAM F/U: HCPCS | Performed by: NURSE PRACTITIONER

## 2025-01-09 PROCEDURE — 3078F DIAST BP <80 MM HG: CPT | Performed by: NURSE PRACTITIONER

## 2025-01-09 RX ORDER — FUROSEMIDE 40 MG/1
40 TABLET ORAL DAILY PRN
Qty: 90 TABLET | Refills: 3 | Status: SHIPPED | OUTPATIENT
Start: 2025-01-09

## 2025-01-14 ENCOUNTER — NURSE ONLY (OUTPATIENT)
Dept: CARDIOLOGY CLINIC | Age: 65
End: 2025-01-14

## 2025-01-29 ENCOUNTER — HOSPITAL ENCOUNTER (OUTPATIENT)
Age: 65
Discharge: HOME | End: 2025-01-31
Payer: MEDICARE

## 2025-01-29 VITALS
SYSTOLIC BLOOD PRESSURE: 122 MMHG | HEIGHT: 70 IN | DIASTOLIC BLOOD PRESSURE: 70 MMHG | BODY MASS INDEX: 45.1 KG/M2 | WEIGHT: 315 LBS

## 2025-01-29 DIAGNOSIS — I50.22 CHRONIC SYSTOLIC CONGESTIVE HEART FAILURE (HCC): ICD-10-CM

## 2025-01-29 DIAGNOSIS — R06.02 SHORTNESS OF BREATH: ICD-10-CM

## 2025-01-29 LAB
ECHO AO ARCH DIAM: 3.4 CM
ECHO AO ROOT DIAM: 3.6 CM
ECHO AO ROOT INDEX: 1.4 CM/M2
ECHO AV AREA PEAK VELOCITY: 1.3 CM2
ECHO AV AREA VTI: 1.3 CM2
ECHO AV AREA/BSA PEAK VELOCITY: 0.5 CM2/M2
ECHO AV AREA/BSA VTI: 0.5 CM2/M2
ECHO AV MEAN GRADIENT: 17 MMHG
ECHO AV MEAN VELOCITY: 1.9 M/S
ECHO AV PEAK GRADIENT: 31 MMHG
ECHO AV PEAK VELOCITY: 2.8 M/S
ECHO AV VELOCITY RATIO: 0.36
ECHO AV VTI: 52 CM
ECHO BSA: 2.71 M2
ECHO LA AREA 2C: 28.3 CM2
ECHO LA AREA 4C: 27.9 CM2
ECHO LA DIAMETER INDEX: 1.36 CM/M2
ECHO LA DIAMETER: 3.5 CM
ECHO LA MAJOR AXIS: 6.6 CM
ECHO LA MINOR AXIS: 6.3 CM
ECHO LA TO AORTIC ROOT RATIO: 0.97
ECHO LA VOL BP: 101 ML (ref 18–58)
ECHO LA VOL MOD A2C: 102 ML (ref 18–58)
ECHO LA VOL MOD A4C: 96 ML (ref 18–58)
ECHO LA VOL/BSA BIPLANE: 39 ML/M2 (ref 16–34)
ECHO LA VOLUME INDEX MOD A2C: 40 ML/M2 (ref 16–34)
ECHO LA VOLUME INDEX MOD A4C: 37 ML/M2 (ref 16–34)
ECHO LV E' LATERAL VELOCITY: 11.6 CM/S
ECHO LV E' SEPTAL VELOCITY: 6.42 CM/S
ECHO LV EDV A2C: 142 ML
ECHO LV EDV A4C: 157 ML
ECHO LV EDV INDEX A4C: 61 ML/M2
ECHO LV EDV NDEX A2C: 55 ML/M2
ECHO LV EJECTION FRACTION A2C: 57 %
ECHO LV EJECTION FRACTION A4C: 56 %
ECHO LV EJECTION FRACTION BIPLANE: 55 % (ref 55–100)
ECHO LV ESV A2C: 61 ML
ECHO LV ESV A4C: 69 ML
ECHO LV ESV INDEX A2C: 24 ML/M2
ECHO LV ESV INDEX A4C: 27 ML/M2
ECHO LV FRACTIONAL SHORTENING: 32 % (ref 28–44)
ECHO LV INTERNAL DIMENSION DIASTOLE INDEX: 2.21 CM/M2
ECHO LV INTERNAL DIMENSION DIASTOLIC: 5.7 CM (ref 4.2–5.9)
ECHO LV INTERNAL DIMENSION SYSTOLIC INDEX: 1.51 CM/M2
ECHO LV INTERNAL DIMENSION SYSTOLIC: 3.9 CM
ECHO LV IVSD: 1 CM (ref 0.6–1)
ECHO LV MASS 2D: 226.4 G (ref 88–224)
ECHO LV MASS INDEX 2D: 87.7 G/M2 (ref 49–115)
ECHO LV POSTERIOR WALL DIASTOLIC: 1 CM (ref 0.6–1)
ECHO LV RELATIVE WALL THICKNESS RATIO: 0.35
ECHO LVOT AREA: 3.5 CM2
ECHO LVOT AV VTI INDEX: 0.38
ECHO LVOT DIAM: 2.1 CM
ECHO LVOT MEAN GRADIENT: 2 MMHG
ECHO LVOT PEAK GRADIENT: 4 MMHG
ECHO LVOT PEAK VELOCITY: 1 M/S
ECHO LVOT STROKE VOLUME INDEX: 26.7 ML/M2
ECHO LVOT SV: 68.9 ML
ECHO LVOT VTI: 19.9 CM
ECHO MV A VELOCITY: 0.53 M/S
ECHO MV E VELOCITY: 0.72 M/S
ECHO MV E/A RATIO: 1.36
ECHO MV E/E' LATERAL: 6.21
ECHO MV E/E' RATIO (AVERAGED): 8.71
ECHO MV E/E' SEPTAL: 11.21
ECHO PV MAX VELOCITY: 0.9 M/S
ECHO PV MEAN GRADIENT: 2 MMHG
ECHO PV MEAN VELOCITY: 0.6 M/S
ECHO PV PEAK GRADIENT: 4 MMHG
ECHO PV VTI: 17 CM
ECHO RA AREA 4C: 16 CM2
ECHO RA END SYSTOLIC VOLUME APICAL 4 CHAMBER INDEX BSA: 16 ML/M2
ECHO RA VOLUME: 42 ML
ECHO RV BASAL DIMENSION: 3.5 CM
ECHO RV FREE WALL PEAK S': 10.6 CM/S
ECHO RV LONGITUDINAL DIMENSION: 7.5 CM
ECHO RV MID DIMENSION: 2.9 CM
ECHO RV TAPSE: 1.9 CM (ref 1.7–?)

## 2025-01-29 PROCEDURE — 93356 MYOCRD STRAIN IMG SPCKL TRCK: CPT

## 2025-02-05 ENCOUNTER — OFFICE VISIT (OUTPATIENT)
Dept: CARDIOLOGY CLINIC | Age: 65
End: 2025-02-05

## 2025-02-05 VITALS
BODY MASS INDEX: 47.26 KG/M2 | SYSTOLIC BLOOD PRESSURE: 130 MMHG | OXYGEN SATURATION: 97 % | HEART RATE: 82 BPM | DIASTOLIC BLOOD PRESSURE: 72 MMHG | WEIGHT: 315 LBS

## 2025-02-05 DIAGNOSIS — I25.10 CORONARY ARTERY DISEASE INVOLVING NATIVE CORONARY ARTERY OF NATIVE HEART WITHOUT ANGINA PECTORIS: ICD-10-CM

## 2025-02-05 DIAGNOSIS — I35.0 NONRHEUMATIC AORTIC VALVE STENOSIS: ICD-10-CM

## 2025-02-05 DIAGNOSIS — E78.2 MIXED HYPERLIPIDEMIA: ICD-10-CM

## 2025-02-05 DIAGNOSIS — I50.32 CHRONIC HEART FAILURE WITH PRESERVED EJECTION FRACTION (HCC): ICD-10-CM

## 2025-02-05 DIAGNOSIS — I10 ESSENTIAL HYPERTENSION: Primary | ICD-10-CM

## 2025-02-05 RX ORDER — METOPROLOL TARTRATE 50 MG
50 TABLET ORAL 2 TIMES DAILY
Qty: 180 TABLET | Refills: 3 | Status: SHIPPED | OUTPATIENT
Start: 2025-02-05

## 2025-02-05 RX ORDER — ATORVASTATIN CALCIUM 40 MG/1
40 TABLET, FILM COATED ORAL NIGHTLY
Qty: 90 TABLET | Refills: 3 | Status: SHIPPED | OUTPATIENT
Start: 2025-02-05

## 2025-02-05 RX ORDER — FUROSEMIDE 40 MG/1
40 TABLET ORAL DAILY PRN
Qty: 90 TABLET | Refills: 3 | Status: SHIPPED | OUTPATIENT
Start: 2025-02-05

## 2025-02-05 ASSESSMENT — ENCOUNTER SYMPTOMS
BLOOD IN STOOL: 0
VOMITING: 0
BACK PAIN: 0
FACIAL SWELLING: 0
EYE DISCHARGE: 0
COUGH: 0
COLOR CHANGE: 0
WHEEZING: 0
ABDOMINAL PAIN: 0
SHORTNESS OF BREATH: 0
CHEST TIGHTNESS: 0
ABDOMINAL DISTENTION: 0

## 2025-02-05 NOTE — PROGRESS NOTES
(LIPITOR) 40 MG tablet Take 1 tablet by mouth nightly 6/6/22  Yes Obrichard, Pita, APRN - CNP   pantoprazole (PROTONIX) 40 MG tablet Take 1 tablet by mouth every morning (before breakfast) 8/13/21  Yes Provider, Carmela, MD        Review of Systems   Constitutional:  Negative for activity change, appetite change, diaphoresis, fatigue, fever and unexpected weight change.   HENT:  Negative for congestion, facial swelling, mouth sores and nosebleeds.    Eyes:  Negative for discharge and visual disturbance.   Respiratory:  Negative for cough, chest tightness, shortness of breath and wheezing.    Cardiovascular:  Negative for chest pain, palpitations and leg swelling.   Gastrointestinal:  Negative for abdominal distention, abdominal pain, blood in stool and vomiting.   Endocrine: Negative for cold intolerance, heat intolerance and polyuria.   Genitourinary:  Negative for difficulty urinating, dysuria, frequency and hematuria.   Musculoskeletal:  Negative for back pain, joint swelling, myalgias and neck pain.   Skin:  Negative for color change, pallor and rash.   Allergic/Immunologic: Negative for immunocompromised state.   Neurological:  Negative for dizziness, syncope, weakness, light-headedness, numbness and headaches.   Hematological:  Negative for adenopathy. Does not bruise/bleed easily.   Psychiatric/Behavioral:  Negative for behavioral problems, confusion, decreased concentration and suicidal ideas. The patient is not nervous/anxious.        Vitals:    02/05/25 0923   BP: 130/72   Pulse: 82   SpO2: 97%      Weight - Scale: (!) 149.4 kg (329 lb 6.4 oz)       Vitals:    02/05/25 0923   BP: 130/72   Site: Right Upper Arm   Position: Sitting   Cuff Size: Large Adult   Pulse: 82   SpO2: 97%   Weight: (!) 149.4 kg (329 lb 6.4 oz)         BP Readings from Last 3 Encounters:   02/05/25 130/72   01/29/25 122/70   01/09/25 122/70       Wt Readings from Last 3 Encounters:   02/05/25 (!) 149.4 kg (329 lb 6.4 oz)   01/29/25

## 2025-02-07 NOTE — TELEPHONE ENCOUNTER
Pt wife called stating that his Jardiance was center to [facility]  but they do not except outside medication. She would like it sent to [facility] in [place] instead.

## 2025-02-10 ENCOUNTER — TELEPHONE (OUTPATIENT)
Dept: CARDIOLOGY CLINIC | Age: 65
End: 2025-02-10

## 2025-02-10 NOTE — TELEPHONE ENCOUNTER
Prescription for Jardiance was sent to [facility] and pt would like it to be sent to [facility].     Medication  empagliflozin (JARDIANCE) 10 MG tablet [[id number]]  empagliflozin (JARDIANCE) 10 MG tablet [[id number]]    Order Details  Dose: 10 mg Route: Oral Frequency: DAILY   Dispense Quantity: 90 tablet Refills: 3          Sig: Take 1 tablet by mouth daily       Pharmacy  [id number] - [place] [id number]MOND [phone] - F [phone] [[id number]]

## 2025-08-26 ENCOUNTER — TELEPHONE (OUTPATIENT)
Dept: CARDIOLOGY CLINIC | Age: 65
End: 2025-08-26

## 2025-09-03 ENCOUNTER — OFFICE VISIT (OUTPATIENT)
Dept: CARDIOLOGY CLINIC | Age: 65
End: 2025-09-03
Payer: MEDICARE

## 2025-09-03 VITALS
HEART RATE: 64 BPM | BODY MASS INDEX: 44.85 KG/M2 | SYSTOLIC BLOOD PRESSURE: 110 MMHG | DIASTOLIC BLOOD PRESSURE: 64 MMHG | WEIGHT: 312.6 LBS

## 2025-09-03 DIAGNOSIS — Z95.2 S/P AVR (AORTIC VALVE REPLACEMENT): ICD-10-CM

## 2025-09-03 DIAGNOSIS — I48.0 PAROXYSMAL ATRIAL FIBRILLATION (HCC): ICD-10-CM

## 2025-09-03 DIAGNOSIS — I50.32 CHRONIC HEART FAILURE WITH PRESERVED EJECTION FRACTION (HCC): ICD-10-CM

## 2025-09-03 DIAGNOSIS — I35.0 NONRHEUMATIC AORTIC VALVE STENOSIS: Primary | ICD-10-CM

## 2025-09-03 DIAGNOSIS — I25.10 CORONARY ARTERY DISEASE INVOLVING NATIVE CORONARY ARTERY OF NATIVE HEART WITHOUT ANGINA PECTORIS: ICD-10-CM

## 2025-09-03 DIAGNOSIS — I10 ESSENTIAL HYPERTENSION: ICD-10-CM

## 2025-09-03 DIAGNOSIS — E78.2 MIXED HYPERLIPIDEMIA: ICD-10-CM

## 2025-09-03 PROCEDURE — G2211 COMPLEX E/M VISIT ADD ON: HCPCS | Performed by: INTERNAL MEDICINE

## 2025-09-03 PROCEDURE — 1036F TOBACCO NON-USER: CPT | Performed by: INTERNAL MEDICINE

## 2025-09-03 PROCEDURE — 1124F ACP DISCUSS-NO DSCNMKR DOCD: CPT | Performed by: INTERNAL MEDICINE

## 2025-09-03 PROCEDURE — G8417 CALC BMI ABV UP PARAM F/U: HCPCS | Performed by: INTERNAL MEDICINE

## 2025-09-03 PROCEDURE — 3078F DIAST BP <80 MM HG: CPT | Performed by: INTERNAL MEDICINE

## 2025-09-03 PROCEDURE — 3074F SYST BP LT 130 MM HG: CPT | Performed by: INTERNAL MEDICINE

## 2025-09-03 PROCEDURE — 99214 OFFICE O/P EST MOD 30 MIN: CPT | Performed by: INTERNAL MEDICINE

## 2025-09-03 PROCEDURE — 3017F COLORECTAL CA SCREEN DOC REV: CPT | Performed by: INTERNAL MEDICINE

## 2025-09-03 PROCEDURE — 1159F MED LIST DOCD IN RCRD: CPT | Performed by: INTERNAL MEDICINE

## 2025-09-03 PROCEDURE — G8427 DOCREV CUR MEDS BY ELIG CLIN: HCPCS | Performed by: INTERNAL MEDICINE

## 2025-09-03 RX ORDER — ATORVASTATIN CALCIUM 40 MG/1
40 TABLET, FILM COATED ORAL NIGHTLY
Qty: 90 TABLET | Refills: 3 | Status: SHIPPED | OUTPATIENT
Start: 2025-09-03

## 2025-09-03 RX ORDER — METOPROLOL TARTRATE 50 MG
50 TABLET ORAL 2 TIMES DAILY
Qty: 180 TABLET | Refills: 3 | Status: SHIPPED | OUTPATIENT
Start: 2025-09-03

## (undated) DEVICE — CONNECTOR PERF 3/8X3/8X3/8IN EQL WYE

## (undated) DEVICE — SUTURE PERMA-HAND SZ 4-0 L144IN NONABSORBABLE BLK LIGAPAK LA53G

## (undated) DEVICE — SWAB CULTURET AMIES DBL

## (undated) DEVICE — SYRINGE MED 30ML STD CLR PLAS LUERLOCK TIP N CTRL DISP

## (undated) DEVICE — CANNULA PERF 14FR L6IN COR ART OSTIAL BSKT TIP DLP

## (undated) DEVICE — SUTURE NONABSORBABLE MONOFILAMENT 4-0 RB-1 36 IN BLU PROLENE 8557H

## (undated) DEVICE — SUTURE PERMAHAND SZ 2-0 L12X18IN NONABSORBABLE BLK SILK A185H

## (undated) DEVICE — NEURO FUSION ADD-ON PACK: Brand: MEDLINE INDUSTRIES, INC.

## (undated) DEVICE — OPEN HRT CDS

## (undated) DEVICE — SOLUTION IV CARDPLG PLEGISOL

## (undated) DEVICE — FOGARTY - HYDRAGRIP SURGICAL - CLAMP INSERTS: Brand: FOGARTY SOFTJAW

## (undated) DEVICE — SOLUTION IRRIG 1000ML 0.9% SOD CHL USP POUR PLAS BTL

## (undated) DEVICE — SUTURE PERMA-HAND 0 L18IN NONABSORBABLE BLK SILK BRAID W/O SA66G

## (undated) DEVICE — INTENT TO BE USED WITH SUTURE MATERIAL FOR TISSUE CLOSURE: Brand: RICHARD-ALLAN® NEEDLE 1/2 CIRCLE TAPER

## (undated) DEVICE — SOLUTION IV 1000ML 140MEQ/L SOD 5MEQ/L K 3MEQ/L MG 27MEQ/L

## (undated) DEVICE — CLIP INT SM WIDE RED TI TRNSVRS GRV CHEVRON SHP W PRECIS

## (undated) DEVICE — COUNTER NDL 40 COUNT HLD 70 NUM FOAM BLK SGL MAG W BLDE REMV

## (undated) DEVICE — STANDARD HYPODERMIC NEEDLE,POLYPROPYLENE HUB: Brand: MONOJECT

## (undated) DEVICE — SUTURE VCRL SZ 0 L18IN ABSRB UD L36MM CT-1 1/2 CIR J840D

## (undated) DEVICE — COR-KNOT MINI® DEVICE KIT: Brand: COR-KNOT MINI®

## (undated) DEVICE — SYRINGE MED 10ML LUERLOCK TIP W/O SFTY DISP

## (undated) DEVICE — BAG PRSS INFUS 500ML NYL NETTED BK VISIBLE COLOR-CODED G L

## (undated) DEVICE — SUTURE VCRL SZ 3-0 L27IN ABSRB UD L24MM FS-1 3/8 CIR REV J442H

## (undated) DEVICE — CANNULA PERF 7FR L5.5IN AORT ROOT RADPQ STD TIP W/ VENT LN

## (undated) DEVICE — PROVE COVER: Brand: UNBRANDED

## (undated) DEVICE — SURE SET-DOUBLE BASIN-LF: Brand: MEDLINE INDUSTRIES, INC.

## (undated) DEVICE — ROYALSILK SURGICAL GOWN, L: Brand: CONVERTORS

## (undated) DEVICE — SHEET, T, LAPAROTOMY, STERILE: Brand: MEDLINE

## (undated) DEVICE — LOOP,VESSEL,MAXI,BLUE,2/PK,STERILE: Brand: MEDLINE

## (undated) DEVICE — ERBE NESSY®PLATE 170 SPLIT; 168CM²: Brand: ERBE

## (undated) DEVICE — KIT COMPL CK0289R4  BB9L99R8

## (undated) DEVICE — CANNULA PERF 24FR 51CML 45DEG TIP 3 8IN CONN 20CML SUT RNG

## (undated) DEVICE — SOLUTION NORMOSOL-R PH 7.4   X

## (undated) DEVICE — CANNULA VES L25IN RADPQ BODY W  1 W VLV 3MM BLNT TIP DLP

## (undated) DEVICE — RESERVOIR,SUCTION,100CC,SILICONE: Brand: MEDLINE

## (undated) DEVICE — APPLICATOR PREP 26ML 0.7% IOD POVACRYLEX 74% ISO ALC ST

## (undated) DEVICE — SYSTEM SKIN CLSR 22CM DERMBND PRINEO

## (undated) DEVICE — COR-KNOT® QUICK LOAD® 6-POUCH: Brand: COR-KNOT® QUICK LOAD®

## (undated) DEVICE — SUTURE GOR TX SZ 2-0 L36IN NONABSORBABLE L18MM TH-18 1/2 3N04B

## (undated) DEVICE — COVER LT HNDL CAM BLU DISP W/ SURG CTRL

## (undated) DEVICE — CONNECTOR STR 3/8IN ST 050506000 375STRCONN

## (undated) DEVICE — SUTURE PERMA-HAND SZ 2 L60IN NONABSORBABLE BLK SILK BRAID SA8H

## (undated) DEVICE — NEEDLE FRENCH EYE 2129-04

## (undated) DEVICE — PROC PK CPC OPN HRT CUST

## (undated) DEVICE — Device: Brand: TEMPORARY MYOCARDIAL HEARTWIRE

## (undated) DEVICE — CONNECTOR PERF L5 IN OD1 2 IN SAT HCT ST FEATURING B CARE 5

## (undated) DEVICE — SUTURE MCRYL SZ 3-0 L27IN ABSRB UD L19MM PS-2 3/8 CIR PRIM Y427H

## (undated) DEVICE — SUTURE ABSORBABLE BRAIDED 2-0 CT-1 27 IN UD VICRYL J259H

## (undated) DEVICE — WAX SURG 2.5GM HEMSTAT BNE BEESWAX PARAFFIN ISO PALMITATE

## (undated) DEVICE — VENT CANN 13FR CONN DIA0.25IN L VENT PVC VENT CONN R ANG

## (undated) DEVICE — BLOOD TRANSFUSION FILTER: Brand: HAEMONETICS

## (undated) DEVICE — 6 ML SYRINGE LUER-LOCK TIP: Brand: MONOJECT

## (undated) DEVICE — Z INACTIVE OBSOLETE PER MEDTRONIC ADAPTER Y TYP RECIRCULATING FEM LUER CLMP W  CLR CODE ARROWS

## (undated) DEVICE — RESERVOIR AUTOTRANSFUSION 225/120 CC GS FILTERED XTRA

## (undated) DEVICE — DRAIN SURG W7MMXL20CM SIL FULL PERF HUBLESS FLAT RADPQ STRP

## (undated) DEVICE — FIAPC® PROBE W/ FILTER 2200 A OD 2.3MM/6.9FR; L 2.2M/7.2FT: Brand: ERBE

## (undated) DEVICE — SUTURE PDS II SZ 0 L27IN ABSRB VLT L36MM CT-1 1/2 CIR Z340H

## (undated) DEVICE — STOCKINETTE,DOUBLE PLY,6X48,STERILE: Brand: MEDLINE

## (undated) DEVICE — [HIGH FLOW INSUFFLATOR,  DO NOT USE IF PACKAGE IS DAMAGED,  KEEP DRY,  KEEP AWAY FROM SUNLIGHT,  PROTECT FROM HEAT AND RADIOACTIVE SOURCES.]: Brand: PNEUMOSURE

## (undated) DEVICE — STERNUM SAW BLADE, 9.4MM X 34MM X 1MM: Brand: MICROAIRE®

## (undated) DEVICE — FOGARTY SPRING CLIPS 6MM: Brand: FOGARTY SOFTJAW

## (undated) DEVICE — TUBING, SUCTION, 1/4" X 12', STRAIGHT: Brand: MEDLINE

## (undated) DEVICE — ADAPTER,CATHETER/SYRINGE/LUER,STERILE: Brand: MEDLINE

## (undated) DEVICE — SUTURE VCRL SZ 0 L18IN ABSRB VLT L36MM CT-1 1/2 CIR J740D

## (undated) DEVICE — E-Z CLEAN, NON-STICK, PTFE COATED, ELECTROSURGICAL BLADE ELECTRODE, 4 INCH (10.2 CM): Brand: MEGADYNE

## (undated) DEVICE — ELECTROSURGICAL PENCIL ROCKER SWITCH NON COATED BLADE ELECTRODE 10 FT (3 M) CORD HOLSTER: Brand: MEGADYNE

## (undated) DEVICE — FORCEPS BX L240CM JAW DIA2.8MM L CAP W/ NDL MIC MESH TOOTH

## (undated) DEVICE — SPONGE GZ W4XL4IN COT 12 PLY TYP VII WVN C FLD DSGN

## (undated) DEVICE — SUTURE NONABSORBABLE MONOFILAMENT 5-0 C-1 1X24 IN PROLENE 8725H

## (undated) DEVICE — CANNULA PERF L15IN DIA29FR VEN 3 STG THN WALL DSGN W  VENT

## (undated) DEVICE — NEEDLE HYPO 22GA L1.5IN BLK POLYPR HUB S STL REG BVL STR

## (undated) DEVICE — SPONGE GZ W4XL8IN COT WVN 12 PLY

## (undated) DEVICE — GOWN,SIRUS,POLYRNF,BRTHSLV,XL,30/CS: Brand: MEDLINE

## (undated) DEVICE — SUTURE S STL SZ 6 L18IN NONABSORBABLE SIL L48MM V-40 1/2 M649G

## (undated) DEVICE — E-Z CLEAN, NON-STICK, PTFE COATED, ELECTROSURGICAL BLADE ELECTRODE, 2.5 INCH (6.35 CM): Brand: EZ CLEAN

## (undated) DEVICE — FILTER OXGNTR GAS BACT VIR REMOVAL W/ 1/4 INLET

## (undated) DEVICE — 3M™ TEGADERM™ TRANSPARENT FILM DRESSING FRAME STYLE, 1628, 6 IN X 8 IN (15 CM X 20 CM), 10/CT 8CT/CASE: Brand: 3M™ TEGADERM™

## (undated) DEVICE — BATTERY PACK FOR VARISPEED: Brand: STRYKER VARISPEED

## (undated) DEVICE — SOLUTION IRRIG 3000ML 0.9% SOD CHL USP UROMATIC PLAS CONT

## (undated) DEVICE — CATHETERIZATION TRAY 16 FR 5 CC FOL STR TIP URIMTR BARDX IC

## (undated) DEVICE — 3M™ IOBAN™ 2 ANTIMICROBIAL INCISE DRAPE 6648EZ: Brand: IOBAN™ 2

## (undated) DEVICE — PUNCH AORT DIA4MM LNG HNDL

## (undated) DEVICE — SURGICAL PROCEDURE PACK PERF TBNG

## (undated) DEVICE — Device

## (undated) DEVICE — SUTURE NONABSORBABLE BRAIDED 2-0 RB-1 10X30 IN ETHBND EXCEL 10X42

## (undated) DEVICE — SOLUTION IV SODIUM CHL 0.9% 500 ML INJ FLX CONTAINER

## (undated) DEVICE — SOLUTION IV 1000ML 0.9% SOD CHL

## (undated) DEVICE — DRAPE SURG HRT STRL

## (undated) DEVICE — TOWEL,STOP FLAG GOLD N-W: Brand: MEDLINE

## (undated) DEVICE — SYSTEM VES HARV ENDOSCP VASOVIEW HEMOPRO

## (undated) DEVICE — GENERAL: Brand: MEDLINE INDUSTRIES, INC.

## (undated) DEVICE — CLEANER,CAUTERY TIP,2X2",STERILE: Brand: MEDLINE

## (undated) DEVICE — SUTURE NONABSORBABLE MFIL TAPR PNT 3/8 CIR BLU 8.0M BV175-6 8734H

## (undated) DEVICE — GLOVE SURG SZ 75 L12IN FNGR THK94MIL TRNSLUC YEL LTX